# Patient Record
Sex: FEMALE | Race: WHITE | NOT HISPANIC OR LATINO | Employment: OTHER | URBAN - METROPOLITAN AREA
[De-identification: names, ages, dates, MRNs, and addresses within clinical notes are randomized per-mention and may not be internally consistent; named-entity substitution may affect disease eponyms.]

---

## 2018-03-15 ENCOUNTER — PROCEDURE VISIT CONVERTED (OUTPATIENT)
Dept: PODIATRY | Facility: CLINIC | Age: 83
End: 2018-03-15
Attending: PODIATRIST

## 2018-04-17 ENCOUNTER — CONVERSION ENCOUNTER (OUTPATIENT)
Dept: ORTHOPEDIC SURGERY | Facility: CLINIC | Age: 83
End: 2018-04-17

## 2018-04-17 ENCOUNTER — OFFICE VISIT CONVERTED (OUTPATIENT)
Dept: ORTHOPEDIC SURGERY | Facility: CLINIC | Age: 83
End: 2018-04-17
Attending: ORTHOPAEDIC SURGERY

## 2018-06-05 ENCOUNTER — OFFICE VISIT CONVERTED (OUTPATIENT)
Dept: ORTHOPEDIC SURGERY | Facility: CLINIC | Age: 83
End: 2018-06-05
Attending: PHYSICIAN ASSISTANT

## 2018-06-05 ENCOUNTER — CONVERSION ENCOUNTER (OUTPATIENT)
Dept: ORTHOPEDIC SURGERY | Facility: CLINIC | Age: 83
End: 2018-06-05

## 2018-06-08 ENCOUNTER — PROCEDURE VISIT CONVERTED (OUTPATIENT)
Dept: PODIATRY | Facility: CLINIC | Age: 83
End: 2018-06-08
Attending: PODIATRIST

## 2018-06-08 ENCOUNTER — CONVERSION ENCOUNTER (OUTPATIENT)
Dept: OTHER | Facility: HOSPITAL | Age: 83
End: 2018-06-08

## 2018-07-17 ENCOUNTER — OFFICE VISIT CONVERTED (OUTPATIENT)
Dept: ORTHOPEDIC SURGERY | Facility: CLINIC | Age: 83
End: 2018-07-17
Attending: PHYSICIAN ASSISTANT

## 2018-07-17 ENCOUNTER — CONVERSION ENCOUNTER (OUTPATIENT)
Dept: ORTHOPEDIC SURGERY | Facility: CLINIC | Age: 83
End: 2018-07-17

## 2018-08-31 ENCOUNTER — CONVERSION ENCOUNTER (OUTPATIENT)
Dept: PODIATRY | Facility: CLINIC | Age: 83
End: 2018-08-31

## 2018-08-31 ENCOUNTER — PROCEDURE VISIT CONVERTED (OUTPATIENT)
Dept: PODIATRY | Facility: CLINIC | Age: 83
End: 2018-08-31
Attending: PODIATRIST

## 2018-11-01 ENCOUNTER — OFFICE VISIT CONVERTED (OUTPATIENT)
Dept: ORTHOPEDIC SURGERY | Facility: CLINIC | Age: 83
End: 2018-11-01
Attending: ORTHOPAEDIC SURGERY

## 2018-12-03 ENCOUNTER — PROCEDURE VISIT CONVERTED (OUTPATIENT)
Dept: PODIATRY | Facility: CLINIC | Age: 83
End: 2018-12-03
Attending: PODIATRIST

## 2019-02-04 ENCOUNTER — PROCEDURE VISIT CONVERTED (OUTPATIENT)
Dept: PODIATRY | Facility: CLINIC | Age: 84
End: 2019-02-04
Attending: PODIATRIST

## 2019-04-22 ENCOUNTER — PROCEDURE VISIT CONVERTED (OUTPATIENT)
Dept: PODIATRY | Facility: CLINIC | Age: 84
End: 2019-04-22
Attending: PODIATRIST

## 2019-07-08 ENCOUNTER — PROCEDURE VISIT CONVERTED (OUTPATIENT)
Dept: PODIATRY | Facility: CLINIC | Age: 84
End: 2019-07-08
Attending: PODIATRIST

## 2019-09-30 ENCOUNTER — PROCEDURE VISIT CONVERTED (OUTPATIENT)
Dept: PODIATRY | Facility: CLINIC | Age: 84
End: 2019-09-30
Attending: PODIATRIST

## 2019-11-07 ENCOUNTER — OFFICE VISIT CONVERTED (OUTPATIENT)
Dept: ORTHOPEDIC SURGERY | Facility: CLINIC | Age: 84
End: 2019-11-07
Attending: ORTHOPAEDIC SURGERY

## 2019-12-16 ENCOUNTER — CONVERSION ENCOUNTER (OUTPATIENT)
Dept: PODIATRY | Facility: CLINIC | Age: 84
End: 2019-12-16

## 2019-12-16 ENCOUNTER — PROCEDURE VISIT CONVERTED (OUTPATIENT)
Dept: PODIATRY | Facility: CLINIC | Age: 84
End: 2019-12-16
Attending: PODIATRIST

## 2020-03-09 ENCOUNTER — PROCEDURE VISIT CONVERTED (OUTPATIENT)
Dept: PODIATRY | Facility: CLINIC | Age: 85
End: 2020-03-09
Attending: PODIATRIST

## 2020-06-08 ENCOUNTER — CONVERSION ENCOUNTER (OUTPATIENT)
Dept: PODIATRY | Facility: CLINIC | Age: 85
End: 2020-06-08

## 2020-06-08 ENCOUNTER — PROCEDURE VISIT CONVERTED (OUTPATIENT)
Dept: PODIATRY | Facility: CLINIC | Age: 85
End: 2020-06-08
Attending: PODIATRIST

## 2020-08-31 ENCOUNTER — CONVERSION ENCOUNTER (OUTPATIENT)
Dept: PODIATRY | Facility: CLINIC | Age: 85
End: 2020-08-31

## 2020-08-31 ENCOUNTER — PROCEDURE VISIT CONVERTED (OUTPATIENT)
Dept: PODIATRY | Facility: CLINIC | Age: 85
End: 2020-08-31
Attending: PODIATRIST

## 2020-11-23 ENCOUNTER — PROCEDURE VISIT CONVERTED (OUTPATIENT)
Dept: PODIATRY | Facility: CLINIC | Age: 85
End: 2020-11-23
Attending: PODIATRIST

## 2021-03-02 ENCOUNTER — PROCEDURE VISIT CONVERTED (OUTPATIENT)
Dept: PODIATRY | Facility: CLINIC | Age: 86
End: 2021-03-02
Attending: PODIATRIST

## 2021-03-29 ENCOUNTER — OFFICE VISIT CONVERTED (OUTPATIENT)
Dept: OTOLARYNGOLOGY | Facility: CLINIC | Age: 86
End: 2021-03-29
Attending: NURSE PRACTITIONER

## 2021-05-10 NOTE — H&P
History and Physical      Patient Name: Megha Hardy   Patient ID: [id number]   Sex: Female   YOB: 1930    Primary Care Provider: Vasquez Berry   Referring Provider: Vasquez Berry    Visit Date: March 29, 2021    Provider: MELVIN Chopra   Location: [facility]   Location Address: [address] [place] [address], [address] [place] [address]  [id number]   Location Phone: [phone]          Chief Complaint     1.  Hearing loss    2.  Cerumen impactions    3.  Postnasal drainage and chronic rhinitis       History Of Present Illness  Megha Hardy is a 89 year old /White female who presents to the office today as a consult from Vasquez Berry.      She presents to the clinic today accompanied by her son for evaluation of her ears.  She reports that she has had issues with cerumen impaction over the past several months.  She feels like her hearing has slowly been worsening over several years.  She reports that she has more trouble hearing out of her right ear and has always felt this way.  She states that she does have trouble with the TV and has to turn it up very loudly.  She states that sometimes she is able to hear on the telephone and other times she is not.  She denies any issues with recurrent infection, pain or otorrhea.  She does have some ringing in her ears bilaterally.  She has never had an audiogram.    She also states for many years she has had issues with postnasal drainage and chronic rhinitis.  She states that it is often clear that comes out of her nose but she feels like there is a thick phlegm that gets stuck in the back of her throat.  She will occasionally have some bleeding from her nose.  She is not using any nasal sprays or antihistamines.       Past Medical History  Ankle pain; Arthritis; Bladder disorder; Broken shoulder; Bronchitis; Bunion; Cancer, skin, squamous cell; Cerumen Impaction; Corns and callus; Foot cramps; Foot pain, left; Foot pain, right;  Hallux valgus of left foot; Hallux valgus of right foot; Hammer toe; Head injury; Hearing loss; Heart Disease; High cholesterol; History of total knee arthroplasty, left, 2018; Hyperlipidemia; Hypertension; Ingrowing toenail; Kidney Disease; Kidney problem; Limb Swelling; Nausea; Numbness in feet; Obesity; Primary osteoarthritis of left knee; Shortness of breath; Sinus congestion; Tinea unguium; Ulcer         Past Surgical History  Artificial Joints/Limbs; cardiac stents; Knee replacement, left; Knee replacement, right; shoulder repair; skin cancer removed         Medication List  amlodipine 5 mg oral tablet; atorvastatin 20 mg oral tablet; Calcium 600 + D(3) 600-125 mg-unit oral tablet; Diovan 40 mg oral tablet; metoprolol succinate 50 mg oral tablet extended release 24 hr; multivitamin oral tablet; pantoprazole 20 mg oral tablet,delayed release (DR/EC); PreserVision AREDS-2 952-311-27-1 mg-unit-mg-mg oral capsule; Vitamin D3 125 mcg (5,000 unit) oral tablet         Allergy List  SULFA (SULFONAMIDES)         Family Medical History  Family history of colon cancer; Family history of Arthritis; Family history of stroke; Family history of heart disease; Family history of diabetes mellitus         Reproductive History   0 Para 0 0 0 0       Social History  Alcohol (Never); lives alone; Recreational Drug Use (Never); Retired.; Tobacco (Never);          Immunizations  Name Date Admin   Influenza 10/09/2015         Review of Systems  · Constitutional  o Denies  o : fever, night sweats, weight loss  · Eyes  o Admits  o : impaired vision  o Denies  o : discharge from eye  · HENT  o Admits  o : *See HPI  · Cardiovascular  o Denies  o : chest pain, irregular heart beats  · Respiratory  o Admits  o : shortness of breath  o Denies  o : wheezing, coughing up blood  · Gastrointestinal  o Admits  o : reflux  o Denies  o : heartburn, vomiting blood  · Genitourinary  o Admits  o :  "frequency  · Integument  o Admits  o : rash, skin dryness  · Neurologic  o Admits  o : loss of balance, dizziness  o Denies  o : seizures, loss of consciousness  · Endocrine  o Denies  o : cold intolerance, heat intolerance  · Heme-Lymph  o Admits  o : easy bleeding  o Denies  o : anemia      Vitals  Date Time BP Position Site L\R Cuff Size HR RR TEMP (F) WT  HT  BMI kg/m2 BSA m2 O2 Sat FR L/min FiO2 HC       03/29/2021 01:32 PM        97.3 193lbs 8oz 5'  6\" 31.23 2.02             Physical Examination  · Constitutional  o Appearance  o : well developed, well-nourished, alert and in no acute distress, voice clear and strong  · Head and Face  o Head  o :   § Inspection  § : no deformities or lesions  o Face  o :   § Inspection  § : No facial lesions; House-Brackmann I/VI bilaterally  § Palpation  § : No TMJ crepitus nor  muscle tenderness bilaterally  · Eyes  o Vision  o :   § Visual Fields  § : Extraocular movements are intact. No spontaneous or gaze-induced nystagmus.  o Conjunctivae  o : clear  o Sclerae  o : clear  o Pupils and Irises  o : pupils equal, round, and reactive to light.   · Ears, Nose, Mouth and Throat  o Ears  o :   § External Ears  § : appearance within normal limits, no lesions present  § Otoscopic Examination  § : Bilateral cerumen impaction, cleared under the microscope using alligator forceps and suction, tympanic membrane appearance within normal limits bilaterally without perforations, well-aerated middle ears  § Hearing  § : intact to conversational voice both ears  o Nose  o :   § External Nose  § : appearance normal  § Intranasal Exam  § : mucosa within normal limits, vestibules normal, no intranasal lesions present, septum midline, sinuses non tender to percussion  o Oral Cavity  o :   § Oral Mucosa  § : oral mucosa normal without pallor or cyanosis  § Lips  § : lip appearance normal  § Teeth  § : normal dentition for age  § Gums  § : gums pink, non-swollen, no bleeding " present  § Tongue  § : tongue appearance normal; normal mobility  § Palate  § : hard palate normal, soft palate appearance normal with symmetric mobility  o Throat  o :   § Oropharynx  § : no inflammation or lesions present, tonsils within normal limits  · Neck  o Inspection/Palpation  o : normal appearance, no masses or tenderness, trachea midline; thyroid size normal, nontender, no nodules or masses present on palpation  · Respiratory  o Respiratory Effort  o : breathing unlabored  o Inspection of Chest  o : normal appearance, no retractions  · Lymphatic  o Neck  o : no lymphadenopathy present  o Supraclavicular Nodes  o : no lymphadenopathy present  o Preauricular Nodes  o : no lymphadenopathy present  · Skin and Subcutaneous Tissue  o General Inspection  o : Regarding face and neck - there are no rashes present, no lesions present, and no areas of discoloration  · Neurologic  o Cranial Nerves  o : cranial nerves II-XII are grossly intact bilaterally  o Gait and Station  o : normal gait, able to stand without diffculty  · Psychiatric  o Judgement and Insight  o : judgment and insight intact  o Mood and Affect  o : mood normal, affect appropriate          Assessment  · Impacted cerumen, bilateral     380.4/H61.23  · Chronic rhinitis     472.0/J31.0  · Sensorineural hearing loss (SNHL), bilateral     389.18/H90.3      Plan  · Orders  o Audiometry, pure-tone (threshold); [facility] (22345) - 389.18/H90.3 - 03/29/2021  o Tympanogram (Impedance Testing) [facility] (89097) - 389.18/H90.3 - 03/29/2021  · Medications  o azelastine 137 mcg (0.1 %) nasal aerosol,spray   SIG: apply 2 sprays by nasal route 2 times a day   DISP: (1) Bottle with 11 refills  Prescribed on 03/29/2021     o Medications have been Reconciled  o Transition of Care or Provider Policy  · Instructions  o She presents to the clinic today accompanied by her son for evaluation of her ears. She reports that she has had issues with cerumen impaction over the  past several months. She feels like her hearing has slowly been worsening over several years. She reports that she has more trouble hearing out of her right ear and has always felt this way. She states that she does have trouble with the TV and has to turn it up very loudly. She states that sometimes she is able to hear on the telephone and other times she is not. She denies any issues with recurrent infection, pain or otorrhea. She does have some ringing in her ears bilaterally. She has never had an audiogram.She also states for many years she has had issues with postnasal drainage and chronic rhinitis. She states that it is often clear that comes out of her nose but she feels like there is a thick phlegm that gets stuck in the back of her throat. She will occasionally have some bleeding from her nose. She is not using any nasal sprays or antihistamines. On examination today she has bilateral cerumen impactions which I am able to clear under the microscope using suction and alligator forceps. Bilateral tympanic membrane appearance is normal. Middle ears are well aerated. We did obtain an audiogram and tympanogram. The audiogram shows the left ear with a borderline normal hearing sloping to a severe sensorineural hearing loss. The right ear shows an essentially flat moderate to moderately severe mainly sensorineural hearing loss with a slight mixed component at 500 Hz. Speech reception threshold was at 60 dB on the right and 40 dB on the left. Word discrimination scores were 44% on the right at 95 dB and 84% on the left at 80 dB. Right tympanogram showed negative pressure in the left tympanogram showed slightly stiffened movement but no negative pressure. I have gone over the results of the audiogram with the patient and her son. I do feel like hearing aids would be beneficial to her but at this time she does not want to look into these. I will plan to see her back in 1 year to remove cerumen from her ears again.  For her postnasal drainage and chronic rhinitis I will start her on azelastine nasal spray to use daily.  o Electronically Identified Patient Education Materials Provided Electronically  · Correspondence  o ENT Letter to Referring MD (Vasquez Berry) - 03/29/2021            Electronically Signed by: MELVIN Chopra -Author on March 29, 2021 03:03:39 PM

## 2021-05-13 NOTE — PROGRESS NOTES
Progress Note      Patient Name: Megha Hardy   Patient ID: [id number]   Sex: Female   YOB: 1930    Primary Care Provider: Vasquez Berry   Referring Provider: Aidan Lundberg DPM    Visit Date: June 8, 2020    Provider: Aidan Lundberg DPM   Location: [facility]   Location Address: [address] [place] [address]  [id number]   Location Phone: [phone]          Chief Complaint  · Routine Foot Care Visit      History Of Present Illness  Megha Hardy complains of painful, elongated toenails which are thickened, yellowed, chalky, and cause pain with shoe gear and ambulation.      New, Established, New Problem:  Established   Location:  Toenails  Duration:   Greater than five years  Onset:  Gradual  Nature:  Sore with palpation.  Stable, worsening, improving:   stable  Aggravating factors:  Pain with shoe gear and ambulation.  Previous Treatment:  Debridement    Patient denies any fevers, chills, nausea, vomiting, shortness of breathe, nor any other constitutional signs nor symptoms.    Patient reports that no changes in their medications with their recent appointment with their primary care provider.       Past Medical History  Ankle pain; Arthritis; Bladder Disorder; Broken shoulder; Bronchitis; Bunion; Cancer, skin, squamous cell; Corns and callus; Foot cramps; Foot pain, left; Foot pain, right; Hallux valgus of left foot; Hallux valgus of right foot; Hammer toe; Head injury; Heart Disease; High blood pressure; High cholesterol; History of total knee arthroplasty, left, 05/07/2018; Hyperlipidemia; Hypertension; Ingrowing toenail; Kidney Disease; Kidney problem; Limb Swelling; Nausea; Numbness in feet; Obesity; Primary osteoarthritis of left knee; Shortness of breath; Shortness of breath; Sinus congestion; Tinea unguium; Ulcer         Past Surgical History  Artificial Joints/Limbs; cardiac stents; Knee replacement, left; Knee replacement, right;  "shoulder repair; skin cancer removed         Medication List  amlodipine 5 mg oral tablet; atorvastatin 20 mg oral tablet; Calcium 600 + D(3) 600-125 mg-unit oral tablet; metoprolol succinate 50 mg oral tablet extended release 24 hr; multivitamin oral tablet         Allergy List  SULFA (SULFONAMIDES)       Allergies Reconciled  Family Medical History  Stroke; Heart Disease; Cancer, Unspecified; Diabetes, unspecified type; Colon Cancer; Diabetes; Family history of Arthritis         Reproductive History   0 Para 0 0 0 0       Social History  Alcohol (Never); Alcohol Use (Never); lives alone; Recreational Drug Use (Never); Retired; Retired.; Tobacco (Never);          Immunizations  Name Date Admin   Influenza          Review of Systems  · Constitutional  o Denies  o : fever, chills  · Eyes  o Denies  o : double vision  · HENT  o Denies  o : vertigo, recent head injury, hearing loss or ringing  · Cardiovascular  o Denies  o : chest pain, irregular heart beats  · Respiratory  o Denies  o : shortness of breath, productive cough  · Gastrointestinal  o Denies  o : nausea, vomiting  · Integument  o * See HPI  · Neurologic  o Denies  o : altered mental status, tingling or numbness, seizures  · Musculoskeletal  o Denies  o : joint pain, joint swelling, limitation of motion      Vitals  Date Time BP Position Site L\R Cuff Size HR RR TEMP (F) WT  HT  BMI kg/m2 BSA m2 O2 Sat        2020 10:22 /64 Sitting    58 - R  97.6 190lbs 2oz 5'  7\" 29.78 2.02 95 %          Physical Examination  · Constitutional  o Appearance  o : overweight, well developed, no obvious deformities present, appears to be chronically ill   · Eyes  o Vision  o : Patient wearing glasses.   · Respiratory  o Respiratory Effort  o : No labored breathing. Good respiratory effort.   · Cardiovascular  o Peripheral Vascular System  o :   § Pedal Pulses  § : Pedal Pulses are 2+ and symmetrical  § Extremities  § : 1+ edema present, no cyanosis, " no distal hair loss, normal capillary refill  · Musculoskeletal  o Extremeties/Joint  o : Lower extremity muscle strength and range of motion is equal and symmetrical bilaterally. The knees are noted to be in normal alignment. Ankle alignment and range of motion is normal and foot structure is normal.  · Skin and Subcutaneous Tissue  o General Inspection  o : no lesions present, no areas of discoloration, skin turgor normal, texture normal  · Neurologic  o Sensation  o : Sharp/dull sensation is within normal limits. Heth-Maribell 5.07 monofilament intact to all assessed areas.   · Toes  o Toes: Right Foot  o :   § Inspection  § : Medial deviation of the first metatarsal with associated lateral deviation of the hallux at the metatarsal phalangeal joint.   § Toenails  § : Toenails are hypertrophic, mycotc, dystrophic, thickened, with sublingual debris, incurvated, brittle toenail(s) at nail-1, 2, 3,, Oncholysis of the foot   o Toes: Left Foot  o :   § Inspection  § : Medial deviation of the first metatarsal with associated lateral deviation of the hallux at the metatarsal phalangeal joint.   § Toenails  § : Toenails are hypertrophic, mycotc, dystrophic, thickened, with sublingual debris, incurvated, brittle toenail(s) at nail-1, 2, 3,, Onycholysis of the foot   · Procedures  o Nail Debridement  o : Nail debridement is indicated for the following toenails:-left hallux, left 2nd toe, left 3rd toe, right hallux, right 2nd toe, right 3rd toe,, The nail was debrided of excessive thickness to appropriate levels of comfort and contour using nail nippers. The procedure was without complications          Assessment  · Foot pain, left     729.5/M79.672  · Foot pain, right     729.5/M79.671  · Ingrowing nail     703.0/L60.0  · Tinea unguium     110.1/B35.1  · Hallux valgus of left foot     735.0/M20.12  · Hallux valgus of right foot     735.0/M20.11  · Ingrowing toenail     703.0/L60.0      Plan  · Orders  o Debridement of  six or more nails (17863, 01476, 61659, 90189, 28950, 29345, 13252) - 703.0/L60.0, 729.5/M79.671, 729.5/M79.672, 110.1/B35.1 - 06/08/2020  o ACO-16: BMI above normal range and follow-up plan is documented (, , , , , , ) - - 06/08/2020  · Medications  o Medications have been Reconciled  o Transition of Care or Provider Policy  · Instructions  o I have discussed the findings of this evaluation with the patient. The discussion included a complete verbal explanation of any changes in the examination results, diagnosis, and the current treatment plan. A schedule for future care needs was explained. If any questions should arise after returning home, I have encouraged the patient to feel free to contact Dr. Lundberg. The patient states understanding and agreement with this plan.   o Patient is to monitor for problems and to contact Dr. Lundberg for follow-up should such signs occur. Patient states understanding and agreement with this plan.   o BMI Counseling: Discussed weight loss and the need for exercise.   o Follow up in 9 weeks for Routine Foot Care.   o Encouraged to follow-up with Primary Care Provider for preventative care.   o Electronically Identified Patient Education Materials Provided Electronically  · Disposition  o Call or Return if symptoms worsen or persist.            Electronically Signed by: Aidan Lundberg DPM -Author on June 8, 2020 10:53:39 AM   no

## 2021-05-13 NOTE — PROGRESS NOTES
Progress Note      Patient Name: Megha Hardy   Patient ID: [id number]   Sex: Female   YOB: 1930    Primary Care Provider: Vasquez Berry   Referring Provider: Aidan Lundberg DPM    Visit Date: August 31, 2020    Provider: Aidan Lundberg DPM   Location: [facility]   Location Address: [address] [place] [address]  [id number]   Location Phone: [phone]          Chief Complaint  · Routine Foot Care Visit      History Of Present Illness  Megha Hardy complains of painful, elongated toenails which are thickened, yellowed, chalky, and cause pain with shoe gear and ambulation.      New, Established, New Problem:  Established   Location:  Toenails  Duration:   Greater than five years  Onset:  Gradual  Nature:  Sore with palpation.  Stable, worsening, improving:   stable  Aggravating factors:  Pain with shoe gear and ambulation.  Previous Treatment:  Debridement    Patient denies any fevers, chills, nausea, vomiting, shortness of breathe, nor any other constitutional signs nor symptoms.    Patient relates no medical changes since their last visit.       Past Medical History  Ankle pain; Arthritis; Bladder Disorder; Broken shoulder; Bronchitis; Bunion; Cancer, skin, squamous cell; Corns and callus; Foot cramps; Foot pain, left; Foot pain, right; Hallux valgus of left foot; Hallux valgus of right foot; Hammer toe; Head injury; Heart Disease; High blood pressure; High cholesterol; History of total knee arthroplasty, left, 05/07/2018; Hyperlipidemia; Hypertension; Ingrowing toenail; Kidney Disease; Kidney problem; Limb Swelling; Nausea; Numbness in feet; Obesity; Primary osteoarthritis of left knee; Shortness of breath; Shortness of breath; Sinus congestion; Tinea unguium; Ulcer         Past Surgical History  Artificial Joints/Limbs; cardiac stents; Knee replacement, left; Knee replacement, right; shoulder repair; skin cancer removed         Medication  "List  amlodipine 5 mg oral tablet; atorvastatin 20 mg oral tablet; Calcium 600 + D(3) 600-125 mg-unit oral tablet; metoprolol succinate 50 mg oral tablet extended release 24 hr; multivitamin oral tablet         Allergy List  SULFA (SULFONAMIDES)       Allergies Reconciled  Family Medical History  Stroke; Heart Disease; Cancer, Unspecified; Diabetes, unspecified type; Colon Cancer; Diabetes; Family history of Arthritis         Reproductive History   0 Para 0 0 0 0       Social History  Alcohol (Never); Alcohol Use (Never); lives alone; Recreational Drug Use (Never); Retired; Retired.; Tobacco (Never);          Immunizations  Name Date Admin   Influenza          Review of Systems  · Constitutional  o Denies  o : fever, chills  · Eyes  o Denies  o : double vision  · HENT  o Denies  o : vertigo, recent head injury, hearing loss or ringing  · Cardiovascular  o Denies  o : chest pain, irregular heart beats  · Respiratory  o Denies  o : shortness of breath, productive cough  · Gastrointestinal  o Denies  o : nausea, vomiting  · Integument  o * See HPI  · Neurologic  o Denies  o : altered mental status, tingling or numbness, seizures  · Musculoskeletal  o Denies  o : joint pain, joint swelling, limitation of motion      Vitals  Date Time BP Position Site L\R Cuff Size HR RR TEMP (F) WT  HT  BMI kg/m2 BSA m2 O2 Sat        2020 10:29 /63 Sitting    62 - R  97.6 187lbs 0oz 5'  7\" 29.29 2 98 %          Physical Examination  · Constitutional  o Appearance  o : overweight, well developed, no obvious deformities present, appears to be chronically ill   · Eyes  o Vision  o : Patient wearing glasses.   · Respiratory  o Respiratory Effort  o : No labored breathing. Good respiratory effort.   · Cardiovascular  o Peripheral Vascular System  o :   § Pedal Pulses  § : Pedal Pulses are 2+ and symmetrical  § Extremities  § : 1+ edema present, no cyanosis, no distal hair loss, normal capillary " refill  · Musculoskeletal  o Extremeties/Joint  o : Lower extremity muscle strength and range of motion is equal and symmetrical bilaterally. The knees are noted to be in normal alignment. Ankle alignment and range of motion is normal and foot structure is normal.  · Skin and Subcutaneous Tissue  o General Inspection  o : no lesions present, no areas of discoloration, skin turgor normal, texture normal  · Neurologic  o Sensation  o : Sharp/dull sensation is within normal limits. [place]ell 5.07 monofilament intact to all assessed areas.   · Toes  o Toes: Right Foot  o :   § Inspection  § : Medial deviation of the first metatarsal with associated lateral deviation of the hallux at the metatarsal phalangeal joint.   § Toenails  § : Toenails are hypertrophic, mycotc, dystrophic, thickened, with sublingual debris, incurvated, brittle toenail(s) at nail-1, 2, 3,, Oncholysis of the foot   o Toes: Left Foot  o :   § Inspection  § : Medial deviation of the first metatarsal with associated lateral deviation of the hallux at the metatarsal phalangeal joint.   § Toenails  § : Toenails are hypertrophic, mycotc, dystrophic, thickened, with sublingual debris, incurvated, brittle toenail(s) at nail-1, 2, 3,, Onycholysis of the foot   · Procedures  o Nail Debridement  o : Nail debridement is indicated for the following toenails:-left hallux, left 2nd toe, left 3rd toe, right hallux, right 2nd toe, right 3rd toe,, The nail was debrided of excessive thickness to appropriate levels of comfort and contour using nail nippers. The procedure was without complications          Assessment  · Foot pain, left     729.5/M79.672  · Foot pain, right     729.5/M79.671  · Ingrowing nail     703.0/L60.0  · Tinea unguium     110.1/B35.1  · Hallux valgus of left foot     735.0/M20.12  · Hallux valgus of right foot     735.0/M20.11  · Ingrowing toenail     703.0/L60.0      Plan  · Orders  o Debridement of six or more nails (62095, 75290, 98596,  08853, 19776, 49660, 39968, 11794) - 703.0/L60.0, 729.5/M79.671, 729.5/M79.672, 110.1/B35.1 - 08/31/2020  o ACO-16: BMI above normal range and follow-up plan is documented (, , , , , , , ) - - 08/31/2020  · Medications  o Medications have been Reconciled  o Transition of Care or Provider Policy  · Instructions  o I have discussed the findings of this evaluation with the patient. The discussion included a complete verbal explanation of any changes in the examination results, diagnosis, and the current treatment plan. A schedule for future care needs was explained. If any questions should arise after returning home, I have encouraged the patient to feel free to contact Dr. Lundberg. The patient states understanding and agreement with this plan.   o Patient is to monitor for problems and to contact Dr. Lundberg for follow-up should such signs occur. Patient states understanding and agreement with this plan.   o BMI Counseling: Discussed weight loss and the need for exercise.   o Follow up in 9 weeks for Routine Foot Care.   o Encouraged to follow-up with Primary Care Provider for preventative care.   o Electronically Identified Patient Education Materials Provided Electronically  · Disposition  o Call or Return if symptoms worsen or persist.            Electronically Signed by: Aidan Lundberg DPM -Author on August 31, 2020 10:46:09 AM

## 2021-05-13 NOTE — PROGRESS NOTES
Progress Note      Patient Name: Megha Hardy   Patient ID: [id number]   Sex: Female   YOB: 1930    Primary Care Provider: Vasquez Berry   Referring Provider: Aidan Lundberg DPM    Visit Date: November 23, 2020    Provider: Aidan Lundberg DPM   Location: [facility]   Location Address: [address] [place] [address]  [id number]   Location Phone: [phone]          Chief Complaint  · Routine Foot Care Visit      History Of Present Illness  Megha Hardy complains of painful, elongated toenails which are thickened, yellowed, chalky, and cause pain with shoe gear and ambulation.      New, Established, New Problem:  Established   Location:  Toenails  Duration:   Greater than five years  Onset:  Gradual  Nature:  Sore with palpation.  Stable, worsening, improving:   stable  Aggravating factors:  Pain with shoe gear and ambulation.  Previous Treatment:  Debridement    Patient denies any fevers, chills, nausea, vomiting, shortness of breathe, nor any other constitutional signs nor symptoms.    Patient reports that no changes in their medications with their recent appointment with their primary care provider.       Past Medical History  Ankle pain; Arthritis; Bladder Disorder; Broken shoulder; Bronchitis; Bunion; Cancer, skin, squamous cell; Corns and callus; Foot cramps; Foot pain, left; Foot pain, right; Hallux valgus of left foot; Hallux valgus of right foot; Hammer toe; Head injury; Heart Disease; High blood pressure; High cholesterol; History of total knee arthroplasty, left, 05/07/2018; Hyperlipidemia; Hypertension; Ingrowing toenail; Kidney Disease; Kidney problem; Limb Swelling; Nausea; Numbness in feet; Obesity; Primary osteoarthritis of left knee; Shortness of breath; Shortness of breath; Sinus congestion; Tinea unguium; Ulcer         Past Surgical History  Artificial Joints/Limbs; cardiac stents; Knee replacement, left; Knee replacement, right; shoulder repair;  "skin cancer removed         Medication List  amlodipine 5 mg oral tablet; atorvastatin 20 mg oral tablet; Calcium 600 + D(3) 600-125 mg-unit oral tablet; metoprolol succinate 50 mg oral tablet extended release 24 hr; multivitamin oral tablet         Allergy List  SULFA (SULFONAMIDES)       Allergies Reconciled  Family Medical History  Stroke; Heart Disease; Cancer, Unspecified; Diabetes, unspecified type; Colon Cancer; Diabetes; Family history of Arthritis         Reproductive History   0 Para 0 0 0 0       Social History  Alcohol (Never); Alcohol Use (Never); lives alone; Recreational Drug Use (Never); Retired; Retired.; Tobacco (Never);          Immunizations  Name Date Admin   Influenza 10/09/2015         Review of Systems  · Constitutional  o Denies  o : fever, chills  · Eyes  o Denies  o : double vision  · HENT  o Denies  o : vertigo, recent head injury, hearing loss or ringing  · Cardiovascular  o Denies  o : chest pain, irregular heart beats  · Respiratory  o Denies  o : shortness of breath, productive cough  · Gastrointestinal  o Denies  o : nausea, vomiting  · Integument  o * See HPI  · Neurologic  o Denies  o : altered mental status, tingling or numbness, seizures  · Musculoskeletal  o Denies  o : joint pain, joint swelling, limitation of motion      Vitals  Date Time BP Position Site L\R Cuff Size HR RR TEMP (F) WT  HT  BMI kg/m2 BSA m2 O2 Sat FR L/min FiO2 HC       2020 10:43 /80 Sitting    57 - R  97.8 188lbs 0oz 5'  7\" 29.44 2.01 98 %      2020 10:43 /73 Sitting                       Physical Examination  · Constitutional  o Appearance  o : overweight, well developed, no obvious deformities present, appears to be chronically ill   · Eyes  o Vision  o : Patient wearing glasses.   · Respiratory  o Respiratory Effort  o : No labored breathing. Good respiratory effort.   · Cardiovascular  o Peripheral Vascular System  o :   § Pedal Pulses  § : Pedal Pulses are 2+ and " symmetrical  § Extremities  § : 1+ edema present, no cyanosis, no distal hair loss, normal capillary refill  · Musculoskeletal  o Extremeties/Joint  o : Lower extremity muscle strength and range of motion is equal and symmetrical bilaterally. The knees are noted to be in normal alignment. Ankle alignment and range of motion is normal and foot structure is normal.  · Skin and Subcutaneous Tissue  o General Inspection  o : no lesions present, no areas of discoloration, skin turgor normal, texture normal  · Neurologic  o Sensation  o : Sharp/dull sensation is within normal limits. [place]-Maribell 5.07 monofilament intact to all assessed areas.   · Toes  o Toes: Right Foot  o :   § Inspection  § : Medial deviation of the first metatarsal with associated lateral deviation of the hallux at the metatarsal phalangeal joint.   § Toenails  § : Toenails are hypertrophic, mycotc, dystrophic, thickened, with sublingual debris, incurvated, brittle toenail(s) at nail-1, 2, 3,, Oncholysis of the foot   o Toes: Left Foot  o :   § Inspection  § : Medial deviation of the first metatarsal with associated lateral deviation of the hallux at the metatarsal phalangeal joint.   § Toenails  § : Toenails are hypertrophic, mycotc, dystrophic, thickened, with sublingual debris, incurvated, brittle toenail(s) at nail-1, 2, 3,, Onycholysis of the foot   · Procedures  o Nail Debridement  o : Nail debridement is indicated for the following toenails:-left hallux, left 2nd toe, left 3rd toe, right hallux, right 2nd toe, right 3rd toe,, The nail was debrided of excessive thickness to appropriate levels of comfort and contour using nail nippers. The procedure was without complications          Assessment  · Foot pain, left     729.5/M79.672  · Foot pain, right     729.5/M79.671  · Ingrowing nail     703.0/L60.0  · Tinea unguium     110.1/B35.1  · Hallux valgus of left foot     735.0/M20.12  · Hallux valgus of right foot     735.0/M20.11  · Ingrowing  toenail     703.0/L60.0      Plan  · Orders  o Debridement of six or more nails (26636, 89113, 13898, 59562, 92339, 39419, 74225, 08877, 34862) - 703.0/L60.0, 729.5/M79.671, 729.5/M79.672, 110.1/B35.1 - 11/23/2020  o ACO-16: BMI above normal range and follow-up plan is documented (, , , , , , , , ) - - 11/23/2020  · Medications  o Medications have been Reconciled  o Transition of Care or Provider Policy  · Instructions  o I have discussed the findings of this evaluation with the patient. The discussion included a complete verbal explanation of any changes in the examination results, diagnosis, and the current treatment plan. A schedule for future care needs was explained. If any questions should arise after returning home, I have encouraged the patient to feel free to contact Dr. Lundberg. The patient states understanding and agreement with this plan.   o Patient is to monitor for problems and to contact Dr. Lundberg for follow-up should such signs occur. Patient states understanding and agreement with this plan.   o BMI Counseling: Discussed weight loss and the need for exercise.   o Follow up in 9 weeks for Routine Foot Care.   o Encouraged to follow-up with Primary Care Provider for preventative care.   o Electronically Identified Patient Education Materials Provided Electronically  · Disposition  o Call or Return if symptoms worsen or persist.            Electronically Signed by: Aidan Lundberg DPM -Author on November 23, 2020 11:07:10 AM

## 2021-05-14 VITALS
DIASTOLIC BLOOD PRESSURE: 79 MMHG | WEIGHT: 194 LBS | TEMPERATURE: 97.6 F | BODY MASS INDEX: 30.45 KG/M2 | HEIGHT: 67 IN | SYSTOLIC BLOOD PRESSURE: 165 MMHG | OXYGEN SATURATION: 98 % | HEART RATE: 68 BPM

## 2021-05-14 VITALS
TEMPERATURE: 97.6 F | DIASTOLIC BLOOD PRESSURE: 63 MMHG | WEIGHT: 187 LBS | HEIGHT: 67 IN | HEART RATE: 62 BPM | SYSTOLIC BLOOD PRESSURE: 138 MMHG | OXYGEN SATURATION: 98 % | BODY MASS INDEX: 29.35 KG/M2

## 2021-05-14 VITALS — BODY MASS INDEX: 31.1 KG/M2 | WEIGHT: 193.5 LBS | TEMPERATURE: 97.3 F | HEIGHT: 66 IN

## 2021-05-14 VITALS
OXYGEN SATURATION: 98 % | TEMPERATURE: 97.8 F | HEART RATE: 57 BPM | BODY MASS INDEX: 29.51 KG/M2 | HEIGHT: 67 IN | SYSTOLIC BLOOD PRESSURE: 161 MMHG | WEIGHT: 188 LBS | DIASTOLIC BLOOD PRESSURE: 80 MMHG

## 2021-05-15 VITALS
DIASTOLIC BLOOD PRESSURE: 62 MMHG | SYSTOLIC BLOOD PRESSURE: 143 MMHG | BODY MASS INDEX: 28.61 KG/M2 | WEIGHT: 178 LBS | OXYGEN SATURATION: 99 % | HEIGHT: 66 IN | HEART RATE: 57 BPM

## 2021-05-15 VITALS
OXYGEN SATURATION: 97 % | DIASTOLIC BLOOD PRESSURE: 55 MMHG | BODY MASS INDEX: 29.98 KG/M2 | WEIGHT: 191 LBS | HEART RATE: 57 BPM | HEIGHT: 67 IN | SYSTOLIC BLOOD PRESSURE: 163 MMHG

## 2021-05-15 VITALS
DIASTOLIC BLOOD PRESSURE: 64 MMHG | HEART RATE: 57 BPM | HEIGHT: 66 IN | BODY MASS INDEX: 28.77 KG/M2 | WEIGHT: 179 LBS | SYSTOLIC BLOOD PRESSURE: 148 MMHG | OXYGEN SATURATION: 99 %

## 2021-05-15 VITALS
HEART RATE: 61 BPM | OXYGEN SATURATION: 98 % | BODY MASS INDEX: 29.09 KG/M2 | WEIGHT: 181 LBS | DIASTOLIC BLOOD PRESSURE: 60 MMHG | HEIGHT: 66 IN | SYSTOLIC BLOOD PRESSURE: 164 MMHG

## 2021-05-15 VITALS
HEIGHT: 67 IN | SYSTOLIC BLOOD PRESSURE: 151 MMHG | HEART RATE: 54 BPM | BODY MASS INDEX: 27.62 KG/M2 | DIASTOLIC BLOOD PRESSURE: 55 MMHG | WEIGHT: 176 LBS | OXYGEN SATURATION: 99 %

## 2021-05-15 VITALS — WEIGHT: 175 LBS | HEART RATE: 61 BPM | OXYGEN SATURATION: 98 % | HEIGHT: 66 IN | BODY MASS INDEX: 28.12 KG/M2

## 2021-05-15 VITALS
DIASTOLIC BLOOD PRESSURE: 64 MMHG | BODY MASS INDEX: 29.84 KG/M2 | HEART RATE: 58 BPM | SYSTOLIC BLOOD PRESSURE: 142 MMHG | TEMPERATURE: 97.6 F | WEIGHT: 190.12 LBS | HEIGHT: 67 IN | OXYGEN SATURATION: 95 %

## 2021-05-16 VITALS
SYSTOLIC BLOOD PRESSURE: 183 MMHG | WEIGHT: 182 LBS | BODY MASS INDEX: 29.25 KG/M2 | HEART RATE: 54 BPM | DIASTOLIC BLOOD PRESSURE: 59 MMHG | HEIGHT: 66 IN | OXYGEN SATURATION: 99 %

## 2021-05-16 VITALS
HEIGHT: 66 IN | OXYGEN SATURATION: 99 % | TEMPERATURE: 97.9 F | DIASTOLIC BLOOD PRESSURE: 64 MMHG | SYSTOLIC BLOOD PRESSURE: 144 MMHG | BODY MASS INDEX: 29.45 KG/M2 | RESPIRATION RATE: 18 BRPM | WEIGHT: 183.25 LBS | HEART RATE: 64 BPM

## 2021-05-16 VITALS — BODY MASS INDEX: 30.09 KG/M2 | HEIGHT: 66 IN | WEIGHT: 187.25 LBS | OXYGEN SATURATION: 95 % | HEART RATE: 64 BPM

## 2021-05-16 VITALS — HEART RATE: 67 BPM | BODY MASS INDEX: 29.73 KG/M2 | HEIGHT: 66 IN | OXYGEN SATURATION: 98 % | WEIGHT: 185 LBS

## 2021-05-16 VITALS — OXYGEN SATURATION: 95 % | BODY MASS INDEX: 29.09 KG/M2 | WEIGHT: 181 LBS | HEART RATE: 69 BPM | HEIGHT: 66 IN

## 2021-05-16 VITALS — BODY MASS INDEX: 29.89 KG/M2 | WEIGHT: 186 LBS | HEIGHT: 66 IN | HEART RATE: 87 BPM | OXYGEN SATURATION: 97 %

## 2021-05-16 VITALS — HEART RATE: 75 BPM | HEIGHT: 66 IN | OXYGEN SATURATION: 96 % | BODY MASS INDEX: 29.95 KG/M2 | WEIGHT: 186.37 LBS

## 2021-05-16 VITALS — HEIGHT: 66 IN | HEART RATE: 80 BPM | BODY MASS INDEX: 29.25 KG/M2 | OXYGEN SATURATION: 95 % | WEIGHT: 182 LBS

## 2021-05-16 VITALS
BODY MASS INDEX: 28.77 KG/M2 | HEIGHT: 66 IN | DIASTOLIC BLOOD PRESSURE: 70 MMHG | SYSTOLIC BLOOD PRESSURE: 136 MMHG | HEART RATE: 62 BPM | WEIGHT: 179 LBS | OXYGEN SATURATION: 96 %

## 2021-05-25 ENCOUNTER — PROCEDURE VISIT CONVERTED (OUTPATIENT)
Dept: PODIATRY | Facility: CLINIC | Age: 86
End: 2021-05-25
Attending: PODIATRIST

## 2021-06-05 NOTE — PROGRESS NOTES
Progress Note      Patient Name: Megha Hardy   Patient ID: [id number]   Sex: Female   YOB: 1930    Primary Care Provider: Vasquez Berry   Referring Provider: Aidan Lundberg DPM    Visit Date: May 25, 2021    Provider: Aidan Lundberg DPM   Location: [facility]   Location Address: [address] [place] [address]  [id number]   Location Phone: [phone]          Chief Complaint  · Routine Foot Care Visit      History Of Present Illness  Megha Hardy complains of painful, elongated toenails which are thickened, yellowed, chalky, and cause pain with shoe gear and ambulation.      New, Established, New Problem:  Established   Location:  Toenails  Duration:   Greater than five years  Onset:  Gradual  Nature:  Sore with palpation.  Stable, worsening, improving:   worsening  Aggravating factors:  Pain with shoe gear and ambulation.  Previous Treatment:  Debridement    Patient denies any fevers, chills, nausea, vomiting, shortness of breathe, nor any other constitutional signs nor symptoms.    Patient reports the following medical changes since their last visit:    - took COVID-19 vaccination       Past Medical History  Ankle pain; Arthritis; Bladder disorder; Broken shoulder; Bronchitis; Bunion; Cancer, skin, squamous cell; Cerumen impaction; Corns and callus; Foot cramps; Foot pain, left; Foot pain, right; Hallux valgus of left foot; Hallux valgus of right foot; Hammer toe; Head injury; Hearing loss; Heart Disease; High cholesterol; History of total knee arthroplasty, left, 05/07/2018; Hyperlipidemia; Hypertension; Ingrowing toenail; Kidney Disease; Kidney problem; Limb Swelling; Nausea; Numbness in feet; Obesity; Primary osteoarthritis of left knee; Shortness of breath; Sinus congestion; Tinea unguium; Ulcer         Past Surgical History  Artificial Joints/Limbs; cardiac stents; Knee replacement, left; Knee replacement, right; shoulder repair; skin cancer removed  "        Medication List  amlodipine 5 mg oral tablet; atorvastatin 20 mg oral tablet; azelastine 137 mcg (0.1 %) nasal aerosol,spray; Calcium 600 + D(3) 600-125 mg-unit oral tablet; Diovan 40 mg oral tablet; metoprolol succinate 50 mg oral tablet extended release 24 hr; multivitamin oral tablet; pantoprazole 20 mg oral tablet,delayed release (DR/EC); PreserVision AREDS-2 613-566-36-1 mg-unit-mg-mg oral capsule; Vitamin D3 125 mcg (5,000 unit) oral tablet         Allergy List  SULFA (SULFONAMIDES)       Allergies Reconciled  Family Medical History  Family history of colon cancer; Family history of Arthritis; Family history of stroke; Family history of heart disease; Family history of diabetes mellitus         Reproductive History   0 Para 0 0 0 0       Social History  Alcohol (Never); lives alone; Recreational Drug Use (Never); Retired.; Tobacco (Never);          Immunizations  Name Date Admin   Influenza 10/09/2015         Review of Systems  · Constitutional  o Denies  o : fever, chills  · Eyes  o Denies  o : double vision  · HENT  o Denies  o : vertigo, recent head injury, hearing loss or ringing  · Cardiovascular  o Denies  o : chest pain, irregular heart beats  · Respiratory  o Denies  o : shortness of breath, productive cough  · Gastrointestinal  o Denies  o : nausea, vomiting  · Integument  o * See HPI  · Neurologic  o Denies  o : altered mental status, tingling or numbness, seizures  · Musculoskeletal  o Denies  o : joint pain, joint swelling, limitation of motion      Vitals  Date Time BP Position Site L\R Cuff Size HR RR TEMP (F) WT  HT  BMI kg/m2 BSA m2 O2 Sat FR L/min FiO2 HC       2021 10:55 /50 Sitting    66 - R  97.6 192lbs 0oz 5'  7\" 30.07 2.03 98 %            Physical Examination  · Constitutional  o Appearance  o : overweight, well developed, no obvious deformities present, appears to be chronically ill   · Eyes  o Vision  o : Patient wearing glasses. "   · Respiratory  o Respiratory Effort  o : No labored breathing. Good respiratory effort.   · Cardiovascular  o Peripheral Vascular System  o :   § Pedal Pulses  § : Pedal Pulses are 2+ and symmetrical  § Extremities  § : 1+ edema present, no cyanosis, no distal hair loss, normal capillary refill  · Musculoskeletal  o Extremeties/Joint  o : Lower extremity muscle strength and range of motion is equal and symmetrical bilaterally. The knees are noted to be in normal alignment. Ankle alignment and range of motion is normal and foot structure is normal.  · Skin and Subcutaneous Tissue  o General Inspection  o : no lesions present, no areas of discoloration, skin turgor normal, texture normal  · Neurologic  o Sensation  o : Sharp/dull sensation is within normal limits. [place]-Maribell 5.07 monofilament intact to all assessed areas.   · Toes  o Toes: Right Foot  o :   § Inspection  § : Medial deviation of the first metatarsal with associated lateral deviation of the hallux at the metatarsal phalangeal joint.   § Toenails  § : Toenails are hypertrophic, mycotc, dystrophic, thickened, with sublingual debris, incurvated, brittle toenail(s) at nail-1, 2, 3,, Oncholysis of the foot   o Toes: Left Foot  o :   § Inspection  § : Medial deviation of the first metatarsal with associated lateral deviation of the hallux at the metatarsal phalangeal joint.   § Toenails  § : Toenails are hypertrophic, mycotc, dystrophic, thickened, with sublingual debris, incurvated, brittle toenail(s) at nail-1, 2, 3,, Onycholysis of the foot   · Procedures  o Nail Debridement  o : Nail debridement is indicated for the following toenails:-left hallux, left 2nd toe, left 3rd toe, right hallux, right 2nd toe, right 3rd toe,, The nail was debrided of excessive thickness to appropriate levels of comfort and contour using nail nippers. The procedure was without complications          Assessment  · Foot pain, left     729.5/M79.672  · Foot pain,  right     729.5/M79.671  · Ingrowing nail     703.0/L60.0  · Tinea unguium     110.1/B35.1  · Hallux valgus of left foot     735.0/M20.12  · Hallux valgus of right foot     735.0/M20.11  · Ingrowing toenail     703.0/L60.0      Plan  · Orders  o Debridement of six or more nails (02496, 85082, 42889, 16526, 04402, 76347, 35722, 87706, 49737, 10874, 28990) - 703.0/L60.0, 729.5/M79.671, 729.5/M79.672, 110.1/B35.1 - 05/25/2021  o ACO-16: BMI above normal range and follow-up plan is documented (, , , , , , , , , , ) - - 05/25/2021  · Medications  o Medications have been Reconciled  o Transition of Care or Provider Policy  · Instructions  o I have discussed the findings of this evaluation with the patient. The discussion included a complete verbal explanation of any changes in the examination results, diagnosis, and the current treatment plan. A schedule for future care needs was explained. If any questions should arise after returning home, I have encouraged the patient to feel free to contact Dr. Lundberg. The patient states understanding and agreement with this plan.   o Patient is to monitor for problems and to contact Dr. Lundberg for follow-up should such signs occur. Patient states understanding and agreement with this plan.   o BMI Counseling: Discussed weight loss and the need for exercise.   o Follow up in 9 weeks for Routine Foot Care.   o Encouraged to follow-up with Primary Care Provider for preventative care.   o Electronically Identified Patient Education Materials Provided Electronically  · Disposition  o Call or Return if symptoms worsen or persist.            Electronically Signed by: Aidan Lundberg DPM -Author on May 25, 2021 10:59:57 AM

## 2021-07-15 VITALS
HEART RATE: 66 BPM | HEIGHT: 67 IN | OXYGEN SATURATION: 98 % | WEIGHT: 192 LBS | DIASTOLIC BLOOD PRESSURE: 50 MMHG | SYSTOLIC BLOOD PRESSURE: 140 MMHG | TEMPERATURE: 97.6 F | BODY MASS INDEX: 30.13 KG/M2

## 2021-07-22 ENCOUNTER — OFFICE VISIT (OUTPATIENT)
Dept: ORTHOPEDIC SURGERY | Facility: CLINIC | Age: 86
End: 2021-07-22

## 2021-07-22 VITALS — HEIGHT: 67 IN | WEIGHT: 192 LBS | OXYGEN SATURATION: 96 % | BODY MASS INDEX: 30.13 KG/M2 | HEART RATE: 68 BPM

## 2021-07-22 DIAGNOSIS — M25.532 LEFT WRIST PAIN: ICD-10-CM

## 2021-07-22 DIAGNOSIS — M65.4 DE QUERVAIN'S TENOSYNOVITIS, LEFT: Primary | ICD-10-CM

## 2021-07-22 PROCEDURE — 99213 OFFICE O/P EST LOW 20 MIN: CPT | Performed by: ORTHOPAEDIC SURGERY

## 2021-07-22 PROCEDURE — 20600 DRAIN/INJ JOINT/BURSA W/O US: CPT | Performed by: ORTHOPAEDIC SURGERY

## 2021-07-22 RX ORDER — FUROSEMIDE 20 MG/1
TABLET ORAL 2 TIMES DAILY
COMMUNITY
Start: 2021-06-25 | End: 2023-01-23 | Stop reason: DRUGHIGH

## 2021-07-22 RX ORDER — METOPROLOL SUCCINATE 25 MG/1
TABLET, EXTENDED RELEASE ORAL
COMMUNITY
Start: 2021-06-23

## 2021-07-22 RX ORDER — ATORVASTATIN CALCIUM 40 MG/1
TABLET, FILM COATED ORAL
COMMUNITY
Start: 2021-06-08

## 2021-07-22 RX ORDER — GABAPENTIN 300 MG/1
CAPSULE ORAL
COMMUNITY

## 2021-07-22 RX ORDER — PANTOPRAZOLE SODIUM 20 MG/1
TABLET, DELAYED RELEASE ORAL
COMMUNITY
Start: 2021-06-23

## 2021-07-22 RX ORDER — AMLODIPINE BESYLATE 2.5 MG/1
TABLET ORAL
COMMUNITY
Start: 2021-06-25

## 2021-07-22 RX ORDER — ASPIRIN 81 MG/1
TABLET, CHEWABLE ORAL
COMMUNITY

## 2021-07-22 RX ORDER — VALSARTAN 40 MG/1
TABLET ORAL
COMMUNITY

## 2021-07-22 RX ORDER — AZELASTINE 1 MG/ML
SPRAY, METERED NASAL
COMMUNITY
Start: 2021-06-23

## 2021-07-22 RX ADMIN — METHYLPREDNISOLONE ACETATE 80 MG: 80 INJECTION, SUSPENSION INTRA-ARTICULAR; INTRALESIONAL; INTRAMUSCULAR; SOFT TISSUE at 10:49

## 2021-07-22 RX ADMIN — LIDOCAINE HYDROCHLORIDE 1 ML: 10 INJECTION, SOLUTION INFILTRATION; PERINEURAL at 10:49

## 2021-07-22 NOTE — PROGRESS NOTES
"Chief Complaint  Initial Evaluation of the Left Wrist and Initial Evaluation of the Left Hand     Subjective      Megha Hardy presents to [facility] for an evaluation of left wrist and hand. Patient states pain at the base of her thumb that radiates up to her wrist. She states anytime she twists or turns her wrist, it feels like a nerve is being pinched. She denies any injury or numbness/tingling to her wrist/hand.     Allergies   Allergen Reactions   • Sulfa Antibiotics Rash        Social History     Socioeconomic History   • Marital status:      Spouse name: Not on file   • Number of children: Not on file   • Years of education: Not on file   • Highest education level: Not on file   Tobacco Use   • Smoking status: Never Smoker   Vaping Use   • Vaping Use: Never used   Substance and Sexual Activity   • Alcohol use: Never   • Drug use: Never        Review of Systems     Objective   Vital Signs:   Pulse 68   Ht 170.2 cm (67\")   Wt 87.1 kg (192 lb)   SpO2 96%   BMI 30.07 kg/m²       Physical Exam  Constitutional:       Appearance: Normal appearance. He is well-developed and normal weight.   HENT:      Head: Normocephalic.      Right Ear: Hearing and external ear normal.      Left Ear: Hearing and external ear normal.      Nose: Nose normal.   Eyes:      Conjunctiva/sclera: Conjunctivae normal.   Cardiovascular:      Rate and Rhythm: Normal rate.   Pulmonary:      Effort: Pulmonary effort is normal.      Breath sounds: No wheezing or rales.   Abdominal:      Palpations: Abdomen is soft.      Tenderness: There is no abdominal tenderness.   Musculoskeletal:      Cervical back: Normal range of motion.   Skin:     Findings: No rash.   Neurological:      Mental Status: He is alert and oriented to person, place, and time.   Psychiatric:         Mood and Affect: Mood and affect normal.         Judgment: Judgment normal.       Ortho Exam      LEFT WRIST/HAND: Positive Fineklstein's. " No swelling, skin discoloration or atrophy. Full wrist flexion and extension with mild pain. Skin intact. Radial pulse 2+, ulnar pulse 2+. Full elbow range of motion. Patient able to form a fist and wiggle fingers. Negative Phalen's. Negative Tinel's. Tenderness at the base of the thumb.     Small Joint Arthrocentesis  Consent given by: patient  Site marked: site marked  Timeout: Immediately prior to procedure a time out was called to verify the correct patient, procedure, equipment, support staff and site/side marked as required   Procedure Details  Preparation: Patient was prepped and draped in the usual sterile fashion  Needle size: 25 G  Medications administered: 80 mg methylPREDNISolone acetate 80 MG/ML; 1 mL lidocaine 1 %  Patient tolerance: patient tolerated the procedure well with no immediate complications            Imaging Results (Most Recent)     Procedure Component Value Units Date/Time    XR Wrist 2 View Left [[id number]] Resulted: 07/22/21 1609     Updated: 07/22/21 1610    Narrative:      X-Ray Report:  Left wrist(s) X-Ray  Indication: Evaluation of left wrist   AP and Lateral view(s)  Findings: Demonstrates significant degenerative changes. No fracture or   dislocation.   Prior studies available for comparison: no            Result Review :       X-Ray Report:  Left wrist(s) X-Ray  Indication: Evaluation of left wrist   AP and Lateral view(s)  Findings: Demonstrates significant degenerative changes. No fracture or dislocation.   Prior studies available for comparison: no            Assessment and Plan     DX: de quervain's tenosynovitis    Discussed treatment plans and diagnosis with the patient. She was given a left wrist injection and tolerated this procedure well. She was provided with at home exercises. She was provided with a brace in office today.     Call or return if worsening symptoms.    Follow Up     PRN.       Patient was given instructions and counseling regarding her condition or for  health maintenance advice. Please see specific information pulled into the AVS if appropriate.     Scribed for Harley Keys MD by Nadege Weber.  07/22/21   10:15 EDT    I have personally performed the services described in this document as scribed by the above individual and it is both accurate and complete.  Harley Keys MD 07/22/21  10:15 EDT

## 2021-07-23 RX ORDER — LIDOCAINE HYDROCHLORIDE 10 MG/ML
1 INJECTION, SOLUTION INFILTRATION; PERINEURAL
Status: COMPLETED | OUTPATIENT
Start: 2021-07-22 | End: 2021-07-22

## 2021-07-23 RX ORDER — METHYLPREDNISOLONE ACETATE 80 MG/ML
80 INJECTION, SUSPENSION INTRA-ARTICULAR; INTRALESIONAL; INTRAMUSCULAR; SOFT TISSUE
Status: COMPLETED | OUTPATIENT
Start: 2021-07-22 | End: 2021-07-22

## 2021-08-23 ENCOUNTER — OFFICE VISIT (OUTPATIENT)
Dept: PODIATRY | Facility: CLINIC | Age: 86
End: 2021-08-23

## 2021-08-23 VITALS
TEMPERATURE: 97.6 F | HEIGHT: 67 IN | BODY MASS INDEX: 28.88 KG/M2 | OXYGEN SATURATION: 96 % | SYSTOLIC BLOOD PRESSURE: 160 MMHG | WEIGHT: 184 LBS | HEART RATE: 69 BPM | DIASTOLIC BLOOD PRESSURE: 54 MMHG

## 2021-08-23 DIAGNOSIS — M20.41 HAMMER TOES OF BOTH FEET: ICD-10-CM

## 2021-08-23 DIAGNOSIS — M20.42 HAMMER TOES OF BOTH FEET: ICD-10-CM

## 2021-08-23 DIAGNOSIS — L60.0 ONYCHOCRYPTOSIS: ICD-10-CM

## 2021-08-23 DIAGNOSIS — M79.671 FOOT PAIN, BILATERAL: Primary | ICD-10-CM

## 2021-08-23 DIAGNOSIS — M79.672 FOOT PAIN, BILATERAL: Primary | ICD-10-CM

## 2021-08-23 DIAGNOSIS — M20.11 VALGUS DEFORMITY OF BOTH GREAT TOES: ICD-10-CM

## 2021-08-23 DIAGNOSIS — M20.12 VALGUS DEFORMITY OF BOTH GREAT TOES: ICD-10-CM

## 2021-08-23 DIAGNOSIS — B35.1 ONYCHOMYCOSIS: ICD-10-CM

## 2021-08-23 PROCEDURE — 11721 DEBRIDE NAIL 6 OR MORE: CPT | Performed by: PODIATRIST

## 2021-08-23 NOTE — PROGRESS NOTES
[facility] - PODIATRY    Today's Date: 08/23/21    Patient Name: Megha Hardy  MRN: [id number]  CSN: [id number]  PCP: Provider, No Known  Referring Provider: No ref. provider found    SUBJECTIVE     Chief Complaint   Patient presents with   • Left Foot - Nail Problem   • Right Foot - Nail Problem     HPI: Megha Hardy, a 89 y.o.female, comes to clinic.    New, Established, New Problem:  established   Location:  Toenails  Duration:   Greater than five years  Onset:  Gradual  Nature:  sore with palpation.  Stable, worsening, improving:   Stable  Aggravating factors:  Pain with shoe gear and ambulation.  Previous Treatment:  debridement    Patient reports the following medical changes since their last visit: None.    No other pedal complaints at this time.    Patient denies any fevers, chills, nausea, vomiting, shortness of breathe, nor any other constitutional signs nor symptoms.       Last PCP Visit: Joaquín June 2021    Past Medical History:   Diagnosis Date   • Ankle pain    • Arthritis    • Bladder disorder    • Broken shoulder 1960   • Bronchitis    • Bunion    • Cancer, skin, squamous cell    • Cerumen impaction    • Condition not found     Ulcer   • Corns and callus    • Foot cramps    • Foot pain, bilateral    • Hallux valgus of left foot 03/15/2018   • Hallux valgus of right foot 03/15/2018   • Hammer toe    • Head injury 1960    head injury from falling off back of truck   • Hearing loss    • Heart disease    • High cholesterol    • History of kidney problems    • History of total knee arthroplasty, left 05/07/2018 11/03/2018   • Hyperlipidemia    • Hypertension    • Ingrowing toenail    • Kidney disease    • Limb swelling    • Nausea    • Numbness in feet    • Obesity    • Primary osteoarthritis of left knee 09/11/2017   • Shortness of breath    • Sinus congestion    • Tinea unguium      Past Surgical History:   Procedure Laterality Date   • CORONARY ANGIOPLASTY WITH STENT PLACEMENT   2006   • OTHER SURGICAL HISTORY      Artificial Joints/Limbs   • SHOULDER SURGERY Left 1963   • SKIN CANCER EXCISION     • TOTAL KNEE ARTHROPLASTY Left 1983   • TOTAL KNEE ARTHROPLASTY Right 2004     Family History   Problem Relation Age of Onset   • Arthritis Mother    • Stroke Mother    • Heart disease Mother    • Diabetes Mother         Mellitus   • Arthritis Father    • Heart disease Father    • Colon cancer Brother 50     Social History     Socioeconomic History   • Marital status:      Spouse name: Not on file   • Number of children: Not on file   • Years of education: Not on file   • Highest education level: Not on file   Tobacco Use   • Smoking status: Never Smoker   • Smokeless tobacco: Never Used   Vaping Use   • Vaping Use: Never used   Substance and Sexual Activity   • Alcohol use: Never   • Drug use: Never   • Sexual activity: Defer     Allergies   Allergen Reactions   • Sulfa Antibiotics Rash     Current Outpatient Medications   Medication Sig Dispense Refill   • amLODIPine (NORVASC) 2.5 MG tablet      • aspirin 81 MG chewable tablet aspirin 81 mg oral tablet,chewable chew 1 tablet (81 mg) by oral route once daily   Suspended     • atorvastatin (LIPITOR) 40 MG tablet      • azelastine (ASTELIN) 0.1 % nasal spray      • Calcium Carbonate-Vitamin D (Calcium-Vitamin D) 600-125 MG-UNIT tablet Calcium 600 + D(3) 600-125 mg-unit oral tablet take 1 tablet by oral route daily   Active     • Cholecalciferol 125 MCG (5000 UT) tablet Vitamin D3 125 mcg (5,000 unit) oral tablet take 1 tablet by oral route daily   Active     • furosemide (LASIX) 20 MG tablet      • gabapentin (NEURONTIN) 300 MG capsule gabapentin 300 mg oral capsule take 1 capsule (300 mg) by oral route 1 times per day   Suspended     • metoprolol succinate XL (TOPROL-XL) 25 MG 24 hr tablet      • pantoprazole (PROTONIX) 20 MG EC tablet      • ticagrelor (Brilinta) 90 MG tablet tablet Brilinta 90 mg oral tablet take 1 tablet (90 mg) by oral  route 2 times per day   Suspended     • valsartan (Diovan) 40 MG tablet Diovan 40 mg oral tablet take 1 tablet by oral route daily   Active       No current facility-administered medications for this visit.     Review of Systems   Constitutional: Negative.    Skin:        Painful toenails.   All other systems reviewed and are negative.      OBJECTIVE     Vitals:    21 1030   BP: 160/54   Pulse: 69   Temp: 97.6 °F (36.4 °C)   SpO2: 96%       Patient seen in no apparent distress.      PHYSICAL EXAM:     Foot/Ankle Exam:       General:   Appearance: elderly    Orientation: AAOx3    Affect: appropriate    Gait: unimpaired    Shoe Gear:  Casual shoes    VASCULAR      Right Foot Vascularity   Normal vascular exam    Dorsalis pedis:  2+  Posterior tibial:  2+  Skin Temperature: warm    Edema Gradin+ and pitting  CFT:  < 3 seconds  Pedal Hair Growth:  Absent  Varicosities: severe varicosities       Left Foot Vascularity   Normal vascular exam    Dorsalis pedis:  2+  Posterior tibial:  2+  Skin Temperature: warm    Edema Grading:  Pitting and 2+  CFT:  < 3 seconds  Pedal Hair Growth:  Absent  Varicosities: severe varicosities        NEUROLOGIC     Right Foot Neurologic   Normal sensation    Light touch sensation:  Normal  Vibratory sensation:  Normal  Hot/Cold sensation: normal    Protective Sensation using [place] Monofilament:  10     Left Foot Neurologic   Normal sensation    Light touch sensation:  Normal  Vibratory sensation:  Normal  Hot/cold sensation: normal    Protective Sensation using [place] Monofilament:  10     MUSCULOSKELETAL      Right Foot Musculoskeletal   Hammertoe:  Second toe and third toe  Hallux valgus: Yes       Left Foot Musculoskeletal   Hammertoe:  Second toe and third toe  Hallux valgus: Yes       MUSCLE STRENGTH     Right Foot Muscle Strength   Foot dorsiflexion:  4+  Foot plantar flexion:  4+  Foot inversion:  4+  Foot eversion:  4+     Left Foot Muscle Strength    Foot dorsiflexion:  4+  Foot plantar flexion:  4+  Foot inversion:  4+  Foot eversion:  4+     RANGE OF MOTION      Right Foot Range of Motion   Foot and ankle ROM within normal limits       Left Foot Range of Motion   Foot and ankle ROM within normal limits       DERMATOLOGIC     Right Foot Dermatologic   Skin: skin intact    Nails: onychomycosis, abnormally thick, subungual debris, dystrophic nails and ingrown toenail    Nails comment:  Toenails 1, 2, 3, 4, and 5     Left Foot Dermatologic   Skin: skin intact    Nails: onychomycosis, abnormally thick, subungual debris, dystrophic nails and ingrown toenail    Nails comment:  Toenails 1, 2, 3, 4, and 5      ASSESSMENT/PLAN     Diagnoses and all orders for this visit:    1. Foot pain, bilateral (Primary)    2. Onychomycosis    3. Onychocryptosis    4. Valgus deformity of both great toes    5. Hammer toes of both feet        Comprehensive lower extremity examination and evaluation was performed.    Discussed findings and treatment plan including risks, benefits, and treatment options with patient in detail. Patient agreed with treatment plan.    Nails 1 through 5 bilaterally were debrided in thickness and length and then smoothed with a Dremel Tool.  Tolerated the procedure well without complications.    An After Visit Summary was printed and given to the patient at discharge, including (if requested) any available informative/educational handouts regarding diagnosis, treatment, or medications. All questions were answered to patient/family satisfaction. Should symptoms fail to improve or worsen they agree to call or return to clinic or to go to the Emergency Department. Discussed the importance of following up with any needed screening tests/labs/specialist appointments and any requested follow-up recommended by me today. Importance of maintaining follow-up discussed and patient accepts that missed appointments can delay diagnosis and potentially lead to worsening  of conditions.    Return in about 9 weeks (around 10/25/2021) for Toenail Care., or sooner if acute issues arise.    This document has been electronically signed by Aidan Lundberg DPM on August 23, 2021 11:07 EDT

## 2021-11-09 ENCOUNTER — OFFICE VISIT (OUTPATIENT)
Dept: PODIATRY | Facility: CLINIC | Age: 86
End: 2021-11-09

## 2021-11-09 VITALS
BODY MASS INDEX: 29.35 KG/M2 | HEIGHT: 67 IN | DIASTOLIC BLOOD PRESSURE: 69 MMHG | SYSTOLIC BLOOD PRESSURE: 162 MMHG | OXYGEN SATURATION: 96 % | HEART RATE: 64 BPM | TEMPERATURE: 97.3 F | WEIGHT: 187 LBS

## 2021-11-09 DIAGNOSIS — M79.672 FOOT PAIN, BILATERAL: Primary | ICD-10-CM

## 2021-11-09 DIAGNOSIS — M20.12 VALGUS DEFORMITY OF BOTH GREAT TOES: ICD-10-CM

## 2021-11-09 DIAGNOSIS — M20.42 HAMMER TOES OF BOTH FEET: ICD-10-CM

## 2021-11-09 DIAGNOSIS — B35.1 ONYCHOMYCOSIS: ICD-10-CM

## 2021-11-09 DIAGNOSIS — M20.41 HAMMER TOES OF BOTH FEET: ICD-10-CM

## 2021-11-09 DIAGNOSIS — M79.671 FOOT PAIN, BILATERAL: Primary | ICD-10-CM

## 2021-11-09 DIAGNOSIS — M20.11 VALGUS DEFORMITY OF BOTH GREAT TOES: ICD-10-CM

## 2021-11-09 DIAGNOSIS — L60.0 ONYCHOCRYPTOSIS: ICD-10-CM

## 2021-11-09 PROCEDURE — 11721 DEBRIDE NAIL 6 OR MORE: CPT | Performed by: PODIATRIST

## 2021-11-09 RX ORDER — MULTIPLE VITAMINS W/ MINERALS TAB 9MG-400MCG
TAB ORAL
COMMUNITY

## 2021-11-09 NOTE — PROGRESS NOTES
[facility]    Today's Date: 11/09/21    Patient Name: Megha Hardy  MRN: [id number]  CSN: [id number]  PCP: Provider, No Known  Referring Provider: No ref. provider found    SUBJECTIVE     Chief Complaint   Patient presents with   • Left Foot - Nail Problem   • Right Foot - Nail Problem     HPI: Megha Hardy, a 89 y.o.female, comes to clinic.    New, Established, New Problem:  established   Location:  Toenails  Duration:   Greater than five years  Onset:  Gradual  Nature:  sore with palpation.  Stable, worsening, improving:   Stable  Aggravating factors:  Pain with shoe gear and ambulation.  Previous Treatment:  debridement    Patient reports the following medical changes since their last visit: Skin cancer removal from left forearm    No other pedal complaints at this time.    Patient denies any fevers, chills, nausea, vomiting, shortness of breathe, nor any other constitutional signs nor symptoms.       Last PCP Visit:  Timur Berry MD, September 2021    Past Medical History:   Diagnosis Date   • Ankle pain    • Arthritis    • Bladder disorder    • Broken shoulder 1960   • Bronchitis    • Bunion    • Cancer, skin, squamous cell    • Cerumen impaction    • Condition not found     Ulcer   • Corns and callus    • Foot cramps    • Foot pain, bilateral    • Hallux valgus of left foot 03/15/2018   • Hallux valgus of right foot 03/15/2018   • Hammer toe    • Head injury 1960    head injury from falling off back of truck   • Hearing loss    • Heart disease    • High cholesterol    • History of kidney problems    • History of total knee arthroplasty, left 05/07/2018 11/03/2018   • Hyperlipidemia    • Hypertension    • Ingrowing toenail    • Kidney disease    • Limb swelling    • Nausea    • Numbness in feet    • Obesity    • Primary osteoarthritis of left knee 09/11/2017   • Shortness of breath    • Sinus congestion    • Tinea unguium      Past Surgical History:   Procedure Laterality Date    • CORONARY ANGIOPLASTY WITH STENT PLACEMENT  2006   • OTHER SURGICAL HISTORY      Artificial Joints/Limbs   • SHOULDER SURGERY Left 1963   • SKIN CANCER EXCISION     • TOTAL KNEE ARTHROPLASTY Left 1983   • TOTAL KNEE ARTHROPLASTY Right 2004     Family History   Problem Relation Age of Onset   • Arthritis Mother    • Stroke Mother    • Heart disease Mother    • Diabetes Mother         Mellitus   • Arthritis Father    • Heart disease Father    • Colon cancer Brother 50     Social History     Socioeconomic History   • Marital status:    Tobacco Use   • Smoking status: Never Smoker   • Smokeless tobacco: Never Used   Vaping Use   • Vaping Use: Never used   Substance and Sexual Activity   • Alcohol use: Never   • Drug use: Never   • Sexual activity: Defer     Allergies   Allergen Reactions   • Sulfa Antibiotics Rash     Current Outpatient Medications   Medication Sig Dispense Refill   • amLODIPine (NORVASC) 2.5 MG tablet      • aspirin 81 MG chewable tablet aspirin 81 mg oral tablet,chewable chew 1 tablet (81 mg) by oral route once daily   Suspended     • atorvastatin (LIPITOR) 40 MG tablet      • azelastine (ASTELIN) 0.1 % nasal spray      • Calcium Carbonate-Vitamin D (Calcium-Vitamin D) 600-125 MG-UNIT tablet Calcium 600 + D(3) 600-125 mg-unit oral tablet take 1 tablet by oral route daily   Active     • Cholecalciferol 125 MCG (5000 UT) tablet Vitamin D3 125 mcg (5,000 unit) oral tablet take 1 tablet by oral route daily   Active     • furosemide (LASIX) 20 MG tablet      • gabapentin (NEURONTIN) 300 MG capsule gabapentin 300 mg oral capsule take 1 capsule (300 mg) by oral route 1 times per day   Suspended     • metoprolol succinate XL (TOPROL-XL) 25 MG 24 hr tablet      • Multiple Vitamins-Minerals (PRESERVISION AREDS 2+MULTI VIT PO) PreserVision AREDS-2 346-712-21-1 mg-unit-mg-mg oral capsule take 1 capsule by oral route daily   Active     • multivitamin with minerals (MULTIVITAMIN ADULT PO) 1qd     •  pantoprazole (PROTONIX) 20 MG EC tablet      • ticagrelor (Brilinta) 90 MG tablet tablet Brilinta 90 mg oral tablet take 1 tablet (90 mg) by oral route 2 times per day   Suspended     • valsartan (Diovan) 40 MG tablet Diovan 40 mg oral tablet take 1 tablet by oral route daily   Active       No current facility-administered medications for this visit.     Review of Systems   Constitutional: Negative.    Skin:        Painful toenails.   All other systems reviewed and are negative.      OBJECTIVE     Vitals:    21 1058   BP: 162/69   Pulse: 64   Temp: 97.3 °F (36.3 °C)   SpO2: 96%       Patient seen in no apparent distress.      PHYSICAL EXAM:     Foot/Ankle Exam:       General:   Appearance: elderly    Orientation: AAOx3    Affect: appropriate    Gait: unimpaired    Shoe Gear:  Casual shoes    VASCULAR      Right Foot Vascularity   Normal vascular exam    Dorsalis pedis:  2+  Posterior tibial:  2+  Skin Temperature: warm    Edema Gradin+ and pitting  CFT:  < 3 seconds  Pedal Hair Growth:  Absent  Varicosities: severe varicosities       Left Foot Vascularity   Normal vascular exam    Dorsalis pedis:  2+  Posterior tibial:  2+  Skin Temperature: warm    Edema Grading:  Pitting and 2+  CFT:  < 3 seconds  Pedal Hair Growth:  Absent  Varicosities: severe varicosities        NEUROLOGIC     Right Foot Neurologic   Normal sensation    Light touch sensation:  Normal  Vibratory sensation:  Normal  Hot/Cold sensation: normal    Protective Sensation using [place] Monofilament:  10     Left Foot Neurologic   Normal sensation    Light touch sensation:  Normal  Vibratory sensation:  Normal  Hot/cold sensation: normal    Protective Sensation using [place]ell Monofilament:  10     MUSCULOSKELETAL      Right Foot Musculoskeletal   Hammertoe:  Second toe and third toe  Hallux valgus: Yes       Left Foot Musculoskeletal   Hammertoe:  Second toe and third toe  Hallux valgus: Yes       MUSCLE STRENGTH     Right  Foot Muscle Strength   Foot dorsiflexion:  4+  Foot plantar flexion:  4+  Foot inversion:  4+  Foot eversion:  4+     Left Foot Muscle Strength   Foot dorsiflexion:  4+  Foot plantar flexion:  4+  Foot inversion:  4+  Foot eversion:  4+     RANGE OF MOTION      Right Foot Range of Motion   Foot and ankle ROM within normal limits       Left Foot Range of Motion   Foot and ankle ROM within normal limits       DERMATOLOGIC     Right Foot Dermatologic   Skin: skin intact    Nails: onychomycosis, abnormally thick, subungual debris, dystrophic nails and ingrown toenail    Nails comment:  Toenails 1, 2, 3, 4, and 5     Left Foot Dermatologic   Skin: skin intact    Nails: onychomycosis, abnormally thick, subungual debris, dystrophic nails and ingrown toenail    Nails comment:  Toenails 1, 2, 3, 4, and 5      ASSESSMENT/PLAN     Diagnoses and all orders for this visit:    1. Foot pain, bilateral (Primary)    2. Onychomycosis    3. Onychocryptosis    4. Valgus deformity of both great toes    5. Hammer toes of both feet        Comprehensive lower extremity examination and evaluation was performed.    Discussed findings and treatment plan including risks, benefits, and treatment options with patient in detail. Patient agreed with treatment plan.    Nails 1 through 5 bilaterally were debrided in thickness and length and then smoothed with a Dremel Tool.  Tolerated the procedure well without complications.    An After Visit Summary was printed and given to the patient at discharge, including (if requested) any available informative/educational handouts regarding diagnosis, treatment, or medications. All questions were answered to patient/family satisfaction. Should symptoms fail to improve or worsen they agree to call or return to clinic or to go to the Emergency Department. Discussed the importance of following up with any needed screening tests/labs/specialist appointments and any requested follow-up recommended by me today.  Importance of maintaining follow-up discussed and patient accepts that missed appointments can delay diagnosis and potentially lead to worsening of conditions.    Return in about 9 weeks (around 1/11/2022) for Toenail Care., or sooner if acute issues arise.    This document has been electronically signed by Aidan Lundberg DPM on November 9, 2021 11:08 EST

## 2022-02-01 ENCOUNTER — OFFICE VISIT (OUTPATIENT)
Dept: PODIATRY | Facility: CLINIC | Age: 87
End: 2022-02-01

## 2022-02-01 VITALS
HEART RATE: 62 BPM | DIASTOLIC BLOOD PRESSURE: 69 MMHG | SYSTOLIC BLOOD PRESSURE: 151 MMHG | OXYGEN SATURATION: 99 % | WEIGHT: 195 LBS | BODY MASS INDEX: 30.61 KG/M2 | HEIGHT: 67 IN | TEMPERATURE: 97.5 F

## 2022-02-01 DIAGNOSIS — M20.12 VALGUS DEFORMITY OF BOTH GREAT TOES: ICD-10-CM

## 2022-02-01 DIAGNOSIS — M20.11 VALGUS DEFORMITY OF BOTH GREAT TOES: ICD-10-CM

## 2022-02-01 DIAGNOSIS — M20.42 HAMMER TOES OF BOTH FEET: ICD-10-CM

## 2022-02-01 DIAGNOSIS — M79.672 FOOT PAIN, BILATERAL: Primary | ICD-10-CM

## 2022-02-01 DIAGNOSIS — M79.671 FOOT PAIN, BILATERAL: Primary | ICD-10-CM

## 2022-02-01 DIAGNOSIS — M20.10 VALGUS DEFORMITY OF GREAT TOE, UNSPECIFIED LATERALITY: ICD-10-CM

## 2022-02-01 DIAGNOSIS — B35.1 ONYCHOMYCOSIS: ICD-10-CM

## 2022-02-01 DIAGNOSIS — M20.41 HAMMER TOES OF BOTH FEET: ICD-10-CM

## 2022-02-01 DIAGNOSIS — L60.0 ONYCHOCRYPTOSIS: ICD-10-CM

## 2022-02-01 PROCEDURE — 11721 DEBRIDE NAIL 6 OR MORE: CPT | Performed by: PODIATRIST

## 2022-02-01 RX ORDER — FAMOTIDINE 20 MG/1
TABLET, FILM COATED ORAL
COMMUNITY
Start: 2022-01-25

## 2022-02-01 NOTE — PROGRESS NOTES
[facility]    Today's Date: 02/01/22    Patient Name: Megha Hardy  MRN: [id number]  CSN: [id number]  PCP: Loretta Berry MD, Last PCP Visit: 1 September 2021  Referring Provider: No ref. provider found    SUBJECTIVE     Chief Complaint   Patient presents with   • Left Foot - Nail Problem   • Right Foot - Nail Problem     HPI: Megha Hardy, a 89 y.o.female, comes to clinic.    New, Established, New Problem:  established   Location:  Toenails  Duration:   Greater than five years  Onset:  Gradual  Nature:  sore with palpation.  Stable, worsening, improving:   Stable  Aggravating factors:  Pain with shoe gear and ambulation.  Previous Treatment:  debridement    Patient reports the following medical changes since their last visit:  Being followed up for wound on left forearm status post excision for skin cancer    No other pedal complaints at this time.    Patient denies any fevers, chills, nausea, vomiting, shortness of breathe, nor any other constitutional signs nor symptoms.       Past Medical History:   Diagnosis Date   • Ankle pain    • Arthritis    • Bladder disorder    • Broken shoulder 1960   • Bronchitis    • Bunion    • Cancer, skin, squamous cell    • Cerumen impaction    • Condition not found     Ulcer   • Corns and callus    • Foot cramps    • Foot pain, bilateral    • Hallux valgus of left foot 03/15/2018   • Hallux valgus of right foot 03/15/2018   • Hammer toe    • Head injury 1960    head injury from falling off back of truck   • Hearing loss    • Heart disease    • High cholesterol    • History of kidney problems    • History of total knee arthroplasty, left 05/07/2018 11/03/2018   • Hyperlipidemia    • Hypertension    • Ingrowing toenail    • Kidney disease    • Limb swelling    • Nausea    • Numbness in feet    • Obesity    • Primary osteoarthritis of left knee 09/11/2017   • Shortness of breath    • Sinus congestion    • Tinea unguium      Past Surgical History:    Procedure Laterality Date   • CORONARY ANGIOPLASTY WITH STENT PLACEMENT  2006   • OTHER SURGICAL HISTORY      Artificial Joints/Limbs   • SHOULDER SURGERY Left 1963   • SKIN CANCER EXCISION     • TOTAL KNEE ARTHROPLASTY Left 1983   • TOTAL KNEE ARTHROPLASTY Right 2004     Family History   Problem Relation Age of Onset   • Arthritis Mother    • Stroke Mother    • Heart disease Mother    • Diabetes Mother         Mellitus   • Arthritis Father    • Heart disease Father    • Colon cancer Brother 50     Social History     Socioeconomic History   • Marital status:    Tobacco Use   • Smoking status: Never Smoker   • Smokeless tobacco: Never Used   Vaping Use   • Vaping Use: Never used   Substance and Sexual Activity   • Alcohol use: Never   • Drug use: Never   • Sexual activity: Defer     Allergies   Allergen Reactions   • Sulfa Antibiotics Rash     Current Outpatient Medications   Medication Sig Dispense Refill   • amLODIPine (NORVASC) 2.5 MG tablet      • aspirin 81 MG chewable tablet aspirin 81 mg oral tablet,chewable chew 1 tablet (81 mg) by oral route once daily   Suspended     • atorvastatin (LIPITOR) 40 MG tablet      • azelastine (ASTELIN) 0.1 % nasal spray      • Calcium Carbonate-Vitamin D (Calcium-Vitamin D) 600-125 MG-UNIT tablet Calcium 600 + D(3) 600-125 mg-unit oral tablet take 1 tablet by oral route daily   Active     • Cholecalciferol 125 MCG (5000 UT) tablet Vitamin D3 125 mcg (5,000 unit) oral tablet take 1 tablet by oral route daily   Active     • famotidine (PEPCID) 20 MG tablet      • furosemide (LASIX) 20 MG tablet      • gabapentin (NEURONTIN) 300 MG capsule gabapentin 300 mg oral capsule take 1 capsule (300 mg) by oral route 1 times per day   Suspended     • metoprolol succinate XL (TOPROL-XL) 25 MG 24 hr tablet      • Multiple Vitamins-Minerals (PRESERVISION AREDS 2+MULTI VIT PO) PreserVision AREDS-2 130-888-68-1 mg-unit-mg-mg oral capsule take 1 capsule by oral route daily   Active     •  multivitamin with minerals (MULTIVITAMIN ADULT PO) 1qd     • pantoprazole (PROTONIX) 20 MG EC tablet      • ticagrelor (Brilinta) 90 MG tablet tablet Brilinta 90 mg oral tablet take 1 tablet (90 mg) by oral route 2 times per day   Suspended     • valsartan (Diovan) 40 MG tablet Diovan 40 mg oral tablet take 1 tablet by oral route daily   Active       No current facility-administered medications for this visit.     Review of Systems   Constitutional: Negative.    Skin:        Painful toenails.   All other systems reviewed and are negative.      OBJECTIVE     Vitals:    22 1104   BP: 151/69   Pulse: 62   Temp: 97.5 °F (36.4 °C)   SpO2: 99%       Patient seen in no apparent distress.      PHYSICAL EXAM:     Foot/Ankle Exam:       General:   Appearance: elderly    Appearance comment:  Chronically ill  Orientation: AAOx3    Affect: appropriate    Gait: unimpaired    Shoe Gear:  Casual shoes    VASCULAR      Right Foot Vascularity   Normal vascular exam    Dorsalis pedis:  2+  Posterior tibial:  2+  Skin Temperature: warm    Edema Gradin+ and pitting  CFT:  < 3 seconds  Pedal Hair Growth:  Absent  Varicosities: severe varicosities       Left Foot Vascularity   Normal vascular exam    Dorsalis pedis:  2+  Posterior tibial:  2+  Skin Temperature: warm    Edema Grading:  Pitting and 2+  CFT:  < 3 seconds  Pedal Hair Growth:  Absent  Varicosities: severe varicosities        NEUROLOGIC     Right Foot Neurologic   Normal sensation    Light touch sensation:  Normal  Vibratory sensation:  Normal  Hot/Cold sensation: normal    Protective Sensation using [place] Monofilament:  10     Left Foot Neurologic   Normal sensation    Light touch sensation:  Normal  Vibratory sensation:  Normal  Hot/cold sensation: normal    Protective Sensation using [place] Monofilament:  10     MUSCULOSKELETAL      Right Foot Musculoskeletal   Hammertoe:  Second toe and third toe  Hallux valgus: Yes       Left Foot  Musculoskeletal   Hammertoe:  Second toe and third toe  Hallux valgus: Yes       MUSCLE STRENGTH     Right Foot Muscle Strength   Foot dorsiflexion:  4+  Foot plantar flexion:  4+  Foot inversion:  4+  Foot eversion:  4+     Left Foot Muscle Strength   Foot dorsiflexion:  4+  Foot plantar flexion:  4+  Foot inversion:  4+  Foot eversion:  4+     RANGE OF MOTION      Right Foot Range of Motion   Foot and ankle ROM within normal limits       Left Foot Range of Motion   Foot and ankle ROM within normal limits       DERMATOLOGIC     Right Foot Dermatologic   Skin: skin intact    Nails: onychomycosis, abnormally thick, subungual debris, dystrophic nails and ingrown toenail    Nails comment:  Toenails 1, 2, 3, 4, and 5     Left Foot Dermatologic   Skin: skin intact    Nails: onychomycosis, abnormally thick, subungual debris, dystrophic nails and ingrown toenail    Nails comment:  Toenails 1, 2, 3, 4, and 5      ASSESSMENT/PLAN     Diagnoses and all orders for this visit:    1. Foot pain, bilateral (Primary)    2. Onychomycosis    3. Onychocryptosis    4. Valgus deformity of great toe, unspecified laterality    5. Valgus deformity of both great toes    6. Hammer toes of both feet        Comprehensive lower extremity examination and evaluation was performed.    Discussed findings and treatment plan including risks, benefits, and treatment options with patient in detail. Patient agreed with treatment plan.    Nails 1 through 5 bilaterally were debrided in thickness and length and then smoothed with a Dremel Tool.  Tolerated the procedure well without complications.    An After Visit Summary was printed and given to the patient at discharge, including (if requested) any available informative/educational handouts regarding diagnosis, treatment, or medications. All questions were answered to patient/family satisfaction. Should symptoms fail to improve or worsen they agree to call or return to clinic or to go to the Emergency  Department. Discussed the importance of following up with any needed screening tests/labs/specialist appointments and any requested follow-up recommended by me today. Importance of maintaining follow-up discussed and patient accepts that missed appointments can delay diagnosis and potentially lead to worsening of conditions.    Return in about 9 weeks (around 4/5/2022) for Toenail Care., or sooner if acute issues arise.    This document has been electronically signed by Aidan Lundberg DPM on February 1, 2022 11:30 EST

## 2022-04-27 ENCOUNTER — OFFICE VISIT (OUTPATIENT)
Dept: PODIATRY | Facility: CLINIC | Age: 87
End: 2022-04-27

## 2022-04-27 VITALS
HEART RATE: 69 BPM | BODY MASS INDEX: 29.19 KG/M2 | SYSTOLIC BLOOD PRESSURE: 141 MMHG | WEIGHT: 186 LBS | OXYGEN SATURATION: 100 % | HEIGHT: 67 IN | DIASTOLIC BLOOD PRESSURE: 64 MMHG | TEMPERATURE: 97.3 F

## 2022-04-27 DIAGNOSIS — M20.12 VALGUS DEFORMITY OF BOTH GREAT TOES: Primary | ICD-10-CM

## 2022-04-27 DIAGNOSIS — B35.1 ONYCHOMYCOSIS: ICD-10-CM

## 2022-04-27 DIAGNOSIS — M79.672 FOOT PAIN, BILATERAL: ICD-10-CM

## 2022-04-27 DIAGNOSIS — M20.10 VALGUS DEFORMITY OF GREAT TOE, UNSPECIFIED LATERALITY: ICD-10-CM

## 2022-04-27 DIAGNOSIS — M79.671 FOOT PAIN, BILATERAL: ICD-10-CM

## 2022-04-27 DIAGNOSIS — M20.42 HAMMER TOES OF BOTH FEET: ICD-10-CM

## 2022-04-27 DIAGNOSIS — M20.41 HAMMER TOES OF BOTH FEET: ICD-10-CM

## 2022-04-27 DIAGNOSIS — L60.0 ONYCHOCRYPTOSIS: ICD-10-CM

## 2022-04-27 DIAGNOSIS — M20.11 VALGUS DEFORMITY OF BOTH GREAT TOES: Primary | ICD-10-CM

## 2022-04-27 PROCEDURE — 11721 DEBRIDE NAIL 6 OR MORE: CPT | Performed by: PODIATRIST

## 2022-04-27 RX ORDER — AMLODIPINE BESYLATE 10 MG/1
TABLET ORAL
COMMUNITY
Start: 2022-04-26

## 2022-04-27 RX ORDER — POTASSIUM CHLORIDE 750 MG/1
TABLET, EXTENDED RELEASE ORAL 2 TIMES DAILY
COMMUNITY
Start: 2022-04-26

## 2022-04-27 NOTE — PROGRESS NOTES
[facility] - P[facility]ATRY    Today's Date: 04/27/22    Patient Name: Megha Hardy  MRN: [id number]  CSN: [id number]  PCP: Loretta Berry MD, Last PCP Visit: 4/20/2022  Referring Provider: No ref. provider found    SUBJECTIVE     Chief Complaint   Patient presents with   • Left Foot - Nail Problem   • Right Foot - Nail Problem     HPI: Megha Hardy, a 89 y.o.female, comes to clinic.    New, Established, New Problem:  established   Location:  Toenails  Duration:   Greater than five years  Onset:  Gradual  Nature:  sore with palpation.  Stable, worsening, improving:   Stable  Aggravating factors:  Pain with shoe gear and ambulation.  Previous Treatment:  debridement    Patient reports the following medical changes since their last visit:  none.    Patient denies any fevers, chills, nausea, vomiting, shortness of breathe, nor any other constitutional signs nor symptoms.       Past Medical History:   Diagnosis Date   • Ankle pain    • Arthritis    • Bladder disorder    • Broken shoulder 1960   • Bronchitis    • Bunion    • Cancer, skin, squamous cell    • Cerumen impaction    • Condition not found     Ulcer   • Corns and callus    • Foot cramps    • Foot pain, bilateral    • Hallux valgus of left foot 03/15/2018   • Hallux valgus of right foot 03/15/2018   • Hammer toe    • Head injury 1960    head injury from falling off back of truck   • Hearing loss    • Heart disease    • High cholesterol    • History of kidney problems    • History of total knee arthroplasty, left 05/07/2018 11/03/2018   • Hyperlipidemia    • Hypertension    • Ingrowing toenail    • Kidney disease    • Limb swelling    • Nausea    • Numbness in feet    • Obesity    • Primary osteoarthritis of left knee 09/11/2017   • Shortness of breath    • Sinus congestion    • Tinea unguium      Past Surgical History:   Procedure Laterality Date   • CORONARY ANGIOPLASTY WITH STENT PLACEMENT  2006   • OTHER SURGICAL HISTORY      Artificial  Joints/Limbs   • SHOULDER SURGERY Left 1963   • SKIN CANCER EXCISION     • TOTAL KNEE ARTHROPLASTY Left 1983   • TOTAL KNEE ARTHROPLASTY Right 2004     Family History   Problem Relation Age of Onset   • Arthritis Mother    • Stroke Mother    • Heart disease Mother    • Diabetes Mother         Mellitus   • Arthritis Father    • Heart disease Father    • Colon cancer Brother 50     Social History     Socioeconomic History   • Marital status:    Tobacco Use   • Smoking status: Never Smoker   • Smokeless tobacco: Never Used   Vaping Use   • Vaping Use: Never used   Substance and Sexual Activity   • Alcohol use: Never   • Drug use: Never   • Sexual activity: Defer     Allergies   Allergen Reactions   • Sulfa Antibiotics Rash     Current Outpatient Medications   Medication Sig Dispense Refill   • amLODIPine (NORVASC) 10 MG tablet      • aspirin 81 MG chewable tablet aspirin 81 mg oral tablet,chewable chew 1 tablet (81 mg) by oral route once daily   Suspended     • atorvastatin (LIPITOR) 40 MG tablet      • azelastine (ASTELIN) 0.1 % nasal spray      • Calcium Carbonate-Vitamin D (Calcium-Vitamin D) 600-125 MG-UNIT tablet Calcium 600 + D(3) 600-125 mg-unit oral tablet take 1 tablet by oral route daily   Active     • Cholecalciferol 125 MCG (5000 UT) tablet Vitamin D3 125 mcg (5,000 unit) oral tablet take 1 tablet by oral route daily   Active     • famotidine (PEPCID) 20 MG tablet      • furosemide (LASIX) 20 MG tablet      • gabapentin (NEURONTIN) 300 MG capsule gabapentin 300 mg oral capsule take 1 capsule (300 mg) by oral route 1 times per day   Suspended     • metoprolol succinate XL (TOPROL-XL) 25 MG 24 hr tablet      • Multiple Vitamins-Minerals (PRESERVISION AREDS 2+MULTI VIT PO) PreserVision AREDS-2 387-256-25-1 mg-unit-mg-mg oral capsule take 1 capsule by oral route daily   Active     • multivitamin with minerals tablet tablet 1qd     • pantoprazole (PROTONIX) 20 MG EC tablet      • potassium chloride  (K-DUR,KLOR-CON) 10 MEQ CR tablet      • ticagrelor (BRILINTA) 90 MG tablet tablet Brilinta 90 mg oral tablet take 1 tablet (90 mg) by oral route 2 times per day   Suspended     • valsartan (DIOVAN) 40 MG tablet Diovan 40 mg oral tablet take 1 tablet by oral route daily   Active     • amLODIPine (NORVASC) 2.5 MG tablet        No current facility-administered medications for this visit.     Review of Systems   Constitutional: Negative.    Skin:        Painful toenails.   All other systems reviewed and are negative.      OBJECTIVE     Vitals:    22 1010   BP: 141/64   Pulse: 69   Temp: 97.3 °F (36.3 °C)   SpO2: 100%       Patient seen in no apparent distress.      PHYSICAL EXAM:     Foot/Ankle Exam:       General:   Appearance: elderly    Appearance comment:  Chronically ill  Orientation: AAOx3    Affect: appropriate    Gait: unimpaired    Shoe Gear:  Casual shoes    VASCULAR      Right Foot Vascularity   Normal vascular exam    Dorsalis pedis:  2+  Posterior tibial:  2+  Skin Temperature: warm    Edema Gradin+ and pitting  CFT:  < 3 seconds  Pedal Hair Growth:  Absent  Varicosities: severe varicosities       Left Foot Vascularity   Normal vascular exam    Dorsalis pedis:  2+  Posterior tibial:  2+  Skin Temperature: warm    Edema Grading:  Pitting and 2+  CFT:  < 3 seconds  Pedal Hair Growth:  Absent  Varicosities: severe varicosities        NEUROLOGIC     Right Foot Neurologic   Normal sensation    Light touch sensation:  Normal  Vibratory sensation:  Normal  Hot/Cold sensation: normal    Protective Sensation using [place] Monofilament:  10     Left Foot Neurologic   Normal sensation    Light touch sensation:  Normal  Vibratory sensation:  Normal  Hot/cold sensation: normal    Protective Sensation using [place] Monofilament:  10     MUSCULOSKELETAL      Right Foot Musculoskeletal   Hammertoe:  Second toe and third toe  Hallux valgus: Yes       Left Foot Musculoskeletal   Hammertoe:   Second toe and third toe  Hallux valgus: Yes       MUSCLE STRENGTH     Right Foot Muscle Strength   Foot dorsiflexion:  4+  Foot plantar flexion:  4+  Foot inversion:  4+  Foot eversion:  4+     Left Foot Muscle Strength   Foot dorsiflexion:  4+  Foot plantar flexion:  4+  Foot inversion:  4+  Foot eversion:  4+     RANGE OF MOTION      Right Foot Range of Motion   Foot and ankle ROM within normal limits       Left Foot Range of Motion   Foot and ankle ROM within normal limits       DERMATOLOGIC     Right Foot Dermatologic   Skin: skin intact    Nails: onychomycosis, abnormally thick, subungual debris, dystrophic nails and ingrown toenail    Nails comment:  Toenails 1, 2, 3, 4, and 5     Left Foot Dermatologic   Skin: skin intact    Nails: onychomycosis, abnormally thick, subungual debris, dystrophic nails and ingrown toenail    Nails comment:  Toenails 1, 2, 3, 4, and 5      ASSESSMENT/PLAN     Diagnoses and all orders for this visit:    1. Valgus deformity of both great toes (Primary)    2. Onychocryptosis    3. Foot pain, bilateral    4. Hammer toes of both feet    5. Valgus deformity of great toe, unspecified laterality    6. Onychomycosis        Comprehensive lower extremity examination and evaluation was performed.    Discussed findings and treatment plan including risks, benefits, and treatment options with patient in detail. Patient agreed with treatment plan.    Nails 1 through 5 bilaterally were debrided in thickness and length and then smoothed with a Dremel Tool.  Tolerated the procedure well without complications.    An After Visit Summary was printed and given to the patient at discharge, including (if requested) any available informative/educational handouts regarding diagnosis, treatment, or medications. All questions were answered to patient/family satisfaction. Should symptoms fail to improve or worsen they agree to call or return to clinic or to go to the Emergency Department. Discussed the  importance of following up with any needed screening tests/labs/specialist appointments and any requested follow-up recommended by me today. Importance of maintaining follow-up discussed and patient accepts that missed appointments can delay diagnosis and potentially lead to worsening of conditions.    Return in about 9 weeks (around 6/29/2022) for Toenail Care., or sooner if acute issues arise.    This document has been electronically signed by Aidan Lundberg DPM on April 27, 2022 10:39 EDT

## 2022-07-18 ENCOUNTER — OFFICE VISIT (OUTPATIENT)
Dept: PODIATRY | Facility: CLINIC | Age: 87
End: 2022-07-18

## 2022-07-18 VITALS
OXYGEN SATURATION: 96 % | BODY MASS INDEX: 29 KG/M2 | WEIGHT: 184.8 LBS | HEIGHT: 67 IN | SYSTOLIC BLOOD PRESSURE: 168 MMHG | DIASTOLIC BLOOD PRESSURE: 67 MMHG | TEMPERATURE: 96.7 F | HEART RATE: 67 BPM

## 2022-07-18 DIAGNOSIS — M79.672 FOOT PAIN, BILATERAL: ICD-10-CM

## 2022-07-18 DIAGNOSIS — M20.41 HAMMER TOES OF BOTH FEET: ICD-10-CM

## 2022-07-18 DIAGNOSIS — M79.671 FOOT PAIN, BILATERAL: ICD-10-CM

## 2022-07-18 DIAGNOSIS — M20.10 VALGUS DEFORMITY OF GREAT TOE, UNSPECIFIED LATERALITY: Primary | ICD-10-CM

## 2022-07-18 DIAGNOSIS — M20.12 VALGUS DEFORMITY OF BOTH GREAT TOES: ICD-10-CM

## 2022-07-18 DIAGNOSIS — M20.42 HAMMER TOES OF BOTH FEET: ICD-10-CM

## 2022-07-18 DIAGNOSIS — M20.11 VALGUS DEFORMITY OF BOTH GREAT TOES: ICD-10-CM

## 2022-07-18 DIAGNOSIS — B35.1 ONYCHOMYCOSIS: ICD-10-CM

## 2022-07-18 DIAGNOSIS — L60.0 ONYCHOCRYPTOSIS: ICD-10-CM

## 2022-07-18 PROCEDURE — 11721 DEBRIDE NAIL 6 OR MORE: CPT | Performed by: PODIATRIST

## 2022-07-18 NOTE — PROGRESS NOTES
[facility]    Today's Date: 07/18/22    Patient Name: Megha Hardy  MRN: [id number]  CSN: [id number]  PCP: Loretta Berry MD, Last PCP Visit: 20 April 2022  Referring Provider: No ref. provider found    SUBJECTIVE     Chief Complaint   Patient presents with   • Left Foot - Nail Problem   • Right Foot - Nail Problem     HPI: Megha Hardy, a 89 y.o.female, comes to clinic.    New, Established, New Problem:  established   Location:  Toenails  Duration:   Greater than five years  Onset:  Gradual  Nature:  sore with palpation.  Stable, worsening, improving:   Stable  Aggravating factors:  Pain with shoe gear and ambulation.  Previous Treatment:  debridement    No recent medical changes.    Patient denies any fevers, chills, nausea, vomiting, shortness of breathe, nor any other constitutional signs nor symptoms.       Past Medical History:   Diagnosis Date   • Ankle pain    • Arthritis    • Bladder disorder    • Broken shoulder 1960   • Bronchitis    • Bunion    • Cancer, skin, squamous cell    • Cerumen impaction    • Condition not found     Ulcer   • Corns and callus    • Foot cramps    • Foot pain, bilateral    • Hallux valgus of left foot 03/15/2018   • Hallux valgus of right foot 03/15/2018   • Hammer toe    • Head injury 1960    head injury from falling off back of truck   • Hearing loss    • Heart disease    • High cholesterol    • History of kidney problems    • History of total knee arthroplasty, left 05/07/2018 11/03/2018   • Hyperlipidemia    • Hypertension    • Ingrowing toenail    • Kidney disease    • Limb swelling    • Nausea    • Numbness in feet    • Obesity    • Primary osteoarthritis of left knee 09/11/2017   • Shortness of breath    • Sinus congestion    • Tinea unguium      Past Surgical History:   Procedure Laterality Date   • CORONARY ANGIOPLASTY WITH STENT PLACEMENT  2006   • OTHER SURGICAL HISTORY      Artificial Joints/Limbs   • SHOULDER SURGERY Left 1963   •  SKIN CANCER EXCISION     • TOTAL KNEE ARTHROPLASTY Left 1983   • TOTAL KNEE ARTHROPLASTY Right 2004     Family History   Problem Relation Age of Onset   • Arthritis Mother    • Stroke Mother    • Heart disease Mother    • Diabetes Mother         Mellitus   • Arthritis Father    • Heart disease Father    • Colon cancer Brother 50     Social History     Socioeconomic History   • Marital status:    Tobacco Use   • Smoking status: Never Smoker   • Smokeless tobacco: Never Used   Vaping Use   • Vaping Use: Never used   Substance and Sexual Activity   • Alcohol use: Never   • Drug use: Never   • Sexual activity: Defer     Allergies   Allergen Reactions   • Sulfa Antibiotics Rash     Current Outpatient Medications   Medication Sig Dispense Refill   • amLODIPine (NORVASC) 10 MG tablet      • amLODIPine (NORVASC) 2.5 MG tablet      • aspirin 81 MG chewable tablet aspirin 81 mg oral tablet,chewable chew 1 tablet (81 mg) by oral route once daily   Suspended     • atorvastatin (LIPITOR) 40 MG tablet      • azelastine (ASTELIN) 0.1 % nasal spray      • Calcium Carbonate-Vitamin D (Calcium-Vitamin D) 600-125 MG-UNIT tablet Calcium 600 + D(3) 600-125 mg-unit oral tablet take 1 tablet by oral route daily   Active     • Cholecalciferol 125 MCG (5000 UT) tablet Vitamin D3 125 mcg (5,000 unit) oral tablet take 1 tablet by oral route daily   Active     • famotidine (PEPCID) 20 MG tablet      • furosemide (LASIX) 20 MG tablet      • gabapentin (NEURONTIN) 300 MG capsule gabapentin 300 mg oral capsule take 1 capsule (300 mg) by oral route 1 times per day   Suspended     • metoprolol succinate XL (TOPROL-XL) 25 MG 24 hr tablet      • Multiple Vitamins-Minerals (PRESERVISION AREDS 2+MULTI VIT PO) PreserVision AREDS-2 090-686-93-1 mg-unit-mg-mg oral capsule take 1 capsule by oral route daily   Active     • multivitamin with minerals tablet tablet 1qd     • pantoprazole (PROTONIX) 20 MG EC tablet      • potassium chloride  (K-DUR,KLOR-CON) 10 MEQ CR tablet      • ticagrelor (BRILINTA) 90 MG tablet tablet Brilinta 90 mg oral tablet take 1 tablet (90 mg) by oral route 2 times per day   Suspended     • valsartan (DIOVAN) 40 MG tablet Diovan 40 mg oral tablet take 1 tablet by oral route daily   Active       No current facility-administered medications for this visit.     Review of Systems   Constitutional: Negative.    Skin:        Painful toenails.   All other systems reviewed and are negative.      OBJECTIVE     Vitals:    22 0912   BP: 168/67   Pulse: 67   Temp: 96.7 °F (35.9 °C)   SpO2: 96%       Patient seen in no apparent distress.      PHYSICAL EXAM:     Foot/Ankle Exam:       General:   Appearance: elderly    Appearance comment:  Chronically ill  Orientation: AAOx3    Affect: appropriate    Gait: unimpaired    Shoe Gear:  Casual shoes    VASCULAR      Right Foot Vascularity   Normal vascular exam    Dorsalis pedis:  2+  Posterior tibial:  2+  Skin Temperature: warm    Edema Gradin+ and pitting  CFT:  < 3 seconds  Pedal Hair Growth:  Absent  Varicosities: severe varicosities       Left Foot Vascularity   Normal vascular exam    Dorsalis pedis:  2+  Posterior tibial:  2+  Skin Temperature: warm    Edema Grading:  Pitting and 2+  CFT:  < 3 seconds  Pedal Hair Growth:  Absent  Varicosities: severe varicosities        NEUROLOGIC     Right Foot Neurologic   Normal sensation    Light touch sensation:  Normal  Vibratory sensation:  Normal  Hot/Cold sensation: normal    Protective Sensation using [place] Monofilament:  10     Left Foot Neurologic   Normal sensation    Light touch sensation:  Normal  Vibratory sensation:  Normal  Hot/cold sensation: normal    Protective Sensation using [place] Monofilament:  10     MUSCULOSKELETAL      Right Foot Musculoskeletal   Hammertoe:  Second toe and third toe  Hallux valgus: Yes       Left Foot Musculoskeletal   Hammertoe:  Second toe and third toe  Hallux valgus: Yes        MUSCLE STRENGTH     Right Foot Muscle Strength   Foot dorsiflexion:  4  Foot plantar flexion:  4  Foot inversion:  4  Foot eversion:  4     Left Foot Muscle Strength   Foot dorsiflexion:  4  Foot plantar flexion:  4  Foot inversion:  4  Foot eversion:  4     RANGE OF MOTION      Right Foot Range of Motion   Foot and ankle ROM within normal limits       Left Foot Range of Motion   Foot and ankle ROM within normal limits       DERMATOLOGIC     Right Foot Dermatologic   Skin: skin intact    Nails: onychomycosis, abnormally thick, subungual debris, dystrophic nails and ingrown toenail    Nails comment:  Toenails 1, 2, 3, 4, and 5     Left Foot Dermatologic   Skin: skin intact    Nails: onychomycosis, abnormally thick, subungual debris, dystrophic nails and ingrown toenail    Nails comment:  Toenails 1, 2, 3, 4, and 5      ASSESSMENT/PLAN     Diagnoses and all orders for this visit:    1. Valgus deformity of great toe, unspecified laterality (Primary)    2. Foot pain, bilateral    3. Valgus deformity of both great toes    4. Onychocryptosis    5. Hammer toes of both feet    6. Onychomycosis        Comprehensive lower extremity examination and evaluation was performed.    Discussed findings and treatment plan including risks, benefits, and treatment options with patient in detail. Patient agreed with treatment plan.    Nails 1 through 5 bilaterally were debrided in thickness and length and then smoothed with a Dremel Tool.  Tolerated the procedure well without complications.    An After Visit Summary was printed and given to the patient at discharge, including (if requested) any available informative/educational handouts regarding diagnosis, treatment, or medications. All questions were answered to patient/family satisfaction. Should symptoms fail to improve or worsen they agree to call or return to clinic or to go to the Emergency Department. Discussed the importance of following up with any needed screening  tests/labs/specialist appointments and any requested follow-up recommended by me today. Importance of maintaining follow-up discussed and patient accepts that missed appointments can delay diagnosis and potentially lead to worsening of conditions.    Return in about 9 weeks (around 9/19/2022) for Toenail Care., or sooner if acute issues arise.    This document has been electronically signed by Aidan Lundberg DPM on July 18, 2022 09:40 EDT

## 2022-08-28 ENCOUNTER — APPOINTMENT (OUTPATIENT)
Dept: GENERAL RADIOLOGY | Facility: HOSPITAL | Age: 87
End: 2022-08-28

## 2022-08-28 ENCOUNTER — HOSPITAL ENCOUNTER (EMERGENCY)
Facility: HOSPITAL | Age: 87
Discharge: HOME | End: 2022-08-29
Attending: EMERGENCY MEDICINE | Admitting: EMERGENCY MEDICINE

## 2022-08-28 DIAGNOSIS — R10.32 LEFT LOWER QUADRANT ABDOMINAL PAIN: ICD-10-CM

## 2022-08-28 DIAGNOSIS — N83.202 LEFT OVARIAN CYST: Primary | ICD-10-CM

## 2022-08-28 LAB
ALBUMIN SERPL-MCNC: 4.2 G/DL (ref 3.5–5.2)
ALBUMIN/GLOB SERPL: 1.4 G/DL
ALP SERPL-CCNC: 81 U/L (ref 39–117)
ALT SERPL W P-5'-P-CCNC: 19 U/L (ref 1–33)
ANION GAP SERPL CALCULATED.3IONS-SCNC: 10.6 MMOL/L (ref 5–15)
AST SERPL-CCNC: 32 U/L (ref 1–32)
BASOPHILS # BLD AUTO: 0.03 10*3/MM3 (ref 0–0.2)
BASOPHILS NFR BLD AUTO: 0.4 % (ref 0–1.5)
BILIRUB SERPL-MCNC: 0.7 MG/DL (ref 0–1.2)
BILIRUB UR QL STRIP: NEGATIVE
BUN SERPL-MCNC: 18 MG/DL (ref 8–23)
BUN/CREAT SERPL: 20.2 (ref 7–25)
CALCIUM SPEC-SCNC: 10.3 MG/DL (ref 8.2–9.6)
CHLORIDE SERPL-SCNC: 101 MMOL/L (ref 98–107)
CLARITY UR: CLEAR
CO2 SERPL-SCNC: 29.4 MMOL/L (ref 22–29)
COLOR UR: YELLOW
CREAT SERPL-MCNC: 0.89 MG/DL (ref 0.57–1)
DEPRECATED RDW RBC AUTO: 42.3 FL (ref 37–54)
EGFRCR SERPLBLD CKD-EPI 2021: 60.9 ML/MIN/1.73
EOSINOPHIL # BLD AUTO: 0.08 10*3/MM3 (ref 0–0.4)
EOSINOPHIL NFR BLD AUTO: 1.1 % (ref 0.3–6.2)
ERYTHROCYTE [DISTWIDTH] IN BLOOD BY AUTOMATED COUNT: 12.2 % (ref 12.3–15.4)
GLOBULIN UR ELPH-MCNC: 3 GM/DL
GLUCOSE SERPL-MCNC: 105 MG/DL (ref 65–99)
GLUCOSE UR STRIP-MCNC: NEGATIVE MG/DL
HCT VFR BLD AUTO: 38.9 % (ref 34–46.6)
HGB BLD-MCNC: 13.1 G/DL (ref 12–15.9)
HGB UR QL STRIP.AUTO: NEGATIVE
HOLD SPECIMEN: NORMAL
HOLD SPECIMEN: NORMAL
IMM GRANULOCYTES # BLD AUTO: 0.01 10*3/MM3 (ref 0–0.05)
IMM GRANULOCYTES NFR BLD AUTO: 0.1 % (ref 0–0.5)
KETONES UR QL STRIP: NEGATIVE
LEUKOCYTE ESTERASE UR QL STRIP.AUTO: NEGATIVE
LYMPHOCYTES # BLD AUTO: 3.57 10*3/MM3 (ref 0.7–3.1)
LYMPHOCYTES NFR BLD AUTO: 48.9 % (ref 19.6–45.3)
MAGNESIUM SERPL-MCNC: 2.2 MG/DL (ref 1.7–2.3)
MCH RBC QN AUTO: 31.4 PG (ref 26.6–33)
MCHC RBC AUTO-ENTMCNC: 33.7 G/DL (ref 31.5–35.7)
MCV RBC AUTO: 93.3 FL (ref 79–97)
MONOCYTES # BLD AUTO: 0.74 10*3/MM3 (ref 0.1–0.9)
MONOCYTES NFR BLD AUTO: 10.1 % (ref 5–12)
NEUTROPHILS NFR BLD AUTO: 2.87 10*3/MM3 (ref 1.7–7)
NEUTROPHILS NFR BLD AUTO: 39.4 % (ref 42.7–76)
NITRITE UR QL STRIP: NEGATIVE
NRBC BLD AUTO-RTO: 0 /100 WBC (ref 0–0.2)
PH UR STRIP.AUTO: 7 [PH] (ref 5–8)
PLATELET # BLD AUTO: 184 10*3/MM3 (ref 140–450)
PMV BLD AUTO: 9.7 FL (ref 6–12)
POTASSIUM SERPL-SCNC: 4.1 MMOL/L (ref 3.5–5.2)
PROT SERPL-MCNC: 7.2 G/DL (ref 6–8.5)
PROT UR QL STRIP: NEGATIVE
RBC # BLD AUTO: 4.17 10*6/MM3 (ref 3.77–5.28)
SODIUM SERPL-SCNC: 141 MMOL/L (ref 136–145)
SP GR UR STRIP: <=1.005 (ref 1–1.03)
TROPONIN T SERPL-MCNC: <0.01 NG/ML (ref 0–0.03)
UROBILINOGEN UR QL STRIP: NORMAL
WBC NRBC COR # BLD: 7.3 10*3/MM3 (ref 3.4–10.8)
WHOLE BLOOD HOLD COAG: NORMAL
WHOLE BLOOD HOLD SPECIMEN: NORMAL

## 2022-08-28 PROCEDURE — 85025 COMPLETE CBC W/AUTO DIFF WBC: CPT | Performed by: EMERGENCY MEDICINE

## 2022-08-28 PROCEDURE — 93010 ELECTROCARDIOGRAM REPORT: CPT | Performed by: INTERNAL MEDICINE

## 2022-08-28 PROCEDURE — 99283 EMERGENCY DEPT VISIT LOW MDM: CPT

## 2022-08-28 PROCEDURE — 93005 ELECTROCARDIOGRAM TRACING: CPT

## 2022-08-28 PROCEDURE — 83735 ASSAY OF MAGNESIUM: CPT | Performed by: EMERGENCY MEDICINE

## 2022-08-28 PROCEDURE — 81003 URINALYSIS AUTO W/O SCOPE: CPT | Performed by: EMERGENCY MEDICINE

## 2022-08-28 PROCEDURE — 84484 ASSAY OF TROPONIN QUANT: CPT | Performed by: EMERGENCY MEDICINE

## 2022-08-28 PROCEDURE — 71045 X-RAY EXAM CHEST 1 VIEW: CPT

## 2022-08-28 PROCEDURE — 36415 COLL VENOUS BLD VENIPUNCTURE: CPT | Performed by: EMERGENCY MEDICINE

## 2022-08-28 PROCEDURE — 93005 ELECTROCARDIOGRAM TRACING: CPT | Performed by: EMERGENCY MEDICINE

## 2022-08-28 PROCEDURE — 80053 COMPREHEN METABOLIC PANEL: CPT | Performed by: EMERGENCY MEDICINE

## 2022-08-28 RX ORDER — KETOROLAC TROMETHAMINE 15 MG/ML
15 INJECTION, SOLUTION INTRAMUSCULAR; INTRAVENOUS ONCE
Status: COMPLETED | OUTPATIENT
Start: 2022-08-29 | End: 2022-08-29

## 2022-08-28 RX ORDER — ONDANSETRON 2 MG/ML
4 INJECTION INTRAMUSCULAR; INTRAVENOUS ONCE
Status: COMPLETED | OUTPATIENT
Start: 2022-08-29 | End: 2022-08-29

## 2022-08-28 RX ORDER — SODIUM CHLORIDE 0.9 % (FLUSH) 0.9 %
10 SYRINGE (ML) INJECTION AS NEEDED
Status: DISCONTINUED | OUTPATIENT
Start: 2022-08-28 | End: 2022-08-29 | Stop reason: HOSPADM

## 2022-08-29 ENCOUNTER — TELEPHONE (OUTPATIENT)
Dept: OBSTETRICS AND GYNECOLOGY | Facility: CLINIC | Age: 87
End: 2022-08-29

## 2022-08-29 ENCOUNTER — APPOINTMENT (OUTPATIENT)
Dept: CT IMAGING | Facility: HOSPITAL | Age: 87
End: 2022-08-29

## 2022-08-29 VITALS
HEART RATE: 63 BPM | HEIGHT: 67 IN | SYSTOLIC BLOOD PRESSURE: 129 MMHG | RESPIRATION RATE: 18 BRPM | TEMPERATURE: 97.9 F | OXYGEN SATURATION: 93 % | DIASTOLIC BLOOD PRESSURE: 56 MMHG | BODY MASS INDEX: 27.68 KG/M2 | WEIGHT: 176.37 LBS

## 2022-08-29 LAB — GLUCOSE BLDC GLUCOMTR-MCNC: 96 MG/DL (ref 70–99)

## 2022-08-29 PROCEDURE — 25010000002 KETOROLAC TROMETHAMINE PER 15 MG: Performed by: NURSE PRACTITIONER

## 2022-08-29 PROCEDURE — 0 IOPAMIDOL PER 1 ML: Performed by: EMERGENCY MEDICINE

## 2022-08-29 PROCEDURE — 82962 GLUCOSE BLOOD TEST: CPT

## 2022-08-29 PROCEDURE — 74177 CT ABD & PELVIS W/CONTRAST: CPT

## 2022-08-29 PROCEDURE — 96374 THER/PROPH/DIAG INJ IV PUSH: CPT

## 2022-08-29 PROCEDURE — 96375 TX/PRO/DX INJ NEW DRUG ADDON: CPT

## 2022-08-29 PROCEDURE — 25010000002 ONDANSETRON PER 1 MG: Performed by: NURSE PRACTITIONER

## 2022-08-29 RX ADMIN — SODIUM CHLORIDE 500 ML: 9 INJECTION, SOLUTION INTRAVENOUS at 01:42

## 2022-08-29 RX ADMIN — IOPAMIDOL 100 ML: 755 INJECTION, SOLUTION INTRAVENOUS at 01:07

## 2022-08-29 RX ADMIN — ONDANSETRON 4 MG: 2 INJECTION INTRAMUSCULAR; INTRAVENOUS at 01:47

## 2022-08-29 RX ADMIN — KETOROLAC TROMETHAMINE 15 MG: 15 INJECTION, SOLUTION INTRAMUSCULAR; INTRAVENOUS at 01:50

## 2022-08-29 NOTE — TELEPHONE ENCOUNTER
There is a small 2.3 cm left ovarian cyst that the radiologist is saying they saw on a previous CT scans in 2010.  I do not think that she needs to be seen for this.  She is 89 years old and this cyst is unchanged.  We stop doing Pap smears at the age of 65-70.  I do not see that any of this is urgent.  If the niece is wanting her to be seen were happy to see her but there is no urgency for have gynecologic care based on the CT scan.

## 2022-08-29 NOTE — TELEPHONE ENCOUNTER
Caller: NORA PEÑA     Relationship to patient: MALACHI     Best call back number: [phone]    Chief complaint: PT WAS IN ED ( [facility]) LAST NIGHT(08.28.22) DX LEFT OVARIAN CYST     Type of visit: NEW GYN     Requested date: ASAP  Additional notes:PT MALACHI CALLED IN AND STATED THAT THE PT HAS NEVER HAD A PAP SMEAR.  SHE HAS NEVER HAD CHILDREN.  SHE WAS DX WITH LEFT OVARIAN CYST AT THE ED LAST NIGHT.  PT MALACHI  IS TRYING TO SCHEDULE AN APPT FOR A FOLLOW UP FROM ED.  SHE WOULD PREFER A Tuesday OR Thursday APPT DUE TO TRANSPORTATION.  AND A FEMALE PROVIDER

## 2022-09-03 LAB — QT INTERVAL: 440 MS

## 2022-10-18 ENCOUNTER — OFFICE VISIT (OUTPATIENT)
Dept: PODIATRY | Facility: CLINIC | Age: 87
End: 2022-10-18

## 2022-10-18 VITALS
OXYGEN SATURATION: 97 % | BODY MASS INDEX: 27.78 KG/M2 | HEIGHT: 67 IN | HEART RATE: 68 BPM | WEIGHT: 177 LBS | DIASTOLIC BLOOD PRESSURE: 55 MMHG | TEMPERATURE: 97.5 F | SYSTOLIC BLOOD PRESSURE: 138 MMHG

## 2022-10-18 DIAGNOSIS — M20.42 HAMMER TOES OF BOTH FEET: ICD-10-CM

## 2022-10-18 DIAGNOSIS — M20.10 VALGUS DEFORMITY OF GREAT TOE, UNSPECIFIED LATERALITY: Primary | ICD-10-CM

## 2022-10-18 DIAGNOSIS — L60.0 ONYCHOCRYPTOSIS: ICD-10-CM

## 2022-10-18 DIAGNOSIS — M20.41 HAMMER TOES OF BOTH FEET: ICD-10-CM

## 2022-10-18 DIAGNOSIS — M79.672 FOOT PAIN, BILATERAL: ICD-10-CM

## 2022-10-18 DIAGNOSIS — M20.11 VALGUS DEFORMITY OF BOTH GREAT TOES: ICD-10-CM

## 2022-10-18 DIAGNOSIS — M20.12 VALGUS DEFORMITY OF BOTH GREAT TOES: ICD-10-CM

## 2022-10-18 DIAGNOSIS — M79.671 FOOT PAIN, BILATERAL: ICD-10-CM

## 2022-10-18 DIAGNOSIS — B35.1 ONYCHOMYCOSIS: ICD-10-CM

## 2022-10-18 PROCEDURE — 11721 DEBRIDE NAIL 6 OR MORE: CPT | Performed by: PODIATRIST

## 2022-10-18 NOTE — PROGRESS NOTES
[facility]    Today's Date: 10/18/22    Patient Name: Megha Hardy  MRN: [id number]  CSN: [id number]  PCP: Loretta Berry MD, Last PCP Visit: 10/18/2022  Referring Provider: No ref. provider found    SUBJECTIVE     Chief Complaint   Patient presents with   • Left Foot - Follow-up, Nail Problem   • Right Foot - Follow-up, Nail Problem     HPI: Megha Hardy, a 89 y.o.female, comes to clinic.    New, Established, New Problem:  established   Location:  Toenails  Duration:   Greater than five years  Onset:  Gradual  Nature:  sore with palpation.  Stable, worsening, improving:   Stable  Aggravating factors:  Pain with shoe gear and ambulation.  Previous Treatment:  debridement    Medical changes:  URI.    Patient denies any fevers, chills, nausea, vomiting, shortness of breathe, nor any other constitutional signs nor symptoms.       Past Medical History:   Diagnosis Date   • Ankle pain    • Arthritis    • Bladder disorder    • Broken shoulder 1960   • Bronchitis    • Bunion    • Cancer, skin, squamous cell    • Cerumen impaction    • Condition not found     Ulcer   • Corns and callus    • Foot cramps    • Foot pain, bilateral    • Hallux valgus of left foot 03/15/2018   • Hallux valgus of right foot 03/15/2018   • Hammer toe    • Head injury 1960    head injury from falling off back of truck   • Hearing loss    • Heart disease    • High cholesterol    • History of kidney problems    • History of total knee arthroplasty, left 05/07/2018 11/03/2018   • Hyperlipidemia    • Hypertension    • Ingrowing toenail    • Kidney disease    • Limb swelling    • Nausea    • Numbness in feet    • Obesity    • Primary osteoarthritis of left knee 09/11/2017   • Shortness of breath    • Sinus congestion    • Tinea unguium      Past Surgical History:   Procedure Laterality Date   • CORONARY ANGIOPLASTY WITH STENT PLACEMENT  2006   • OTHER SURGICAL HISTORY      Artificial Joints/Limbs   • SHOULDER  SURGERY Left 1963   • SKIN CANCER EXCISION     • TOTAL KNEE ARTHROPLASTY Left 1983   • TOTAL KNEE ARTHROPLASTY Right 2004     Family History   Problem Relation Age of Onset   • Arthritis Mother    • Stroke Mother    • Heart disease Mother    • Diabetes Mother         Mellitus   • Arthritis Father    • Heart disease Father    • Colon cancer Brother 50     Social History     Socioeconomic History   • Marital status:    Tobacco Use   • Smoking status: Never   • Smokeless tobacco: Never   Vaping Use   • Vaping Use: Never used   Substance and Sexual Activity   • Alcohol use: Never   • Drug use: Never   • Sexual activity: Defer     Allergies   Allergen Reactions   • Sulfa Antibiotics Rash     Current Outpatient Medications   Medication Sig Dispense Refill   • amLODIPine (NORVASC) 10 MG tablet      • amLODIPine (NORVASC) 2.5 MG tablet      • aspirin 81 MG chewable tablet aspirin 81 mg oral tablet,chewable chew 1 tablet (81 mg) by oral route once daily   Suspended     • atorvastatin (LIPITOR) 40 MG tablet      • azelastine (ASTELIN) 0.1 % nasal spray      • Calcium Carbonate-Vitamin D (Calcium-Vitamin D) 600-125 MG-UNIT tablet Calcium 600 + D(3) 600-125 mg-unit oral tablet take 1 tablet by oral route daily   Active     • Cholecalciferol 125 MCG (5000 UT) tablet Vitamin D3 125 mcg (5,000 unit) oral tablet take 1 tablet by oral route daily   Active     • Diclofenac Sodium (VOLTAREN) 1 % gel gel Apply 4 g topically to the appropriate area as directed 4 (Four) Times a Day As Needed (pain and swelling). 150 g 0   • famotidine (PEPCID) 20 MG tablet      • furosemide (LASIX) 20 MG tablet 2 (Two) Times a Day.     • gabapentin (NEURONTIN) 300 MG capsule gabapentin 300 mg oral capsule take 1 capsule (300 mg) by oral route 1 times per day   Suspended     • metoprolol succinate XL (TOPROL-XL) 25 MG 24 hr tablet      • Multiple Vitamins-Minerals (PRESERVISION AREDS 2+MULTI VIT PO) PreserVision AREDS-2 061-597-95-1 mg-unit-mg-mg  oral capsule take 1 capsule by oral route daily   Active     • multivitamin with minerals tablet tablet 1qd     • pantoprazole (PROTONIX) 20 MG EC tablet      • potassium chloride (K-DUR,KLOR-CON) 10 MEQ CR tablet 2 (Two) Times a Day.     • ticagrelor (BRILINTA) 90 MG tablet tablet Brilinta 90 mg oral tablet take 1 tablet (90 mg) by oral route 2 times per day   Suspended     • valsartan (DIOVAN) 40 MG tablet Diovan 40 mg oral tablet take 1 tablet by oral route daily   Active       No current facility-administered medications for this visit.     Review of Systems   Constitutional: Negative.    Skin:        Painful toenails.   All other systems reviewed and are negative.      OBJECTIVE     Vitals:    10/18/22 0922   BP: 138/55   Pulse: 68   Temp: 97.5 °F (36.4 °C)   SpO2: 97%       Patient seen in no apparent distress.      PHYSICAL EXAM:     Foot/Ankle Exam:       General:   Appearance: elderly    Appearance comment:  Chronically ill  Orientation: AAOx3    Affect: appropriate    Gait: unimpaired    Shoe Gear:  Casual shoes    VASCULAR      Right Foot Vascularity   Normal vascular exam    Dorsalis pedis:  2+  Posterior tibial:  2+  Skin Temperature: warm    Edema Gradin+ and pitting  CFT:  < 3 seconds  Pedal Hair Growth:  Absent  Varicosities: severe varicosities       Left Foot Vascularity   Normal vascular exam    Dorsalis pedis:  2+  Posterior tibial:  2+  Skin Temperature: warm    Edema Grading:  Pitting and 2+  CFT:  < 3 seconds  Pedal Hair Growth:  Absent  Varicosities: severe varicosities        NEUROLOGIC     Right Foot Neurologic   Normal sensation    Light touch sensation:  Normal  Vibratory sensation:  Normal  Hot/Cold sensation: normal    Protective Sensation using [place] Monofilament:  10     Left Foot Neurologic   Normal sensation    Light touch sensation:  Normal  Vibratory sensation:  Normal  Hot/cold sensation: normal    Protective Sensation using [place] Monofilament:  10      MUSCULOSKELETAL      Right Foot Musculoskeletal   Hammertoe:  Second toe and third toe  Hallux valgus: Yes       Left Foot Musculoskeletal   Hammertoe:  Second toe and third toe  Hallux valgus: Yes       MUSCLE STRENGTH     Right Foot Muscle Strength   Foot dorsiflexion:  4  Foot plantar flexion:  4  Foot inversion:  4  Foot eversion:  4     Left Foot Muscle Strength   Foot dorsiflexion:  4  Foot plantar flexion:  4  Foot inversion:  4  Foot eversion:  4     RANGE OF MOTION      Right Foot Range of Motion   Foot and ankle ROM within normal limits       Left Foot Range of Motion   Foot and ankle ROM within normal limits       DERMATOLOGIC     Right Foot Dermatologic   Skin: skin intact    Nails: onychomycosis, abnormally thick, subungual debris, dystrophic nails and ingrown toenail    Nails comment:  Toenails 1, 2, 3, 4, and 5     Left Foot Dermatologic   Skin: skin intact    Nails: onychomycosis, abnormally thick, subungual debris, dystrophic nails and ingrown toenail    Nails comment:  Toenails 1, 2, 3, 4, and 5      ASSESSMENT/PLAN     Diagnoses and all orders for this visit:    1. Valgus deformity of great toe, unspecified laterality (Primary)    2. Onychocryptosis    3. Foot pain, bilateral    4. Hammer toes of both feet    5. Valgus deformity of both great toes    6. Onychomycosis        Comprehensive lower extremity examination and evaluation was performed.    Discussed findings and treatment plan including risks, benefits, and treatment options with patient in detail. Patient agreed with treatment plan.    Nails 1 through 5 bilaterally were debrided in thickness and length and then smoothed with a Dremel Tool.  Tolerated the procedure well without complications.    An After Visit Summary was printed and given to the patient at discharge, including (if requested) any available informative/educational handouts regarding diagnosis, treatment, or medications. All questions were answered to patient/family  satisfaction. Should symptoms fail to improve or worsen they agree to call or return to clinic or to go to the Emergency Department. Discussed the importance of following up with any needed screening tests/labs/specialist appointments and any requested follow-up recommended by me today. Importance of maintaining follow-up discussed and patient accepts that missed appointments can delay diagnosis and potentially lead to worsening of conditions.    Return in about 9 weeks (around 12/20/2022) for Toenail Care., or sooner if acute issues arise.    This document has been electronically signed by Aidan Lundberg DPM on October 18, 2022 09:43 EDT

## 2023-01-17 NOTE — PROGRESS NOTES
Progress Note      Patient Name: Megha Hardy   Patient ID: [id number]   Sex: Female   YOB: 1930    Primary Care Provider: Vasquez Berry   Referring Provider: Aidan Lundberg DPM    Visit Date: March 2, 2021    Provider: Aidan Lundberg DPM   Location: [facility]   Location Address: [address] [place] [address]  [id number]   Location Phone: [phone]          Chief Complaint  · Routine Foot Care Visit      History Of Present Illness  Megha Hardy complains of painful, elongated toenails which are thickened, yellowed, chalky, and cause pain with shoe gear and ambulation.      New, Established, New Problem:  Established   Location:  Toenails  Duration:   Greater than five years  Onset:  Gradual  Nature:  Sore with palpation.  Stable, worsening, improving:   stable  Aggravating factors:  Pain with shoe gear and ambulation.  Previous Treatment:  Debridement    Patient denies any fevers, chills, nausea, vomiting, shortness of breathe, nor any other constitutional signs nor symptoms.    Patient relates no medical changes since their last visit.       Past Medical History  Ankle pain; Arthritis; Bladder disorder; Broken shoulder; Bronchitis; Bunion; Cancer, skin, squamous cell; Corns and callus; Foot cramps; Foot pain, left; Foot pain, right; Hallux valgus of left foot; Hallux valgus of right foot; Hammer toe; Head injury; Heart Disease; High blood pressure; High cholesterol; History of total knee arthroplasty, left, 05/07/2018; Hyperlipidemia; Hypertension; Ingrowing toenail; Kidney Disease; Kidney problem; Limb Swelling; Nausea; Numbness in feet; Obesity; Primary osteoarthritis of left knee; Shortness of breath; Shortness of breath; Sinus congestion; Tinea unguium; Ulcer         Past Surgical History  Artificial Joints/Limbs; cardiac stents; Knee replacement, left; Knee replacement, right; shoulder repair; skin cancer removed         Medication List  amlodipine 5 mg oral  "tablet; atorvastatin 20 mg oral tablet; Calcium 600 + D(3) 600-125 mg-unit oral tablet; metoprolol succinate 50 mg oral tablet extended release 24 hr; multivitamin oral tablet         Allergy List  SULFA (SULFONAMIDES)       Allergies Reconciled  Family Medical History  Stroke; Heart Disease; Cancer, Unspecified; Diabetes, unspecified type; Colon Cancer; Diabetes; Family history of Arthritis         Reproductive History   0 Para 0 0 0 0       Social History  Alcohol (Never); Alcohol Use (Never); lives alone; Recreational Drug Use (Never); Retired; Retired.; Tobacco (Never);          Immunizations  Name Date Admin   Influenza 10/09/2015         Review of Systems  · Constitutional  o Denies  o : fever, chills  · Eyes  o Denies  o : double vision  · HENT  o Denies  o : vertigo, recent head injury, hearing loss or ringing  · Cardiovascular  o Denies  o : chest pain, irregular heart beats  · Respiratory  o Denies  o : shortness of breath, productive cough  · Gastrointestinal  o Denies  o : nausea, vomiting  · Integument  o * See HPI  · Neurologic  o Denies  o : altered mental status, tingling or numbness, seizures  · Musculoskeletal  o Denies  o : joint pain, joint swelling, limitation of motion      Vitals  Date Time BP Position Site L\R Cuff Size HR RR TEMP (F) WT  HT  BMI kg/m2 BSA m2 O2 Sat FR L/min FiO2 HC       2021 10:27 /79 Sitting    68 - R  97.6 193lbs 16oz 5'  7\" 30.38 2.04 98 %      2021 10:27 /72 Sitting                       Physical Examination  · Constitutional  o Appearance  o : overweight, well developed, no obvious deformities present, appears to be chronically ill   · Eyes  o Vision  o : Patient wearing glasses.   · Respiratory  o Respiratory Effort  o : No labored breathing. Good respiratory effort.   · Cardiovascular  o Peripheral Vascular System  o :   § Pedal Pulses  § : Pedal Pulses are 2+ and symmetrical  § Extremities  § : 1+ edema present, no cyanosis, no " distal hair loss, normal capillary refill  · Musculoskeletal  o Extremeties/Joint  o : Lower extremity muscle strength and range of motion is equal and symmetrical bilaterally. The knees are noted to be in normal alignment. Ankle alignment and range of motion is normal and foot structure is normal.  · Skin and Subcutaneous Tissue  o General Inspection  o : no lesions present, no areas of discoloration, skin turgor normal, texture normal  · Neurologic  o Sensation  o : Sharp/dull sensation is within normal limits. [place] 5.07 monofilament intact to all assessed areas.   · Toes  o Toes: Right Foot  o :   § Inspection  § : Medial deviation of the first metatarsal with associated lateral deviation of the hallux at the metatarsal phalangeal joint.   § Toenails  § : Toenails are hypertrophic, mycotc, dystrophic, thickened, with sublingual debris, incurvated, brittle toenail(s) at nail-1, 2, 3,, Oncholysis of the foot   o Toes: Left Foot  o :   § Inspection  § : Medial deviation of the first metatarsal with associated lateral deviation of the hallux at the metatarsal phalangeal joint.   § Toenails  § : Toenails are hypertrophic, mycotc, dystrophic, thickened, with sublingual debris, incurvated, brittle toenail(s) at nail-1, 2, 3,, Onycholysis of the foot   · Procedures  o Nail Debridement  o : Nail debridement is indicated for the following toenails:-left hallux, left 2nd toe, left 3rd toe, right hallux, right 2nd toe, right 3rd toe,, The nail was debrided of excessive thickness to appropriate levels of comfort and contour using nail nippers. The procedure was without complications          Assessment  · Foot pain, left     729.5/M79.672  · Foot pain, right     729.5/M79.671  · Ingrowing nail     703.0/L60.0  · Tinea unguium     110.1/B35.1  · Hallux valgus of left foot     735.0/M20.12  · Hallux valgus of right foot     735.0/M20.11  · Ingrowing toenail     703.0/L60.0      Plan  · Orders  o Debridement of six  or more nails (00947, 85162, 27862, 28615, 48086, 90041, 04089, 10708, 30787, 40927) - 703.0/L60.0, 729.5/M79.671, 729.5/M79.672, 110.1/B35.1 - 03/02/2021  o ACO-16: BMI above normal range and follow-up plan is documented (, , , , , , , , , ) - - 03/02/2021  · Medications  o Medications have been Reconciled  o Transition of Care or Provider Policy  · Instructions  o I have discussed the findings of this evaluation with the patient. The discussion included a complete verbal explanation of any changes in the examination results, diagnosis, and the current treatment plan. A schedule for future care needs was explained. If any questions should arise after returning home, I have encouraged the patient to feel free to contact Dr. Lundberg. The patient states understanding and agreement with this plan.   o Patient is to monitor for problems and to contact Dr. Lundberg for follow-up should such signs occur. Patient states understanding and agreement with this plan.   o BMI Counseling: Discussed weight loss and the need for exercise.   o Follow up in 9 weeks for Routine Foot Care.   o Encouraged to follow-up with Primary Care Provider for preventative care.   o Electronically Identified Patient Education Materials Provided Electronically  · Disposition  o Call or Return if symptoms worsen or persist.            Electronically Signed by: Aidan Lundberg DPM -Author on March 2, 2021 10:34:47 AM   show

## 2023-01-23 ENCOUNTER — OFFICE VISIT (OUTPATIENT)
Dept: PODIATRY | Facility: CLINIC | Age: 88
End: 2023-01-23
Payer: MEDICARE

## 2023-01-23 VITALS
TEMPERATURE: 97.1 F | OXYGEN SATURATION: 97 % | HEART RATE: 63 BPM | BODY MASS INDEX: 27.15 KG/M2 | DIASTOLIC BLOOD PRESSURE: 67 MMHG | WEIGHT: 173 LBS | SYSTOLIC BLOOD PRESSURE: 172 MMHG | HEIGHT: 67 IN

## 2023-01-23 DIAGNOSIS — M20.42 HAMMER TOES OF BOTH FEET: ICD-10-CM

## 2023-01-23 DIAGNOSIS — M79.672 FOOT PAIN, BILATERAL: ICD-10-CM

## 2023-01-23 DIAGNOSIS — B35.1 ONYCHOMYCOSIS: ICD-10-CM

## 2023-01-23 DIAGNOSIS — M20.11 VALGUS DEFORMITY OF BOTH GREAT TOES: ICD-10-CM

## 2023-01-23 DIAGNOSIS — M79.671 FOOT PAIN, BILATERAL: ICD-10-CM

## 2023-01-23 DIAGNOSIS — M20.41 HAMMER TOES OF BOTH FEET: ICD-10-CM

## 2023-01-23 DIAGNOSIS — M20.12 VALGUS DEFORMITY OF BOTH GREAT TOES: ICD-10-CM

## 2023-01-23 DIAGNOSIS — L60.0 ONYCHOCRYPTOSIS: ICD-10-CM

## 2023-01-23 DIAGNOSIS — M20.10 VALGUS DEFORMITY OF GREAT TOE, UNSPECIFIED LATERALITY: Primary | ICD-10-CM

## 2023-01-23 PROCEDURE — 11721 DEBRIDE NAIL 6 OR MORE: CPT | Performed by: PODIATRIST

## 2023-01-23 RX ORDER — FUROSEMIDE 40 MG/1
TABLET ORAL
COMMUNITY
Start: 2022-12-06

## 2023-01-23 NOTE — PROGRESS NOTES
[facility]    Today's Date: 01/23/23    Patient Name: Megha Hardy  MRN: [id number]  CSN: [id number]  PCP: Loretta Berry MD, Last PCP Visit: 10/18/2022  Referring Provider: No ref. provider found    SUBJECTIVE     Chief Complaint   Patient presents with   • Right Foot - Follow-up, Nail Problem   • Left Foot - Follow-up, Nail Problem     HPI: Megha Hardy, a 89 y.o.female, comes to clinic.    New, Established, New Problem:  established   Location:  Toenails  Duration:   Greater than five years  Onset:  Gradual  Nature:  sore with palpation.  Stable, worsening, improving:   Stable  Aggravating factors:  Pain with shoe gear and ambulation.  Previous Treatment:  debridement    Medical changes:  Flu, no hospital admission    Patient denies any fevers, chills, nausea, vomiting, shortness of breathe, nor any other constitutional signs nor symptoms.       Past Medical History:   Diagnosis Date   • Ankle pain    • Arthritis    • Bladder disorder    • Broken shoulder 1960   • Bronchitis    • Bunion    • Cancer, skin, squamous cell    • Cerumen impaction    • CHF (congestive heart failure) (HCC)    • Condition not found     Ulcer   • Corns and callus    • Foot cramps    • Foot pain, bilateral    • Hallux valgus of left foot 03/15/2018   • Hallux valgus of right foot 03/15/2018   • Hammer toe    • Head injury 1960    head injury from falling off back of truck   • Hearing loss    • Heart disease    • High cholesterol    • History of kidney problems    • History of total knee arthroplasty, left 05/07/2018 11/03/2018   • Hyperlipidemia    • Hypertension    • Ingrowing toenail    • Kidney disease    • Limb swelling    • Nausea    • Numbness in feet    • Obesity    • Primary osteoarthritis of left knee 09/11/2017   • Shortness of breath    • Sinus congestion    • Tinea unguium      Past Surgical History:   Procedure Laterality Date   • CORONARY ANGIOPLASTY WITH STENT PLACEMENT  2006   • OTHER  SURGICAL HISTORY      Artificial Joints/Limbs   • SHOULDER SURGERY Left 1963   • SKIN CANCER EXCISION     • TOTAL KNEE ARTHROPLASTY Left 1983   • TOTAL KNEE ARTHROPLASTY Right 2004     Family History   Problem Relation Age of Onset   • Arthritis Mother    • Stroke Mother    • Heart disease Mother    • Diabetes Mother         Mellitus   • Arthritis Father    • Heart disease Father    • Colon cancer Brother 50     Social History     Socioeconomic History   • Marital status:    Tobacco Use   • Smoking status: Never     Passive exposure: Never   • Smokeless tobacco: Never   Vaping Use   • Vaping Use: Never used   Substance and Sexual Activity   • Alcohol use: Never   • Drug use: Never   • Sexual activity: Defer     Allergies   Allergen Reactions   • Sulfa Antibiotics Rash     Current Outpatient Medications   Medication Sig Dispense Refill   • amLODIPine (NORVASC) 10 MG tablet      • amLODIPine (NORVASC) 2.5 MG tablet      • aspirin 81 MG chewable tablet aspirin 81 mg oral tablet,chewable chew 1 tablet (81 mg) by oral route once daily   Suspended     • atorvastatin (LIPITOR) 40 MG tablet      • azelastine (ASTELIN) 0.1 % nasal spray      • Calcium Carbonate-Vitamin D (Calcium-Vitamin D) 600-125 MG-UNIT tablet Calcium 600 + D(3) 600-125 mg-unit oral tablet take 1 tablet by oral route daily   Active     • Cholecalciferol 125 MCG (5000 UT) tablet Vitamin D3 125 mcg (5,000 unit) oral tablet take 1 tablet by oral route daily   Active     • Diclofenac Sodium (VOLTAREN) 1 % gel gel Apply 4 g topically to the appropriate area as directed 4 (Four) Times a Day As Needed (pain and swelling). 150 g 0   • famotidine (PEPCID) 20 MG tablet      • furosemide (LASIX) 40 MG tablet 1qd     • gabapentin (NEURONTIN) 300 MG capsule gabapentin 300 mg oral capsule take 1 capsule (300 mg) by oral route 1 times per day   Suspended     • metoprolol succinate XL (TOPROL-XL) 25 MG 24 hr tablet      • Multiple Vitamins-Minerals (PRESERVISION  AREDS 2+MULTI VIT PO) PreserVision AREDS-2 [phone] mg-unit-mg-mg oral capsule take 1 capsule by oral route daily   Active     • multivitamin with minerals tablet tablet 1qd     • oseltamivir (Tamiflu) 75 MG capsule Take 1 capsule by mouth 2 (Two) Times a Day. 10 capsule 0   • pantoprazole (PROTONIX) 20 MG EC tablet      • potassium chloride (K-DUR,KLOR-CON) 10 MEQ CR tablet 2 (Two) Times a Day.     • ticagrelor (BRILINTA) 90 MG tablet tablet Brilinta 90 mg oral tablet take 1 tablet (90 mg) by oral route 2 times per day   Suspended     • valsartan (DIOVAN) 40 MG tablet Diovan 40 mg oral tablet take 1 tablet by oral route daily   Active       No current facility-administered medications for this visit.     Review of Systems   Constitutional: Negative.    Skin:        Painful toenails.   All other systems reviewed and are negative.      OBJECTIVE     Vitals:    23 1030   BP: 172/67   Pulse: 63   Temp: 97.1 °F (36.2 °C)   SpO2: 97%       Patient seen in no apparent distress.      PHYSICAL EXAM:     Foot/Ankle Exam:       General:   Appearance: elderly    Appearance comment:  Chronically ill  Orientation: AAOx3    Affect: appropriate    Assistance: cane    Shoe Gear:  Casual shoes    VASCULAR      Right Foot Vascularity   Normal vascular exam    Dorsalis pedis:  2+  Posterior tibial:  2+  Skin Temperature: warm    Edema Gradin+ and pitting  CFT:  < 3 seconds  Pedal Hair Growth:  Absent  Varicosities: severe varicosities       Left Foot Vascularity   Normal vascular exam    Dorsalis pedis:  2+  Posterior tibial:  2+  Skin Temperature: warm    Edema Grading:  Pitting and 2+  CFT:  < 3 seconds  Pedal Hair Growth:  Absent  Varicosities: severe varicosities        NEUROLOGIC     Right Foot Neurologic   Normal sensation    Light touch sensation:  Normal  Vibratory sensation:  Normal  Hot/Cold sensation: normal    Protective Sensation using [place] Monofilament:  10     Left Foot Neurologic   Normal  sensation    Light touch sensation:  Normal  Vibratory sensation:  Normal  Hot/cold sensation: normal    Protective Sensation using [place] Monofilament:  10     MUSCULOSKELETAL      Right Foot Musculoskeletal   [place]amm[place]oe:  Second toe and third toe  Hallux valgus: Yes       Left Foot Musculoskeletal   [place]amm[place]oe:  Second toe and third toe  Hallux valgus: Yes       MUSCLE STRENGTH     Right Foot Muscle Strength   Foot dorsiflexion:  4  Foot plantar flexion:  4  Foot inversion:  4  Foot eversion:  4     Left Foot Muscle Strength   Foot dorsiflexion:  4  Foot plantar flexion:  4  Foot inversion:  4  Foot eversion:  4     RANGE OF MOTION      Right Foot Range of Motion   Foot and ankle ROM within normal limits       Left Foot Range of Motion   Foot and ankle ROM within normal limits       DERMATOLOGIC     Right Foot Dermatologic   Skin: skin intact    Nails: onychomycosis, abnormally thick, subungual debris, dystrophic nails and ingrown toenail    Nails comment:  Toenails 1, 2, 3, 4, and 5     Left Foot Dermatologic   Skin: skin intact    Nails: onychomycosis, abnormally thick, subungual debris, dystrophic nails and ingrown toenail    Nails comment:  Toenails 1, 2, 3, 4, and 5      ASSESSMENT/PLAN     Diagnoses and all orders for this visit:    1. Valgus deformity of great toe, unspecified laterality (Primary)    2. Hammer toes of both feet    3. Onychocryptosis    4. Valgus deformity of both great toes    5. Foot pain, bilateral    6. Onychomycosis        Comprehensive lower extremity examination and evaluation was performed.    Discussed findings and treatment plan including risks, benefits, and treatment options with patient in detail. Patient agreed with treatment plan.    Nails 1 through 5 bilaterally were debrided in thickness and length and then smoothed with a Dremel Tool.  Tolerated the procedure well without complications.    An After Visit Summary was printed and given to the patient at discharge,  including (if requested) any available informative/educational handouts regarding diagnosis, treatment, or medications. All questions were answered to patient/family satisfaction. Should symptoms fail to improve or worsen they agree to call or return to clinic or to go to the Emergency Department. Discussed the importance of following up with any needed screening tests/labs/specialist appointments and any requested follow-up recommended by me today. Importance of maintaining follow-up discussed and patient accepts that missed appointments can delay diagnosis and potentially lead to worsening of conditions.    Return in about 9 weeks (around 3/27/2023) for Toenail Care., or sooner if acute issues arise.    This document has been electronically signed by Aidan Lundberg DPM on January 23, 2023 10:46 EST

## 2023-04-25 ENCOUNTER — OFFICE VISIT (OUTPATIENT)
Dept: PODIATRY | Facility: CLINIC | Age: 88
End: 2023-04-25
Payer: MEDICARE

## 2023-04-25 VITALS
WEIGHT: 172 LBS | BODY MASS INDEX: 27 KG/M2 | HEIGHT: 67 IN | TEMPERATURE: 98.6 F | DIASTOLIC BLOOD PRESSURE: 72 MMHG | OXYGEN SATURATION: 98 % | SYSTOLIC BLOOD PRESSURE: 173 MMHG | HEART RATE: 67 BPM

## 2023-04-25 DIAGNOSIS — B35.1 ONYCHOMYCOSIS: ICD-10-CM

## 2023-04-25 DIAGNOSIS — M79.672 FOOT PAIN, BILATERAL: ICD-10-CM

## 2023-04-25 DIAGNOSIS — M20.10 VALGUS DEFORMITY OF GREAT TOE, UNSPECIFIED LATERALITY: Primary | ICD-10-CM

## 2023-04-25 DIAGNOSIS — M79.671 FOOT PAIN, BILATERAL: ICD-10-CM

## 2023-04-25 DIAGNOSIS — L60.0 ONYCHOCRYPTOSIS: ICD-10-CM

## 2023-04-25 DIAGNOSIS — M20.41 HAMMER TOES OF BOTH FEET: ICD-10-CM

## 2023-04-25 DIAGNOSIS — M20.42 HAMMER TOES OF BOTH FEET: ICD-10-CM

## 2023-04-25 DIAGNOSIS — R26.2 DIFFICULTY WALKING: ICD-10-CM

## 2023-04-25 DIAGNOSIS — M20.11 VALGUS DEFORMITY OF BOTH GREAT TOES: ICD-10-CM

## 2023-04-25 DIAGNOSIS — M20.12 VALGUS DEFORMITY OF BOTH GREAT TOES: ICD-10-CM

## 2023-04-25 PROBLEM — C44.92 CANCER, SKIN, SQUAMOUS CELL: Status: ACTIVE | Noted: 2023-04-25

## 2023-04-25 PROBLEM — N32.9 BLADDER DISORDER: Status: ACTIVE | Noted: 2023-04-25

## 2023-04-25 PROBLEM — M19.90 ARTHRITIS: Status: ACTIVE | Noted: 2023-04-25

## 2023-04-25 PROBLEM — M21.619 BUNION: Status: ACTIVE | Noted: 2023-04-25

## 2023-04-25 PROBLEM — M79.89 LIMB SWELLING: Status: ACTIVE | Noted: 2023-04-25

## 2023-04-25 PROBLEM — S42.309A FRACTURE OF UPPER EXTREMITY: Status: ACTIVE | Noted: 2023-04-25

## 2023-04-25 PROBLEM — R06.02 SHORTNESS OF BREATH: Status: ACTIVE | Noted: 2023-04-25

## 2023-04-25 PROBLEM — M25.579 ANKLE PAIN: Status: ACTIVE | Noted: 2023-04-25

## 2023-04-25 PROBLEM — Z96.659 HISTORY OF TOTAL KNEE REPLACEMENT: Status: ACTIVE | Noted: 2018-11-03

## 2023-04-25 PROBLEM — R09.81 SINUS CONGESTION: Status: ACTIVE | Noted: 2023-04-25

## 2023-04-25 PROBLEM — H91.90 HEARING LOSS: Status: ACTIVE | Noted: 2023-04-25

## 2023-04-25 PROBLEM — S09.90XA HEAD INJURY: Status: ACTIVE | Noted: 2023-04-25

## 2023-04-25 PROBLEM — L84 CORNS AND CALLOSITY: Status: ACTIVE | Noted: 2023-04-25

## 2023-04-25 PROBLEM — E78.00 HIGH CHOLESTEROL: Status: ACTIVE | Noted: 2023-04-25

## 2023-04-25 PROBLEM — N28.9 KIDNEY DISORDER: Status: ACTIVE | Noted: 2023-04-25

## 2023-04-25 PROBLEM — I51.9 HEART DISEASE: Status: ACTIVE | Noted: 2023-04-25

## 2023-04-25 PROBLEM — M17.9 OSTEOARTHRITIS OF KNEE: Status: ACTIVE | Noted: 2017-09-11

## 2023-04-25 PROBLEM — H61.20 CERUMEN IMPACTION: Status: ACTIVE | Noted: 2023-04-25

## 2023-04-25 PROBLEM — R11.0 NAUSEA: Status: ACTIVE | Noted: 2023-04-25

## 2023-04-25 PROBLEM — E66.9 OBESITY: Status: ACTIVE | Noted: 2023-04-25

## 2023-04-25 NOTE — PROGRESS NOTES
[facility] - PODIATRY    Today's Date: 04/25/23    Patient Name: Megha Hardy  MRN: [id number]  CSN: [id number]  PCP: Loretta Berry MD, Last PCP Visit: 1/18/2023  Referring Provider: No ref. provider found    SUBJECTIVE     Chief Complaint   Patient presents with   • Left Foot - Follow-up, Nail Problem   • Right Foot - Follow-up, Nail Problem     HPI: Megha Hardy, a 89 y.o.female, comes to clinic.    New, Established, New Problem:  established   Location:  Toenails  Duration:   Greater than five years  Onset:  Gradual  Nature:  sore with palpation.  Stable, worsening, improving:   Stable  Aggravating factors:  Pain with shoe gear and ambulation.  Previous Treatment:  debridement    Medical changes: Treatment for skin cancer on her hands    Patient denies any fevers, chills, nausea, vomiting, shortness of breathe, nor any other constitutional signs nor symptoms.       Past Medical History:   Diagnosis Date   • Ankle pain    • Arthritis    • Bladder disorder    • Broken shoulder 1960   • Bronchitis    • Bunion    • Cancer, skin, squamous cell    • Cerumen impaction    • CHF (congestive heart failure)    • Condition not found     Ulcer   • Corns and callus    • Foot cramps    • Foot pain, bilateral    • Hallux valgus of left foot 03/15/2018   • Hallux valgus of right foot 03/15/2018   • Hammer toe    • Head injury 1960    head injury from falling off back of truck   • Hearing loss    • Heart disease    • High cholesterol    • History of kidney problems    • History of total knee arthroplasty, left 05/07/2018 11/03/2018   • Hyperlipidemia    • Hypertension    • Ingrowing toenail    • Kidney disease    • Limb swelling    • Nausea    • Numbness in feet    • Obesity    • Primary osteoarthritis of left knee 09/11/2017   • Shortness of breath    • Sinus congestion    • Tinea unguium      Past Surgical History:   Procedure Laterality Date   • CORONARY ANGIOPLASTY WITH STENT PLACEMENT  2006   •  OTHER SURGICAL HISTORY      Artificial Joints/Limbs   • SHOULDER SURGERY Left 1963   • SKIN CANCER EXCISION     • TOTAL KNEE ARTHROPLASTY Left 1983   • TOTAL KNEE ARTHROPLASTY Right 2004     Family History   Problem Relation Age of Onset   • Arthritis Mother    • Stroke Mother    • Heart disease Mother    • Diabetes Mother         Mellitus   • Arthritis Father    • Heart disease Father    • Colon cancer Brother 50     Social History     Socioeconomic History   • Marital status:    Tobacco Use   • Smoking status: Never     Passive exposure: Never   • Smokeless tobacco: Never   Vaping Use   • Vaping Use: Never used   Substance and Sexual Activity   • Alcohol use: Never   • Drug use: Never   • Sexual activity: Defer     Allergies   Allergen Reactions   • Sulfa Antibiotics Rash     Current Outpatient Medications   Medication Sig Dispense Refill   • amLODIPine (NORVASC) 10 MG tablet      • amLODIPine (NORVASC) 2.5 MG tablet      • amoxicillin-clavulanate (AUGMENTIN) 875-125 MG per tablet Take 1 tablet by mouth 2 (Two) Times a Day for 10 days. 20 tablet 0   • aspirin 81 MG chewable tablet aspirin 81 mg oral tablet,chewable chew 1 tablet (81 mg) by oral route once daily   Suspended     • atorvastatin (LIPITOR) 40 MG tablet      • azelastine (ASTELIN) 0.1 % nasal spray      • Calcium Carbonate-Vitamin D (Calcium-Vitamin D) 600-125 MG-UNIT tablet Calcium 600 + D(3) 600-125 mg-unit oral tablet take 1 tablet by oral route daily   Active     • Cholecalciferol 125 MCG (5000 UT) tablet Vitamin D3 125 mcg (5,000 unit) oral tablet take 1 tablet by oral route daily   Active     • Diclofenac Sodium (VOLTAREN) 1 % gel gel Apply 4 g topically to the appropriate area as directed 4 (Four) Times a Day As Needed (pain and swelling). 150 g 0   • famotidine (PEPCID) 20 MG tablet      • furosemide (LASIX) 40 MG tablet 1qd     • gabapentin (NEURONTIN) 300 MG capsule gabapentin 300 mg oral capsule take 1 capsule (300 mg) by oral route 1  times per day   Suspended     • metoprolol succinate XL (TOPROL-XL) 25 MG 24 hr tablet      • Multiple Vitamins-Minerals (PRESERVISION AREDS 2+MULTI VIT PO) PreserVision AREDS-2 521-566-18-1 mg-unit-mg-mg oral capsule take 1 capsule by oral route daily   Active     • multivitamin with minerals tablet tablet 1qd     • oseltamivir (Tamiflu) 75 MG capsule Take 1 capsule by mouth 2 (Two) Times a Day. 10 capsule 0   • pantoprazole (PROTONIX) 20 MG EC tablet      • potassium chloride (K-DUR,KLOR-CON) 10 MEQ CR tablet 2 (Two) Times a Day.     • ticagrelor (BRILINTA) 90 MG tablet tablet Brilinta 90 mg oral tablet take 1 tablet (90 mg) by oral route 2 times per day   Suspended     • valsartan (DIOVAN) 40 MG tablet Diovan 40 mg oral tablet take 1 tablet by oral route daily   Active       No current facility-administered medications for this visit.     Review of Systems   Constitutional: Negative.    Skin:        Painful toenails.   All other systems reviewed and are negative.      OBJECTIVE     Vitals:    23 1253   BP: 173/72   Pulse: 67   Temp: 98.6 °F (37 °C)   SpO2: 98%       Patient seen in no apparent distress.      PHYSICAL EXAM:     Foot/Ankle Exam    GENERAL  Appearance:  elderly (Chronically ill)  Orientation:  AAOx3  Affect:  appropriate  Gait:  antalgic  Assistance:  cane use  Right shoe gear: casual shoe  Left shoe gear: casual shoe    VASCULAR     Right Foot Vascularity   Normal vascular exam    Dorsalis pedis:  2+  Posterior tibial:  2+  Skin temperature:  warm  Edema gradin+ and pitting  CFT:  < 3 seconds  Pedal hair growth:  Absent  Varicosities:  severe varicosities     Left Foot Vascularity   Normal vascular exam    Dorsalis pedis:  2+  Posterior tibial:  2+  Skin temperature:  warm  Edema grading:  Pitting and 2+  CFT:  < 3 seconds  Pedal hair growth:  Absent  Varicosities:  severe varicosities     NEUROLOGIC     Right Foot Neurologic   Normal sensation    Light touch sensation: normal  Vibratory  sensation: normal  Hot/Cold sensation: normal     Left Foot Neurologic   Normal sensation    Light touch sensation: normal  Vibratory sensation: normal  Hot/Cold sensation:  normal    MUSCULOSKELETAL     Right Foot Musculoskeletal   Hammertoe:  Second toe and third toe  Hallux valgus: Yes       Left Foot Musculoskeletal   Hammertoe:  Second toe and third toe  Hallux valgus: Yes      MUSCLE STRENGTH     Right Foot Muscle Strength   Foot dorsiflexion:  4-  Foot plantar flexion:  4-  Foot inversion:  4-  Foot eversion:  4-     Left Foot Muscle Strength   Foot dorsiflexion:  4-  Foot plantar flexion:  4-  Foot inversion:  4-  Foot eversion:  4-    RANGE OF MOTION     Right Foot Range of Motion   Foot and ankle ROM within normal limits       Left Foot Range of Motion   Foot and ankle ROM within normal limits      DERMATOLOGIC      Right Foot Dermatologic   Skin  Right foot skin is intact.   Nails  1.  Positive for elongated, onychomycosis, abnormal thickness, subungual debris and ingrown toenail.  2.  Positive for elongated, onychomycosis, abnormal thickness, subungual debris and ingrown toenail.  3.  Positive for elongated, onychomycosis, abnormal thickness, subungual debris and ingrown toenail.  4.  Positive for elongated, onychomycosis, abnormal thickness, subungual debris and ingrown toenail.  5.  Positive for elongated, onychomycosis, abnormal thickness, subungual debris and ingrown toenail.     Left Foot Dermatologic   Skin  Left foot skin is intact.   Nails  1.  Positive for elongated, onychomycosis, abnormal thickness, subungual debris and ingrown toenail.  2.  Positive for elongated, onychomycosis, abnormal thickness, subungual debris and ingrown toenail.  3.  Positive for elongated, onychomycosis, abnormal thickness, subungual debris and ingrown toenail.  4.  Positive for elongated, onychomycosis, abnormally thick, subungual debris and ingrown toenail.  5.  Positive for elongated, onychomycosis, abnormally  thick, subungual debris and ingrown toenail.      ASSESSMENT/PLAN     Diagnoses and all orders for this visit:    1. Valgus deformity of great toe, unspecified laterality (Primary)    2. Valgus deformity of both great toes    3. Hammer toes of both feet    4. Foot pain, bilateral    5. Onychocryptosis    6. Onychomycosis    7. Difficulty walking        Comprehensive lower extremity examination and evaluation was performed.    Discussed findings and treatment plan including risks, benefits, and treatment options with patient in detail. Patient agreed with treatment plan.    Nails 1 through 5 bilaterally were debrided in thickness and length and then smoothed with a Dremel Tool.  Tolerated the procedure well without complications.    An After Visit Summary was printed and given to the patient at discharge, including (if requested) any available informative/educational handouts regarding diagnosis, treatment, or medications. All questions were answered to patient/family satisfaction. Should symptoms fail to improve or worsen they agree to call or return to clinic or to go to the Emergency Department. Discussed the importance of following up with any needed screening tests/labs/specialist appointments and any requested follow-up recommended by me today. Importance of maintaining follow-up discussed and patient accepts that missed appointments can delay diagnosis and potentially lead to worsening of conditions.    Return in about 9 weeks (around 6/27/2023) for Toenail Care., or sooner if acute issues arise.    This document has been electronically signed by Aidan Lundberg DPM on April 25, 2023 13:32 EDT

## 2023-05-09 ENCOUNTER — OFFICE VISIT (OUTPATIENT)
Dept: OTOLARYNGOLOGY | Facility: CLINIC | Age: 88
End: 2023-05-09
Payer: MEDICARE

## 2023-05-09 VITALS
DIASTOLIC BLOOD PRESSURE: 75 MMHG | HEIGHT: 67 IN | BODY MASS INDEX: 28.28 KG/M2 | SYSTOLIC BLOOD PRESSURE: 170 MMHG | TEMPERATURE: 97.8 F | WEIGHT: 180.2 LBS | HEART RATE: 60 BPM

## 2023-05-09 DIAGNOSIS — K11.21 ACUTE BACTERIAL SIALADENITIS: ICD-10-CM

## 2023-05-09 DIAGNOSIS — H90.3 SENSORINEURAL HEARING LOSS (SNHL) OF BOTH EARS: ICD-10-CM

## 2023-05-09 DIAGNOSIS — B96.89 ACUTE BACTERIAL SIALADENITIS: ICD-10-CM

## 2023-05-09 DIAGNOSIS — H61.23 BILATERAL IMPACTED CERUMEN: Primary | ICD-10-CM

## 2023-05-09 NOTE — PROGRESS NOTES
Patient Name: Megha Hardy   Visit Date: 05/09/2023   Patient ID: [id number]  Provider: Sekou Hood MD    Sex: female  Location: [facility]   YOB: 1930  Location Address: [address] [place] [address], [address] [place] [address],?[place]?[id number]    Primary Care Provider Loretta Berry MD  Location Phone: [phone]    Referring Provider: No ref. provider found        Chief Complaint  Follow-up (Pain side of head/jaw)    Subjective    History of Present Illness  Megha Hardy is a 89 y.o. female who presents to [facility] today as a consult from No ref. provider found.    She presents to the clinic today for evaluation of right jaw pain and sialadenitis, cerumen impactions, and sensorineural hearing loss.  She informs me that she had significant jaw pain and swelling about 2 months ago and was treated with antibiotics for sialadenitis.  She notes that the swelling subsided and she is no longer having the jaw pain.  She is on a water pill, but does try to drink adequately throughout the day.  She has not had any issues with this before.  Denies any trouble with TMJ previously.  She was seen in our clinic several years ago by our nurse practitioner for other reasons.    She does have sensorineural hearing loss and uses hearing aids for this with good benefit.  She does have cerumen issues and asked to be evaluated for this today.    Past Medical History:   Diagnosis Date   • Ankle pain    • Arthritis    • Bladder disorder    • Broken shoulder 1960   • Bronchitis    • Bunion    • Cancer, skin, squamous cell    • Cerumen impaction    • CHF (congestive heart failure)    • Condition not found     Ulcer   • Corns and callus    • Foot cramps    • Foot pain, bilateral    • Hallux valgus of left foot 03/15/2018   • Hallux valgus of right foot 03/15/2018   • Hammer toe    • Head injury 1960    head injury from falling off back of truck   • Hearing loss    • Heart  disease    • High cholesterol    • History of kidney problems    • History of total knee arthroplasty, left 05/07/2018 11/03/2018   • Hyperlipidemia    • Hypertension    • Ingrowing toenail    • Kidney disease    • Limb swelling    • Nausea    • Numbness in feet    • Obesity    • Primary osteoarthritis of left knee 09/11/2017   • Shortness of breath    • Sinus congestion    • Tinea unguium        Past Surgical History:   Procedure Laterality Date   • CORONARY ANGIOPLASTY WITH STENT PLACEMENT  2006   • OTHER SURGICAL HISTORY      Artificial Joints/Limbs   • SHOULDER SURGERY Left 1963   • SKIN CANCER EXCISION     • TOTAL KNEE ARTHROPLASTY Left 1983   • TOTAL KNEE ARTHROPLASTY Right 2004         Current Outpatient Medications:   •  amLODIPine (NORVASC) 10 MG tablet, , Disp: , Rfl:   •  amLODIPine (NORVASC) 2.5 MG tablet, , Disp: , Rfl:   •  aspirin 81 MG chewable tablet, aspirin 81 mg oral tablet,chewable chew 1 tablet (81 mg) by oral route once daily   Suspended, Disp: , Rfl:   •  atorvastatin (LIPITOR) 40 MG tablet, , Disp: , Rfl:   •  Calcium Carbonate-Vitamin D (Calcium-Vitamin D) 600-125 MG-UNIT tablet, Calcium 600 + D(3) 600-125 mg-unit oral tablet take 1 tablet by oral route daily   Active, Disp: , Rfl:   •  Cholecalciferol 125 MCG (5000 UT) tablet, Vitamin D3 125 mcg (5,000 unit) oral tablet take 1 tablet by oral route daily   Active, Disp: , Rfl:   •  Diclofenac Sodium (VOLTAREN) 1 % gel gel, Apply 4 g topically to the appropriate area as directed 4 (Four) Times a Day As Needed (pain and swelling)., Disp: 150 g, Rfl: 0  •  famotidine (PEPCID) 20 MG tablet, , Disp: , Rfl:   •  gabapentin (NEURONTIN) 300 MG capsule, gabapentin 300 mg oral capsule take 1 capsule (300 mg) by oral route 1 times per day   Suspended, Disp: , Rfl:   •  metoprolol succinate XL (TOPROL-XL) 25 MG 24 hr tablet, , Disp: , Rfl:   •  Multiple Vitamins-Minerals (PRESERVISION AREDS 2+MULTI VIT PO), PreserVision AREDS-2 952-059-03-1  "mg-unit-mg-mg oral capsule take 1 capsule by oral route daily   Active, Disp: , Rfl:   •  multivitamin with minerals tablet tablet, 1qd, Disp: , Rfl:   •  pantoprazole (PROTONIX) 20 MG EC tablet, , Disp: , Rfl:   •  azelastine (ASTELIN) 0.1 % nasal spray, , Disp: , Rfl:   •  furosemide (LASIX) 40 MG tablet, 1qd (Patient not taking: Reported on 5/9/2023), Disp: , Rfl:   •  oseltamivir (Tamiflu) 75 MG capsule, Take 1 capsule by mouth 2 (Two) Times a Day. (Patient not taking: Reported on 5/9/2023), Disp: 10 capsule, Rfl: 0  •  potassium chloride (K-DUR,KLOR-CON) 10 MEQ CR tablet, 2 (Two) Times a Day. (Patient not taking: Reported on 5/9/2023), Disp: , Rfl:   •  ticagrelor (BRILINTA) 90 MG tablet tablet, Brilinta 90 mg oral tablet take 1 tablet (90 mg) by oral route 2 times per day   Suspended (Patient not taking: Reported on 5/9/2023), Disp: , Rfl:   •  valsartan (DIOVAN) 40 MG tablet, Diovan 40 mg oral tablet take 1 tablet by oral route daily   Active (Patient not taking: Reported on 5/9/2023), Disp: , Rfl:      Allergies   Allergen Reactions   • Sulfa Antibiotics Rash       Family History   Problem Relation Age of Onset   • Arthritis Mother    • Stroke Mother    • Heart disease Mother    • Diabetes Mother         Mellitus   • Arthritis Father    • Heart disease Father    • Colon cancer Brother 50        Social History     Social History Narrative   • Not on file       Objective     Vital Signs:   /75 (BP Location: Left arm, Patient Position: Sitting, Cuff Size: Adult)   Pulse 60   Temp 97.8 °F (36.6 °C) (Tympanic)   Ht 170.2 cm (67.01\")   Wt 81.7 kg (180 lb 3.2 oz)   BMI 28.21 kg/m²       Physical Exam    Ear Cerumen Removal    Date/Time: 5/9/2023 10:24 AM  Performed by: Sekou Hood MD  Authorized by: Sekou Hood MD     Anesthesia:  Local Anesthetic: none  Location details: left ear and right ear  Procedure type: instrumentation, curette   Sedation:  Patient sedated: " no            Constitutional   Appearance  · : well developed, well-nourished, alert and in no acute distress, voice clear and strong    Head  Inspection  · : no deformities or lesions  Face  Inspection  · : No facial lesions; House-Brackmann I/VI bilaterally  Palpation  · : No TMJ crepitus nor  muscle tenderness bilaterally    Eyes  Vision  Visual Fields  · : Extraocular movements are intact. No spontaneous or gaze-induced nystagmus.  Conjunctivae  · : clear  Sclerae  · : clear  Pupils and Irises  · : pupils equal, round, and reactive to light.     Ears, Nose, Mouth and Throat    Ears    External Ears  · : appearance within normal limits, no lesions present  Otoscopic Examination  · : Cerumen impactions, cleared, tympanic membrane appearance within normal limits bilaterally without perforations, well-aerated middle ears  Hearing  · : intact to conversational voice both ears  Tunning fork testing:     :    Nose    External Nose  · : appearance normal  Intranasal Exam  · : mucosa within normal limits, vestibules normal, no intranasal lesions present, septum midline, sinuses non tender to percussion  Oral Cavity    Oral Mucosa  · : oral mucosa normal without pallor or cyanosis  Lips  · : lip appearance normal  Teeth  · : normal dentition for age  Gums  · : gums pink, non-swollen, no bleeding present  Tongue  · : tongue appearance normal; normal mobility  Palate  · : hard palate normal, soft palate appearance normal with symmetric mobility    Throat    Oropharynx  · : no inflammation or lesions present, tonsils within normal limits  Hypopharynx  · : appearance within normal limits, superior epiglottis within normal limits  Larynx  · : appearance within normal limits, vocal cords within normal limits, no lesions present    Neck  Inspection/Palpation  · : normal appearance, no masses or tenderness, trachea midline; thyroid size normal, nontender, no nodules or masses present on  palpation    Respiratory  Respiratory Effort  · : breathing unlabored  Inspection of Chest  · : normal appearance, no retractions    Cardiovascular  Heart  · : regular rate and rhythm    Lymphatic  Neck  · : no lymphadenopathy present  Supraclavicular Nodes  · : no lymphadenopathy present  Preauricular Nodes  · : no lymphadenopathy present    Skin and Subcutaneous Tissue  General Inspection  · : Regarding face and neck - there are no rashes present, no lesions present, and no areas of discoloration    Neurologic  Cranial Nerves  · : cranial nerves II-XII are grossly intact bilaterally  Gait and Station  · : normal gait, able to stand without diffculty    Psychiatric  Judgement and Insight  · : judgment and insight intact  Mood and Affect  · : mood normal, affect appropriate          Assessment and Plan    Diagnoses and all orders for this visit:    1. Bilateral impacted cerumen (Primary)    2. Sensorineural hearing loss (SNHL) of both ears    3. Acute bacterial sialadenitis    Other orders  -     Ear Cerumen Removal    Examination today revealed the TMJs and submandibular glands to be completely normal and free of any infection.  I recommended increasing her hydration and using either sour candy or lemon water on occasion to help with this preventatively.  We discussed sialadenitis in general.  Ear exam revealed cerumen impactions which I was able to clear for her.  Her eardrums look normal and I will have her continue using her hearing aids.  I will be glad to see her back in the clinic should there be any further issues.    Follow Up   No follow-ups on file.  Patient was given instructions and counseling regarding her condition or for health maintenance advice. Please see specific information pulled into the AVS if appropriate.

## 2023-07-25 ENCOUNTER — OFFICE VISIT (OUTPATIENT)
Dept: PODIATRY | Facility: CLINIC | Age: 88
End: 2023-07-25
Payer: MEDICARE

## 2023-07-25 VITALS
TEMPERATURE: 97.3 F | HEART RATE: 65 BPM | SYSTOLIC BLOOD PRESSURE: 151 MMHG | DIASTOLIC BLOOD PRESSURE: 72 MMHG | WEIGHT: 180 LBS | OXYGEN SATURATION: 97 % | BODY MASS INDEX: 28.18 KG/M2

## 2023-07-25 DIAGNOSIS — M79.671 FOOT PAIN, BILATERAL: ICD-10-CM

## 2023-07-25 DIAGNOSIS — M20.41 HAMMER TOES OF BOTH FEET: ICD-10-CM

## 2023-07-25 DIAGNOSIS — M20.11 VALGUS DEFORMITY OF BOTH GREAT TOES: ICD-10-CM

## 2023-07-25 DIAGNOSIS — M20.10 VALGUS DEFORMITY OF GREAT TOE, UNSPECIFIED LATERALITY: Primary | ICD-10-CM

## 2023-07-25 DIAGNOSIS — M79.672 FOOT PAIN, BILATERAL: ICD-10-CM

## 2023-07-25 DIAGNOSIS — L60.0 ONYCHOCRYPTOSIS: ICD-10-CM

## 2023-07-25 DIAGNOSIS — B35.1 ONYCHOMYCOSIS: ICD-10-CM

## 2023-07-25 DIAGNOSIS — R26.2 DIFFICULTY WALKING: ICD-10-CM

## 2023-07-25 DIAGNOSIS — M20.12 VALGUS DEFORMITY OF BOTH GREAT TOES: ICD-10-CM

## 2023-07-25 DIAGNOSIS — M20.42 HAMMER TOES OF BOTH FEET: ICD-10-CM

## 2023-07-25 PROCEDURE — 1160F RVW MEDS BY RX/DR IN RCRD: CPT | Performed by: PODIATRIST

## 2023-07-25 PROCEDURE — 11721 DEBRIDE NAIL 6 OR MORE: CPT | Performed by: PODIATRIST

## 2023-07-25 PROCEDURE — 1159F MED LIST DOCD IN RCRD: CPT | Performed by: PODIATRIST

## 2023-07-25 NOTE — PROGRESS NOTES
[facility] - PODIATRY    Today's Date: 07/25/23    Patient Name: Megha Hardy  MRN: [id number]  CSN: [id number]  PCP: Loretta Berry MD, Last PCP Visit: 5/25/2023  Referring Provider: No ref. provider found    SUBJECTIVE     Chief Complaint   Patient presents with    Left Foot - Follow-up, Nail Problem    Right Foot - Follow-up, Nail Problem     HPI: Megha Hardy, a 89 y.o.female, comes to clinic.    New, Established, New Problem:  established   Location:  Toenails  Duration:   Greater than five years  Onset:  Gradual  Nature:  sore with palpation.  Stable, worsening, improving:   Stable  Aggravating factors:  Pain with shoe gear and ambulation.  Previous Treatment:  debridement    Medical changes: none.    Patient denies any fevers, chills, nausea, vomiting, shortness of breathe, nor any other constitutional signs nor symptoms.       Past Medical History:   Diagnosis Date    Ankle pain     Arthritis     Bladder disorder     Broken shoulder 1960    Bronchitis     Bunion     Cancer, skin, squamous cell     Cerumen impaction     CHF (congestive heart failure)     Condition not found     Ulcer    Corns and callus     Foot cramps     Foot pain, bilateral     Hallux valgus of left foot 03/15/2018    Hallux valgus of right foot 03/15/2018    Hammer toe     Head injury 1960    head injury from falling off back of truck    Hearing loss     Heart disease     High cholesterol     History of kidney problems     History of total knee arthroplasty, left 05/07/2018 11/03/2018    Hyperlipidemia     Hypertension     Ingrowing toenail     Kidney disease     Limb swelling     Nausea     Numbness in feet     Obesity     Primary osteoarthritis of left knee 09/11/2017    Shortness of breath     Sinus congestion     Tinea unguium      Past Surgical History:   Procedure Laterality Date    CORONARY ANGIOPLASTY WITH STENT PLACEMENT  2006    OTHER SURGICAL HISTORY      Artificial Joints/Limbs    SHOULDER SURGERY  Left 1963    SKIN CANCER EXCISION      TOTAL KNEE ARTHROPLASTY Left 1983    TOTAL KNEE ARTHROPLASTY Right 2004     Family History   Problem Relation Age of Onset    Arthritis Mother     Stroke Mother     Heart disease Mother     Diabetes Mother         Mellitus    Arthritis Father     Heart disease Father     Colon cancer Brother 50     Social History     Socioeconomic History    Marital status:    Tobacco Use    Smoking status: Never     Passive exposure: Past    Smokeless tobacco: Never   Vaping Use    Vaping Use: Never used   Substance and Sexual Activity    Alcohol use: Never    Drug use: Never    Sexual activity: Defer     Allergies   Allergen Reactions    Sulfa Antibiotics Rash     Current Outpatient Medications   Medication Sig Dispense Refill    amLODIPine (NORVASC) 10 MG tablet       amLODIPine (NORVASC) 2.5 MG tablet       aspirin 81 MG chewable tablet aspirin 81 mg oral tablet,chewable chew 1 tablet (81 mg) by oral route once daily   Suspended      atorvastatin (LIPITOR) 40 MG tablet       Calcium Carbonate-Vitamin D (Calcium-Vitamin D) 600-125 MG-UNIT tablet Calcium 600 + D(3) 600-125 mg-unit oral tablet take 1 tablet by oral route daily   Active      Cholecalciferol 125 MCG (5000 UT) tablet Vitamin D3 125 mcg (5,000 unit) oral tablet take 1 tablet by oral route daily   Active      Diclofenac Sodium (VOLTAREN) 1 % gel gel Apply 4 g topically to the appropriate area as directed 4 (Four) Times a Day As Needed (pain and swelling). 150 g 0    famotidine (PEPCID) 20 MG tablet       gabapentin (NEURONTIN) 300 MG capsule gabapentin 300 mg oral capsule take 1 capsule (300 mg) by oral route 1 times per day   Suspended      metoprolol succinate XL (TOPROL-XL) 25 MG 24 hr tablet       Multiple Vitamins-Minerals (PRESERVISION AREDS 2+MULTI VIT PO) PreserVision AREDS-2 069-387-33-1 mg-unit-mg-mg oral capsule take 1 capsule by oral route daily   Active      multivitamin with minerals tablet tablet 1qd       pantoprazole (PROTONIX) 20 MG EC tablet       azelastine (ASTELIN) 0.1 % nasal spray  (Patient not taking: Reported on 2023)      furosemide (LASIX) 40 MG tablet 1qd (Patient not taking: Reported on 2023)      oseltamivir (Tamiflu) 75 MG capsule Take 1 capsule by mouth 2 (Two) Times a Day. (Patient not taking: Reported on 2023) 10 capsule 0    potassium chloride (K-DUR,KLOR-CON) 10 MEQ CR tablet 2 (Two) Times a Day. (Patient not taking: Reported on 2023)      ticagrelor (BRILINTA) 90 MG tablet tablet Brilinta 90 mg oral tablet take 1 tablet (90 mg) by oral route 2 times per day   Suspended (Patient not taking: Reported on 2023)      valsartan (DIOVAN) 40 MG tablet Diovan 40 mg oral tablet take 1 tablet by oral route daily   Active (Patient not taking: Reported on 2023)       No current facility-administered medications for this visit.     Review of Systems   Constitutional: Negative.    Skin:         Painful toenails.   All other systems reviewed and are negative.    OBJECTIVE     Vitals:    23 0850   BP: 151/72   Pulse: 65   Temp: 97.3 °F (36.3 °C)   SpO2: 97%         Patient seen in no apparent distress.      PHYSICAL EXAM:     Foot/Ankle Exam    GENERAL  Appearance:  elderly (Chronically ill)  Orientation:  AAOx3  Affect:  appropriate  Gait:  antalgic  Assistance:  cane use  Right shoe gear: casual shoe  Left shoe gear: casual shoe    VASCULAR     Right Foot Vascularity   Dorsalis pedis:  2+  Posterior tibial:  2+  Skin temperature:  warm  Edema gradin+ and pitting  CFT:  < 3 seconds  Pedal hair growth:  Absent  Varicosities:  severe varicosities     Left Foot Vascularity   Dorsalis pedis:  2+  Posterior tibial:  2+  Skin temperature:  warm  Edema grading:  Pitting and 2+  CFT:  < 3 seconds  Pedal hair growth:  Absent  Varicosities:  severe varicosities     NEUROLOGIC     Right Foot Neurologic   Normal sensation    Light touch sensation: normal  Vibratory sensation:  normal  Hot/Cold sensation: normal  Protective Sensation using [place] Monofilament:   Sites intact: 10  Sites tested: 10     Left Foot Neurologic   Normal sensation    Light touch sensation: normal  Vibratory sensation: normal  Hot/Cold sensation:  normal  Protective Sensation using [place]ell Monofilament:   Sites intact: 10  Sites tested: 10    MUSCULOSKELETAL     Right Foot Musculoskeletal   Hammertoe:  Second toe and third toe  Hallux valgus: Yes       Left Foot Musculoskeletal   Hammertoe:  Second toe and third toe  Hallux valgus: Yes      MUSCLE STRENGTH     Right Foot Muscle Strength   Foot dorsiflexion:  4-  Foot plantar flexion:  4-  Foot inversion:  4-  Foot eversion:  4-     Left Foot Muscle Strength   Foot dorsiflexion:  4-  Foot plantar flexion:  4-  Foot inversion:  4-  Foot eversion:  4-    RANGE OF MOTION     Right Foot Range of Motion   Foot and ankle ROM within normal limits       Left Foot Range of Motion   Foot and ankle ROM within normal limits      DERMATOLOGIC      Right Foot Dermatologic   Skin  Right foot skin is intact.   Nails  1.  Positive for elongated, onychomycosis, abnormal thickness, subungual debris and ingrown toenail.  2.  Positive for elongated, onychomycosis, abnormal thickness, subungual debris and ingrown toenail.  3.  Positive for elongated, onychomycosis, abnormal thickness, subungual debris and ingrown toenail.  4.  Positive for elongated, onychomycosis, abnormal thickness, subungual debris and ingrown toenail.  5.  Positive for elongated, onychomycosis, abnormal thickness, subungual debris and ingrown toenail.     Left Foot Dermatologic   Skin  Left foot skin is intact.   Nails  1.  Positive for elongated, onychomycosis, abnormal thickness, subungual debris and ingrown toenail.  2.  Positive for elongated, onychomycosis, abnormal thickness, subungual debris and ingrown toenail.  3.  Positive for elongated, onychomycosis, abnormal thickness, subungual debris  and ingrown toenail.  4.  Positive for elongated, onychomycosis, abnormally thick, subungual debris and ingrown toenail.  5.  Positive for elongated, onychomycosis, abnormally thick, subungual debris and ingrown toenail.    ASSESSMENT/PLAN     Diagnoses and all orders for this visit:    1. Valgus deformity of great toe, unspecified laterality (Primary)    2. Valgus deformity of both great toes    3. Hammer toes of both feet    4. Onychomycosis    5. Onychocryptosis    6. Foot pain, bilateral    7. Difficulty walking        Comprehensive lower extremity examination and evaluation was performed.    Discussed findings and treatment plan including risks, benefits, and treatment options with patient in detail. Patient agreed with treatment plan.    Nails 1 through 5 bilaterally were debrided in thickness and length and then smoothed with a Dremel Tool.  Tolerated the procedure well without complications.    An After Visit Summary was printed and given to the patient at discharge, including (if requested) any available informative/educational handouts regarding diagnosis, treatment, or medications. All questions were answered to patient/family satisfaction. Should symptoms fail to improve or worsen they agree to call or return to clinic or to go to the Emergency Department. Discussed the importance of following up with any needed screening tests/labs/specialist appointments and any requested follow-up recommended by me today. Importance of maintaining follow-up discussed and patient accepts that missed appointments can delay diagnosis and potentially lead to worsening of conditions.    Return in about 9 weeks (around 9/26/2023) for Toenail Care., or sooner if acute issues arise.    This document has been electronically signed by Aidan Lundberg DPM on July 25, 2023 08:59 EDT

## 2023-08-29 ENCOUNTER — TELEPHONE (OUTPATIENT)
Dept: CARDIOLOGY | Facility: CLINIC | Age: 88
End: 2023-08-29

## 2023-09-18 PROBLEM — E78.5 HYPERLIPIDEMIA LDL GOAL <70: Status: ACTIVE | Noted: 2023-04-25

## 2023-09-18 PROBLEM — I25.10 CAD S/P PERCUTANEOUS CORONARY ANGIOPLASTY: Status: ACTIVE | Noted: 2023-09-18

## 2023-09-18 PROBLEM — I10 ESSENTIAL HYPERTENSION: Status: ACTIVE | Noted: 2023-09-18

## 2023-09-18 PROBLEM — Z98.61 CAD S/P PERCUTANEOUS CORONARY ANGIOPLASTY: Status: ACTIVE | Noted: 2023-09-18

## 2023-09-18 NOTE — PROGRESS NOTES
Chief Complaint  Establish Care, Hypertension (Heart disease), Coronary Artery Disease, and Hyperlipidemia      History of Present Illness  Megha Hardy presents to [facility] CARDIOLOGY  Patient is a 89-year-old with a prior history of CAD with stenting, diastolic congestive heart failure, essential hypertension, and dyslipidemia is here to establish care.  She reports chronic lower extremity edema which is unchanged but does cause some discomfort.  She has also had dyspnea on exertion longstanding not increasing intensity.  Denies any anginal chest discomfort.  She recently was instructed to take her Lasix on a daily basis but does have difficulty with compliance for this at times due to problems with getting up to urinate.    Past Medical History:   Diagnosis Date    Ankle pain     Arthritis     Bladder disorder     Broken shoulder 1960    Bronchitis     Bunion     Cancer, skin, squamous cell     Cerumen impaction     CHF (congestive heart failure)     Condition not found     Ulcer    Corns and callus     Foot cramps     Foot pain, bilateral     Hallux valgus of left foot 03/15/2018    Hallux valgus of right foot 03/15/2018    Hammer toe     Head injury 1960    head injury from falling off back of truck    Hearing loss     Heart disease     High cholesterol     History of kidney problems     History of total knee arthroplasty, left 05/07/2018 11/03/2018    Hyperlipidemia     Hypertension     Ingrowing toenail     Kidney disease     Limb swelling     Nausea     Numbness in feet     Obesity     Primary osteoarthritis of left knee 09/11/2017    Shortness of breath     Sinus congestion     Tinea unguium          Current Outpatient Medications:     amLODIPine (NORVASC) 10 MG tablet, , Disp: , Rfl:     aspirin 81 MG chewable tablet, aspirin 81 mg oral tablet,chewable chew 1 tablet (81 mg) by oral route once daily   Suspended, Disp: , Rfl:     atorvastatin (LIPITOR) 40 MG tablet, , Disp: , Rfl:      Calcium Carbonate-Vitamin D (Calcium-Vitamin D) 600-125 MG-UNIT tablet, Calcium 600 + D(3) 600-125 mg-unit oral tablet take 1 tablet by oral route daily   Active, Disp: , Rfl:     Cholecalciferol 125 MCG (5000 UT) tablet, Vitamin D3 125 mcg (5,000 unit) oral tablet take 1 tablet by oral route daily   Active, Disp: , Rfl:     famotidine (PEPCID) 20 MG tablet, , Disp: , Rfl:     furosemide (LASIX) 40 MG tablet, 1qd, Disp: , Rfl:     metoprolol succinate XL (TOPROL-XL) 25 MG 24 hr tablet, , Disp: , Rfl:     Multiple Vitamins-Minerals (PRESERVISION AREDS 2+MULTI VIT PO), PreserVision AREDS-2 240-275-48-1 mg-unit-mg-mg oral capsule take 1 capsule by oral route daily   Active, Disp: , Rfl:     multivitamin with minerals tablet tablet, 1qd, Disp: , Rfl:     potassium chloride (K-DUR,KLOR-CON) 10 MEQ CR tablet, 2 (Two) Times a Day., Disp: , Rfl:     azelastine (ASTELIN) 0.1 % nasal spray, , Disp: , Rfl:     Diclofenac Sodium (VOLTAREN) 1 % gel gel, Apply 4 g topically to the appropriate area as directed 4 (Four) Times a Day As Needed (pain and swelling). (Patient not taking: Reported on 9/21/2023), Disp: 150 g, Rfl: 0    gabapentin (NEURONTIN) 300 MG capsule, gabapentin 300 mg oral capsule take 1 capsule (300 mg) by oral route 1 times per day   Suspended (Patient not taking: Reported on 9/21/2023), Disp: , Rfl:     valsartan (DIOVAN) 40 MG tablet, Diovan 40 mg oral tablet take 1 tablet by oral route daily   Active (Patient not taking: Reported on 5/9/2023), Disp: , Rfl:     Medications Discontinued During This Encounter   Medication Reason    oseltamivir (Tamiflu) 75 MG capsule *Therapy completed    amLODIPine (NORVASC) 2.5 MG tablet Alternate therapy    pantoprazole (PROTONIX) 20 MG EC tablet Alternate therapy    ticagrelor (BRILINTA) 90 MG tablet tablet      Allergies   Allergen Reactions    Sulfa Antibiotics Rash        Social History     Tobacco Use    Smoking status: Never     Passive exposure: Past    Smokeless  "tobacco: Never   Vaping Use    Vaping Use: Never used   Substance Use Topics    Alcohol use: Never    Drug use: Never       Family History   Problem Relation Age of Onset    Arthritis Mother     Stroke Mother     Heart disease Mother     Diabetes Mother         Mellitus    Arthritis Father     Heart disease Father     Colon cancer Brother 50        Objective     /62   Pulse 61   Ht 170.2 cm (67.01\")   Wt 76.7 kg (169 lb)   BMI 26.46 kg/m²       Physical Exam    General Appearance:   no acute distress  Alert and oriented x3  HENT:   lips not cyanotic  Atraumatic  Neck:  No jvd   supple  Respiratory:  no respiratory distress  normal breath sounds  no rales  Cardiovascular:  Regular rate and rhythm  no S3, no S4   no murmur  no rub  Extremities  No cyanosis  lower extremity edema: 2+  Skin:   warm, dry  No rashes    Result Review :     No results found for: PROBNP       No results found for: TSH   No results found for: FREET4   No results found for: DDIMERQUANT  Magnesium   Date Value Ref Range Status   08/28/2022 2.2 1.7 - 2.3 mg/dL Final      No results found for: DIGOXIN   Lab Results   Component Value Date    TROPONINT <0.010 08/28/2022      No results found for: POCTROP(              ECG 12 Lead    Date/Time: 9/21/2023 10:28 AM  Performed by: Justyn oGff MD  Authorized by: Justyn Goff MD   Comparison: compared with previous ECG   Similar to previous ECG  Rhythm: sinus rhythm  Other findings: low voltage  Comments: PVCs  Possible old anterior septal infarct       No results found for this or any previous visit.             The ASCVD Risk score (Ian DK, et al., 2019) failed to calculate for the following reasons:    The 2019 ASCVD risk score is only valid for ages 40 to 79     Diagnoses and all orders for this visit:    1. CAD S/P percutaneous coronary angioplasty (Primary)  Overview:  Left heart cath 2018      Assessment & Plan:  Patient is doing well no ongoing angina continue with chronic aspirin " 81 mg daily      Orders:  -     ECG 12 Lead    2. Chronic heart failure with preserved ejection fraction (HFpEF)  Assessment & Plan:  Patient with stable symptoms but persistent lower extremity edema which does appear to be somewhat uncomfortable she per her son has not been taking her medications regularly but now recently instructed to take on a daily basis she states that she will try to be compliant with this.  Also instructed her to try to limit her fluid intake to less than 2 L/day and try to adhere to a low-sodium diet less than 2 and half grams.      3. Hyperlipidemia LDL goal <70    4. Essential hypertension  Assessment & Plan:  Blood pressure in office today was elevated patient Jeovanny this is likely whitecoat related as blood pressures at home tend to have been better controlled encourage him to keep blood pressure log given her age would not recommend aggressive titration of her blood pressure control continue with her current dose of amlodipine 10 mg once a day and Toprol 25 daily              Follow Up     Return in about 6 months (around 3/21/2024) for Follow with Stella Brar.          Patient was given instructions and counseling regarding her condition or for health maintenance advice. Please see specific information pulled into the AVS if appropriate.

## 2023-09-21 ENCOUNTER — OFFICE VISIT (OUTPATIENT)
Dept: CARDIOLOGY | Facility: CLINIC | Age: 88
End: 2023-09-21
Payer: MEDICARE

## 2023-09-21 VITALS
BODY MASS INDEX: 26.53 KG/M2 | DIASTOLIC BLOOD PRESSURE: 62 MMHG | WEIGHT: 169 LBS | HEIGHT: 67 IN | HEART RATE: 61 BPM | SYSTOLIC BLOOD PRESSURE: 163 MMHG

## 2023-09-21 DIAGNOSIS — I25.10 CAD S/P PERCUTANEOUS CORONARY ANGIOPLASTY: Primary | ICD-10-CM

## 2023-09-21 DIAGNOSIS — E78.5 HYPERLIPIDEMIA LDL GOAL <70: ICD-10-CM

## 2023-09-21 DIAGNOSIS — I50.32 CHRONIC HEART FAILURE WITH PRESERVED EJECTION FRACTION (HFPEF): ICD-10-CM

## 2023-09-21 DIAGNOSIS — I10 ESSENTIAL HYPERTENSION: ICD-10-CM

## 2023-09-21 DIAGNOSIS — Z98.61 CAD S/P PERCUTANEOUS CORONARY ANGIOPLASTY: Primary | ICD-10-CM

## 2023-09-21 NOTE — ASSESSMENT & PLAN NOTE
Blood pressure in office today was elevated patient olaf this is likely whitecoat related as blood pressures at home tend to have been better controlled encourage him to keep blood pressure log given her age would not recommend aggressive titration of her blood pressure control continue with her current dose of amlodipine 10 mg once a day and Toprol 25 daily

## 2023-09-21 NOTE — ASSESSMENT & PLAN NOTE
Patient with stable symptoms but persistent lower extremity edema which does appear to be somewhat uncomfortable she per her son has not been taking her medications regularly but now recently instructed to take on a daily basis she states that she will try to be compliant with this.  Also instructed her to try to limit her fluid intake to less than 2 L/day and try to adhere to a low-sodium diet less than 2 and half grams.

## 2023-10-17 ENCOUNTER — OFFICE VISIT (OUTPATIENT)
Dept: PODIATRY | Facility: CLINIC | Age: 88
End: 2023-10-17
Payer: MEDICARE

## 2023-10-17 VITALS
SYSTOLIC BLOOD PRESSURE: 169 MMHG | DIASTOLIC BLOOD PRESSURE: 81 MMHG | BODY MASS INDEX: 26.77 KG/M2 | HEART RATE: 69 BPM | TEMPERATURE: 98.5 F | WEIGHT: 171 LBS | OXYGEN SATURATION: 94 %

## 2023-10-17 DIAGNOSIS — B35.1 ONYCHOMYCOSIS: ICD-10-CM

## 2023-10-17 DIAGNOSIS — M79.672 FOOT PAIN, BILATERAL: ICD-10-CM

## 2023-10-17 DIAGNOSIS — M20.41 HAMMER TOES OF BOTH FEET: ICD-10-CM

## 2023-10-17 DIAGNOSIS — M20.10 VALGUS DEFORMITY OF GREAT TOE, UNSPECIFIED LATERALITY: ICD-10-CM

## 2023-10-17 DIAGNOSIS — M79.671 FOOT PAIN, BILATERAL: ICD-10-CM

## 2023-10-17 DIAGNOSIS — M20.12 VALGUS DEFORMITY OF BOTH GREAT TOES: ICD-10-CM

## 2023-10-17 DIAGNOSIS — R26.2 DIFFICULTY WALKING: ICD-10-CM

## 2023-10-17 DIAGNOSIS — M20.42 HAMMER TOES OF BOTH FEET: ICD-10-CM

## 2023-10-17 DIAGNOSIS — L60.0 ONYCHOCRYPTOSIS: Primary | ICD-10-CM

## 2023-10-17 DIAGNOSIS — M20.11 VALGUS DEFORMITY OF BOTH GREAT TOES: ICD-10-CM

## 2023-10-17 PROCEDURE — 11721 DEBRIDE NAIL 6 OR MORE: CPT | Performed by: PODIATRIST

## 2023-10-17 PROCEDURE — 1160F RVW MEDS BY RX/DR IN RCRD: CPT | Performed by: PODIATRIST

## 2023-10-17 PROCEDURE — 1159F MED LIST DOCD IN RCRD: CPT | Performed by: PODIATRIST

## 2023-10-17 NOTE — PROGRESS NOTES
[facility] - PODIATRY    Today's Date: 10/17/23    Patient Name: Megha Hardy  MRN: [id number]  CSN: [id number]  PCP: Loretta Berry MD, Last PCP Visit: 5/25/2023  Referring Provider: No ref. provider found    SUBJECTIVE     Chief Complaint   Patient presents with    Left Foot - Follow-up, Nail Problem    Right Foot - Follow-up, Nail Problem     HPI: Megha Hardy, a 89 y.o.female, comes to clinic.    New, Established, New Problem:  established   Location:  Toenails  Duration:   Greater than five years  Onset:  Gradual  Nature:  sore with palpation.  Stable, worsening, improving:   Stable  Aggravating factors:  Pain with shoe gear and ambulation.  Previous Treatment:  debridement    Medical changes: no changes    Patient denies any fevers, chills, nausea, vomiting, shortness of breathe, nor any other constitutional signs nor symptoms.       Past Medical History:   Diagnosis Date    Ankle pain     Arthritis     Bladder disorder     Broken shoulder 1960    Bronchitis     Bunion     Cancer, skin, squamous cell     Cerumen impaction     CHF (congestive heart failure)     Condition not found     Ulcer    Corns and callus     Foot cramps     Foot pain, bilateral     Hallux valgus of left foot 03/15/2018    Hallux valgus of right foot 03/15/2018    Hammer toe     Head injury 1960    head injury from falling off back of truck    Hearing loss     Heart disease     High cholesterol     History of kidney problems     History of total knee arthroplasty, left 05/07/2018 11/03/2018    Hyperlipidemia     Hypertension     Ingrowing toenail     Kidney disease     Limb swelling     Nausea     Numbness in feet     Obesity     Primary osteoarthritis of left knee 09/11/2017    Shortness of breath     Sinus congestion     Tinea unguium      Past Surgical History:   Procedure Laterality Date    CORONARY ANGIOPLASTY WITH STENT PLACEMENT  2006    OTHER SURGICAL HISTORY      Artificial Joints/Limbs    SHOULDER  SURGERY Left 1963    SKIN CANCER EXCISION      TOTAL KNEE ARTHROPLASTY Left 1983    TOTAL KNEE ARTHROPLASTY Right 2004     Family History   Problem Relation Age of Onset    Arthritis Mother     Stroke Mother     Heart disease Mother     Diabetes Mother         Mellitus    Arthritis Father     Heart disease Father     Colon cancer Brother 50     Social History     Socioeconomic History    Marital status:    Tobacco Use    Smoking status: Never     Passive exposure: Past    Smokeless tobacco: Never   Vaping Use    Vaping Use: Never used   Substance and Sexual Activity    Alcohol use: Never    Drug use: Never    Sexual activity: Defer     Allergies   Allergen Reactions    Sulfa Antibiotics Rash     Current Outpatient Medications   Medication Sig Dispense Refill    amLODIPine (NORVASC) 10 MG tablet       aspirin 81 MG chewable tablet aspirin 81 mg oral tablet,chewable chew 1 tablet (81 mg) by oral route once daily   Suspended      atorvastatin (LIPITOR) 40 MG tablet       Calcium Carbonate-Vitamin D (Calcium-Vitamin D) 600-125 MG-UNIT tablet Calcium 600 + D(3) 600-125 mg-unit oral tablet take 1 tablet by oral route daily   Active      Cholecalciferol 125 MCG (5000 UT) tablet Vitamin D3 125 mcg (5,000 unit) oral tablet take 1 tablet by oral route daily   Active      famotidine (PEPCID) 20 MG tablet       furosemide (LASIX) 40 MG tablet 1qd      metoprolol succinate XL (TOPROL-XL) 25 MG 24 hr tablet       Multiple Vitamins-Minerals (PRESERVISION AREDS 2+MULTI VIT PO) PreserVision AREDS-2 199-309-16-1 mg-unit-mg-mg oral capsule take 1 capsule by oral route daily   Active      multivitamin with minerals tablet tablet 1qd      potassium chloride (K-DUR,KLOR-CON) 10 MEQ CR tablet 2 (Two) Times a Day.      azelastine (ASTELIN) 0.1 % nasal spray  (Patient not taking: Reported on 5/9/2023)      Diclofenac Sodium (VOLTAREN) 1 % gel gel Apply 4 g topically to the appropriate area as directed 4 (Four) Times a Day As Needed  (pain and swelling). (Patient not taking: Reported on 2023) 150 g 0    gabapentin (NEURONTIN) 300 MG capsule gabapentin 300 mg oral capsule take 1 capsule (300 mg) by oral route 1 times per day   Suspended (Patient not taking: Reported on 2023)      valsartan (DIOVAN) 40 MG tablet Diovan 40 mg oral tablet take 1 tablet by oral route daily   Active (Patient not taking: Reported on 2023)       No current facility-administered medications for this visit.     Review of Systems   Constitutional: Negative.    Skin:         Painful toenails.   All other systems reviewed and are negative.      OBJECTIVE     Vitals:    10/17/23 0853   BP: 169/81   Pulse: 69   Temp: 98.5 °F (36.9 °C)   SpO2: 94%         Patient seen in no apparent distress.      PHYSICAL EXAM:     Foot/Ankle Exam    GENERAL  Appearance:  elderly (Chronically ill)  Orientation:  AAOx3  Affect:  appropriate  Gait:  antalgic  Assistance:  cane use  Right shoe gear: casual shoe  Left shoe gear: casual shoe    VASCULAR     Right Foot Vascularity   Dorsalis pedis:  2+  Posterior tibial:  2+  Skin temperature:  warm  Edema gradin+ and pitting  CFT:  < 3 seconds  Pedal hair growth:  Absent  Varicosities:  severe varicosities     Left Foot Vascularity   Dorsalis pedis:  2+  Posterior tibial:  2+  Skin temperature:  warm  Edema grading:  Pitting and 2+  CFT:  < 3 seconds  Pedal hair growth:  Absent  Varicosities:  severe varicosities     NEUROLOGIC     Right Foot Neurologic   Normal sensation    Light touch sensation: normal  Vibratory sensation: normal  Hot/Cold sensation: normal  Protective Sensation using [place] Monofilament:   Sites intact: 10  Sites tested: 10     Left Foot Neurologic   Normal sensation    Light touch sensation: normal  Vibratory sensation: normal  Hot/Cold sensation:  normal  Protective Sensation using [place] Monofilament:   Sites intact: 10  Sites tested: 10    MUSCULOSKELETAL     Right Foot Musculoskeletal    Hammertoe:  Second toe and third toe  Hallux valgus: Yes       Left Foot Musculoskeletal   Hammertoe:  Second toe and third toe  Hallux valgus: Yes      MUSCLE STRENGTH     Right Foot Muscle Strength   Foot dorsiflexion:  4-  Foot plantar flexion:  4-  Foot inversion:  4-  Foot eversion:  4-     Left Foot Muscle Strength   Foot dorsiflexion:  4-  Foot plantar flexion:  4-  Foot inversion:  4-  Foot eversion:  4-    RANGE OF MOTION     Right Foot Range of Motion   Foot and ankle ROM within normal limits       Left Foot Range of Motion   Foot and ankle ROM within normal limits      DERMATOLOGIC      Right Foot Dermatologic   Skin  Right foot skin is intact.   Nails  1.  Positive for elongated, onychomycosis, abnormal thickness, subungual debris and ingrown toenail.  2.  Positive for elongated, onychomycosis, abnormal thickness, subungual debris and ingrown toenail.  3.  Positive for elongated, onychomycosis, abnormal thickness, subungual debris and ingrown toenail.  4.  Positive for elongated, onychomycosis, abnormal thickness, subungual debris and ingrown toenail.  5.  Positive for elongated, onychomycosis, abnormal thickness, subungual debris and ingrown toenail.     Left Foot Dermatologic   Skin  Left foot skin is intact.   Nails  1.  Positive for elongated, onychomycosis, abnormal thickness, subungual debris and ingrown toenail.  2.  Positive for elongated, onychomycosis, abnormal thickness, subungual debris and ingrown toenail.  3.  Positive for elongated, onychomycosis, abnormal thickness, subungual debris and ingrown toenail.  4.  Positive for elongated, onychomycosis, abnormally thick, subungual debris and ingrown toenail.  5.  Positive for elongated, onychomycosis, abnormally thick, subungual debris and ingrown toenail.      ASSESSMENT/PLAN     Diagnoses and all orders for this visit:    1. Onychocryptosis (Primary)    2. Valgus deformity of great toe, unspecified laterality    3. Valgus deformity of both  great toes    4. Foot pain, bilateral    5. Hammer toes of both feet    6. Difficulty walking        Comprehensive lower extremity examination and evaluation was performed.    Discussed findings and treatment plan including risks, benefits, and treatment options with patient in detail. Patient agreed with treatment plan.    Nails 1 through 5 bilaterally were debrided in thickness and length and then smoothed with a Dremel Tool.  Tolerated the procedure well without complications.    An After Visit Summary was printed and given to the patient at discharge, including (if requested) any available informative/educational handouts regarding diagnosis, treatment, or medications. All questions were answered to patient/family satisfaction. Should symptoms fail to improve or worsen they agree to call or return to clinic or to go to the Emergency Department. Discussed the importance of following up with any needed screening tests/labs/specialist appointments and any requested follow-up recommended by me today. Importance of maintaining follow-up discussed and patient accepts that missed appointments can delay diagnosis and potentially lead to worsening of conditions.    Return in about 9 weeks (around 12/19/2023) for Toenail Care., or sooner if acute issues arise.    This document has been electronically signed by Aidan Lundberg DPM on October 17, 2023 09:04 EDT

## 2024-01-09 ENCOUNTER — OFFICE VISIT (OUTPATIENT)
Dept: PODIATRY | Facility: CLINIC | Age: 89
End: 2024-01-09
Payer: MEDICARE

## 2024-01-09 VITALS
HEIGHT: 67 IN | WEIGHT: 174 LBS | OXYGEN SATURATION: 96 % | TEMPERATURE: 98.4 F | HEART RATE: 66 BPM | DIASTOLIC BLOOD PRESSURE: 76 MMHG | SYSTOLIC BLOOD PRESSURE: 153 MMHG | BODY MASS INDEX: 27.31 KG/M2

## 2024-01-09 DIAGNOSIS — M20.12 VALGUS DEFORMITY OF BOTH GREAT TOES: ICD-10-CM

## 2024-01-09 DIAGNOSIS — M20.11 VALGUS DEFORMITY OF BOTH GREAT TOES: ICD-10-CM

## 2024-01-09 DIAGNOSIS — L60.0 ONYCHOCRYPTOSIS: Primary | ICD-10-CM

## 2024-01-09 DIAGNOSIS — M20.10 VALGUS DEFORMITY OF GREAT TOE, UNSPECIFIED LATERALITY: ICD-10-CM

## 2024-01-09 DIAGNOSIS — M20.42 HAMMER TOES OF BOTH FEET: ICD-10-CM

## 2024-01-09 DIAGNOSIS — M20.41 HAMMER TOES OF BOTH FEET: ICD-10-CM

## 2024-01-09 DIAGNOSIS — B35.1 ONYCHOMYCOSIS: ICD-10-CM

## 2024-01-09 DIAGNOSIS — M79.672 FOOT PAIN, BILATERAL: ICD-10-CM

## 2024-01-09 DIAGNOSIS — M79.671 FOOT PAIN, BILATERAL: ICD-10-CM

## 2024-01-09 DIAGNOSIS — R26.2 DIFFICULTY WALKING: ICD-10-CM

## 2024-01-09 NOTE — PROGRESS NOTES
[facility] - PODIATRY    Today's Date: 01/09/24    Patient Name: Megha Hardy  MRN: [id number]  CSN: [id number]  PCP: Loretta Berry MD, Last PCP Visit: 1/2/2024  Referring Provider: No ref. provider found    SUBJECTIVE     Chief Complaint   Patient presents with    Left Foot - Follow-up, Nail Problem    Right Foot - Follow-up, Nail Problem     HPI: Megha Hardy, a 89 y.o.female, comes to clinic.    New, Established, New Problem:  established   Location:  Toenails  Duration:   Greater than five years  Onset:  Gradual  Nature:  sore with palpation.  Stable, worsening, improving:   Stable  Aggravating factors:  Pain with shoe gear and ambulation.  Previous Treatment:  debridement    Medical changes: none    Patient denies any fevers, chills, nausea, vomiting, shortness of breathe, nor any other constitutional signs nor symptoms.       Past Medical History:   Diagnosis Date    Ankle pain     Arthritis     Bladder disorder     Broken shoulder 1960    Bronchitis     Bunion     Cancer, skin, squamous cell     Cerumen impaction     CHF (congestive heart failure)     Condition not found     Ulcer    Corns and callus     Foot cramps     Foot pain, bilateral     Hallux valgus of left foot 03/15/2018    Hallux valgus of right foot 03/15/2018    Hammer toe     Head injury 1960    head injury from falling off back of truck    Hearing loss     Heart disease     High cholesterol     History of kidney problems     History of total knee arthroplasty, left 05/07/2018 11/03/2018    Hyperlipidemia     Hypertension     Ingrowing toenail     Kidney disease     Limb swelling     Nausea     Numbness in feet     Obesity     Primary osteoarthritis of left knee 09/11/2017    Shortness of breath     Sinus congestion     Tinea unguium      Past Surgical History:   Procedure Laterality Date    CORONARY ANGIOPLASTY WITH STENT PLACEMENT  2006    OTHER SURGICAL HISTORY      Artificial Joints/Limbs    SHOULDER SURGERY Left  1963    SKIN CANCER EXCISION      TOTAL KNEE ARTHROPLASTY Left 1983    TOTAL KNEE ARTHROPLASTY Right 2004     Family History   Problem Relation Age of Onset    Arthritis Mother     Stroke Mother     Heart disease Mother     Diabetes Mother         Mellitus    Arthritis Father     Heart disease Father     Colon cancer Brother 50     Social History     Socioeconomic History    Marital status:    Tobacco Use    Smoking status: Never     Passive exposure: Past    Smokeless tobacco: Never   Vaping Use    Vaping Use: Never used   Substance and Sexual Activity    Alcohol use: Never    Drug use: Never    Sexual activity: Defer     Allergies   Allergen Reactions    Sulfa Antibiotics Rash     Current Outpatient Medications   Medication Sig Dispense Refill    amLODIPine (NORVASC) 10 MG tablet       aspirin 81 MG chewable tablet aspirin 81 mg oral tablet,chewable chew 1 tablet (81 mg) by oral route once daily   Suspended      atorvastatin (LIPITOR) 40 MG tablet       azelastine (ASTELIN) 0.1 % nasal spray       Calcium Carbonate-Vitamin D (Calcium-Vitamin D) 600-125 MG-UNIT tablet Calcium 600 + D(3) 600-125 mg-unit oral tablet take 1 tablet by oral route daily   Active      Cholecalciferol 125 MCG (5000 UT) tablet Vitamin D3 125 mcg (5,000 unit) oral tablet take 1 tablet by oral route daily   Active      Diclofenac Sodium (VOLTAREN) 1 % gel gel Apply 4 g topically to the appropriate area as directed 4 (Four) Times a Day As Needed (pain and swelling). 150 g 0    famotidine (PEPCID) 20 MG tablet       furosemide (LASIX) 40 MG tablet 1qd      gabapentin (NEURONTIN) 300 MG capsule       metoprolol succinate XL (TOPROL-XL) 25 MG 24 hr tablet       Multiple Vitamins-Minerals (PRESERVISION AREDS 2+MULTI VIT PO) PreserVision AREDS-2 145-111-09-1 mg-unit-mg-mg oral capsule take 1 capsule by oral route daily   Active      multivitamin with minerals tablet tablet 1qd      potassium chloride (K-DUR,KLOR-CON) 10 MEQ CR tablet 2 (Two)  Times a Day.      valsartan (DIOVAN) 40 MG tablet        No current facility-administered medications for this visit.     Review of Systems   Constitutional: Negative.    Skin:         Painful toenails.   All other systems reviewed and are negative.      OBJECTIVE     Vitals:    24 0929   BP: 153/76   Pulse: 66   Temp: 98.4 °F (36.9 °C)   SpO2: 96%         Patient seen in no apparent distress.      PHYSICAL EXAM:     Foot/Ankle Exam    GENERAL  Appearance:  elderly (Chronically ill)  Orientation:  AAOx3  Affect:  appropriate  Gait:  antalgic  Assistance:  cane use  Right shoe gear: casual shoe  Left shoe gear: casual shoe    VASCULAR     Right Foot Vascularity   Dorsalis pedis:  2+  Posterior tibial:  2+  Skin temperature:  warm  Edema gradin+ and pitting  CFT:  < 3 seconds  Pedal hair growth:  Absent  Varicosities:  severe varicosities     Left Foot Vascularity   Dorsalis pedis:  2+  Posterior tibial:  2+  Skin temperature:  warm  Edema grading:  Pitting and 2+  CFT:  < 3 seconds  Pedal hair growth:  Absent  Varicosities:  severe varicosities     NEUROLOGIC     Right Foot Neurologic   Normal sensation    Light touch sensation: normal  Vibratory sensation: normal  Hot/Cold sensation: normal  Protective Sensation using [place] Monofilament:   Sites intact: 10  Sites tested: 10     Left Foot Neurologic   Normal sensation    Light touch sensation: normal  Vibratory sensation: normal  Hot/Cold sensation:  normal  Protective Sensation using [place] Monofilament:   Sites intact: 10  Sites tested: 10    MUSCULOSKELETAL     Right Foot Musculoskeletal   Hammertoe:  Second toe and third toe  Hallux valgus: Yes       Left Foot Musculoskeletal   Hammertoe:  Second toe and third toe  Hallux valgus: Yes      MUSCLE STRENGTH     Right Foot Muscle Strength   Foot dorsiflexion:  4-  Foot plantar flexion:  4-  Foot inversion:  4-  Foot eversion:  4-     Left Foot Muscle Strength   Foot dorsiflexion:   4-  Foot plantar flexion:  4-  Foot inversion:  4-  Foot eversion:  4-    RANGE OF MOTION     Right Foot Range of Motion   Foot and ankle ROM within normal limits       Left Foot Range of Motion   Foot and ankle ROM within normal limits      DERMATOLOGIC      Right Foot Dermatologic   Skin  Right foot skin is intact.   Nails  1.  Positive for elongated, onychomycosis, abnormal thickness, subungual debris and ingrown toenail.  2.  Positive for elongated, onychomycosis, abnormal thickness, subungual debris and ingrown toenail.  3.  Positive for elongated, onychomycosis, abnormal thickness, subungual debris and ingrown toenail.  4.  Positive for elongated, onychomycosis, abnormal thickness, subungual debris and ingrown toenail.  5.  Positive for elongated, onychomycosis, abnormal thickness, subungual debris and ingrown toenail.     Left Foot Dermatologic   Skin  Left foot skin is intact.   Nails  1.  Positive for elongated, onychomycosis, abnormal thickness, subungual debris and ingrown toenail.  2.  Positive for elongated, onychomycosis, abnormal thickness, subungual debris and ingrown toenail.  3.  Positive for elongated, onychomycosis, abnormal thickness, subungual debris and ingrown toenail.  4.  Positive for elongated, onychomycosis, abnormally thick, subungual debris and ingrown toenail.  5.  Positive for elongated, onychomycosis, abnormally thick, subungual debris and ingrown toenail.      ASSESSMENT/PLAN     Diagnoses and all orders for this visit:    1. Onychocryptosis (Primary)    2. Valgus deformity of great toe, unspecified laterality    3. Valgus deformity of both great toes    4. Foot pain, bilateral    5. Onychomycosis    6. Difficulty walking    7. Hammer toes of both feet          Comprehensive lower extremity examination and evaluation was performed.    Discussed findings and treatment plan including risks, benefits, and treatment options with patient in detail. Patient agreed with treatment  plan.    Nails 1 through 5 bilaterally were debrided in thickness and length and then smoothed with a Dremel Tool.  Tolerated the procedure well without complications.    An After Visit Summary was printed and given to the patient at discharge, including (if requested) any available informative/educational handouts regarding diagnosis, treatment, or medications. All questions were answered to patient/family satisfaction. Should symptoms fail to improve or worsen they agree to call or return to clinic or to go to the Emergency Department. Discussed the importance of following up with any needed screening tests/labs/specialist appointments and any requested follow-up recommended by me today. Importance of maintaining follow-up discussed and patient accepts that missed appointments can delay diagnosis and potentially lead to worsening of conditions.    Return in about 9 weeks (around 3/12/2024) for Toenail Care., or sooner if acute issues arise.    This document has been electronically signed by Aidan Lundberg DPM on January 9, 2024 09:33 EST

## 2024-01-27 ENCOUNTER — HOSPITAL ENCOUNTER (EMERGENCY)
Facility: HOSPITAL | Age: 89
Discharge: HOME | End: 2024-01-27
Attending: EMERGENCY MEDICINE
Payer: MEDICARE

## 2024-01-27 VITALS
OXYGEN SATURATION: 98 % | SYSTOLIC BLOOD PRESSURE: 139 MMHG | DIASTOLIC BLOOD PRESSURE: 69 MMHG | TEMPERATURE: 98.4 F | WEIGHT: 177.69 LBS | BODY MASS INDEX: 27.89 KG/M2 | HEIGHT: 67 IN | RESPIRATION RATE: 18 BRPM | HEART RATE: 61 BPM

## 2024-01-27 DIAGNOSIS — R04.0 EPISTAXIS: Primary | ICD-10-CM

## 2024-01-27 PROCEDURE — 99283 EMERGENCY DEPT VISIT LOW MDM: CPT

## 2024-01-27 NOTE — ED PROVIDER NOTES
Time: 9:34 AM EST  Date of encounter:  1/27/2024  Room number: 13/13  Independent Historian/Clinical History and Information was obtained by:   Patient    History is limited by: N/A    Chief Complaint: nosebleed      History of Present Illness:  Patient is a 89 y.o. year old female who presents to the emergency department for evaluation of intermittent nosebleeds.  Patient states that almost every night for the past 3 days she has had a nosebleed.  She denies taking blood thinners but does take an aspirin a day.  She also denies any recent trauma.  She saw her PCP yesterday for nosebleeds and he encouraged her to use saline nasal spray at night as well as apply Vaseline in the nadirs when bleeding nares if bleeding returns.     HPI    Patient Care Team  Primary Care Provider: Loretta Berry MD    Past Medical History:     Allergies   Allergen Reactions    Sulfa Antibiotics Rash     Past Medical History:   Diagnosis Date    Ankle pain     Arthritis     Bladder disorder     Broken shoulder 1960    Bronchitis     Bunion     Cancer, skin, squamous cell     Cerumen impaction     CHF (congestive heart failure)     Condition not found     Ulcer    Corns and callus     Foot cramps     Foot pain, bilateral     Hallux valgus of left foot 03/15/2018    Hallux valgus of right foot 03/15/2018    Hammer toe     Head injury 1960    head injury from falling off back of truck    Hearing loss     Heart disease     High cholesterol     History of kidney problems     History of total knee arthroplasty, left 05/07/2018 11/03/2018    Hyperlipidemia     Hypertension     Ingrowing toenail     Kidney disease     Limb swelling     Nausea     Numbness in feet     Obesity     Primary osteoarthritis of left knee 09/11/2017    Shortness of breath     Sinus congestion     Tinea unguium      Past Surgical History:   Procedure Laterality Date    CORONARY ANGIOPLASTY WITH STENT PLACEMENT  2006    OTHER SURGICAL HISTORY      Artificial  Joints/Limbs    SHOULDER SURGERY Left 1963    SKIN CANCER EXCISION      TOTAL KNEE ARTHROPLASTY Left 1983    TOTAL KNEE ARTHROPLASTY Right 2004     Family History   Problem Relation Age of Onset    Arthritis Mother     Stroke Mother     Heart disease Mother     Diabetes Mother         Mellitus    Arthritis Father     Heart disease Father     Colon cancer Brother 50       Home Medications:  Prior to Admission medications    Medication Sig Start Date End Date Taking? Authorizing Provider   amLODIPine (NORVASC) 10 MG tablet  4/26/22   Kayleen Barboza MD   aspirin 81 MG chewable tablet aspirin 81 mg oral tablet,chewable chew 1 tablet (81 mg) by oral route once daily   Suspended    Kayleen Barboza MD   atorvastatin (LIPITOR) 40 MG tablet  6/8/21   Kayleen Barboza MD   azelastine (ASTELIN) 0.1 % nasal spray  6/23/21   Kayleen Barboza MD   Calcium Carbonate-Vitamin D (Calcium-Vitamin D) 600-125 MG-UNIT tablet Calcium 600 + D(3) 600-125 mg-unit oral tablet take 1 tablet by oral route daily   Active    Kayleen Barboza MD   Cholecalciferol 125 MCG (5000 UT) tablet Vitamin D3 125 mcg (5,000 unit) oral tablet take 1 tablet by oral route daily   Active    Kayleen Barboza MD   Diclofenac Sodium (VOLTAREN) 1 % gel gel Apply 4 g topically to the appropriate area as directed 4 (Four) Times a Day As Needed (pain and swelling). 7/18/22   Ester Jimenes APRN   famotidine (PEPCID) 20 MG tablet  1/25/22   Kayleen Barboza MD   furosemide (LASIX) 40 MG tablet 1qd 12/6/22   Kayleen Barboza MD   gabapentin (NEURONTIN) 300 MG capsule     Kayleen Barboza MD   metoprolol succinate XL (TOPROL-XL) 25 MG 24 hr tablet  6/23/21   Kayleen Barboza MD   Multiple Vitamins-Minerals (PRESERVISION AREDS 2+MULTI VIT PO) PreserVision AREDS-2 405-953-53-1 mg-unit-mg-mg oral capsule take 1 capsule by oral route daily   Active    Kayleen Barboza MD   multivitamin with minerals tablet  "tablet 1qd    Kayleen Barboza MD   potassium chloride (K-DUR,KLOR-CON) 10 MEQ CR tablet 2 (Two) Times a Day. 4/26/22   Kayleen Barboza MD   valsartan (DIOVAN) 40 MG tablet     Kayleen Barboza MD        Social History:   Social History     Tobacco Use    Smoking status: Never     Passive exposure: Past    Smokeless tobacco: Never   Vaping Use    Vaping Use: Never used   Substance Use Topics    Alcohol use: Never    Drug use: Never         Review of Systems:  Review of Systems   Constitutional:  Negative for chills and fever.   HENT:  Positive for nosebleeds (currently resolved). Negative for congestion, ear pain and sore throat.    Eyes:  Negative for pain.   Respiratory:  Negative for cough, chest tightness and shortness of breath.    Cardiovascular:  Negative for chest pain.   Gastrointestinal:  Negative for abdominal pain, diarrhea, nausea and vomiting.   Genitourinary:  Negative for flank pain and hematuria.   Musculoskeletal:  Negative for joint swelling.   Skin:  Negative for pallor.   Neurological:  Negative for seizures and headaches.   All other systems reviewed and are negative.       Physical Exam:  /69 (BP Location: Left arm, Patient Position: Sitting)   Pulse 61   Temp 98.4 °F (36.9 °C)   Resp 18   Ht 170.2 cm (67\")   Wt 80.6 kg (177 lb 11.1 oz)   SpO2 98%   BMI 27.83 kg/m²     Physical Exam  Vitals and nursing note reviewed.   Constitutional:       General: She is not in acute distress.     Appearance: Normal appearance. She is not ill-appearing, toxic-appearing or diaphoretic.   HENT:      Head: Normocephalic and atraumatic.      Mouth/Throat:      Mouth: Mucous membranes are moist.   Eyes:      General: No scleral icterus.  Cardiovascular:      Rate and Rhythm: Normal rate and regular rhythm.      Pulses: Normal pulses.      Heart sounds: Normal heart sounds.   Pulmonary:      Effort: Pulmonary effort is normal. No respiratory distress.      Breath sounds: Normal breath " sounds. No stridor. No wheezing or rhonchi.   Abdominal:      General: Abdomen is flat.      Palpations: Abdomen is soft.      Tenderness: There is no abdominal tenderness.   Musculoskeletal:         General: No swelling, tenderness, deformity or signs of injury. Normal range of motion.      Cervical back: Normal range of motion and neck supple.   Skin:     General: Skin is warm and dry.      Coloration: Skin is not jaundiced or pale.      Findings: No bruising or erythema.   Neurological:      Mental Status: She is alert and oriented to person, place, and time. Mental status is at baseline.                  Procedures:  Procedures      Medical Decision Making:      Comorbidities that affect care:    Hypertension, kidney disease, bladder disorder, hyperlipidemia, hearing loss, arthritis, osteoporosis, sinus congestion    External Notes reviewed:    Previous Clinic Note: Reviewed Dr. Lundberg's note from 1/9/2024, podiatry.      The following orders were placed and all results were independently analyzed by me:  Orders Placed This Encounter   Procedures    Ambulatory Referral to ENT (Otolaryngology)    Please have pt blow nose to assess for bleeding. If bleeding starts, please spray Petar-Synephrine in each nare and clamp with nasal clamp for 15 to 20 minutes.  Misc Nursing Order (Specify)    Please supply to with a couple of nasal clamps  Misc Nursing Order (Specify)       Medications Given in the Emergency Department:  Medications - No data to display       ED Course:    ED Course as of 01/28/24 0708   Sat Jan 27, 2024   1139 Primary RN advises that patient has blow to her nose and there is no active bleeding.  Will continue to monitor [MS]   1251 Pt has had no bleeding since arriving in the ER and her airway is patent.  She advises that she has seen Dr. Dean in the past and can follow-up with him. She, along with family at bedside, were given instructions as to how to proceed if the bleeding returns. She was  encouraged to limit her use of the astelin nose spray due to the fact it can cause excessive drying which can contribute to nose bleeds. She added that she noticed her nosebleeds starting after she started using the medication.  [MS]      ED Course User Index  [MS] Megha Velásquez, MELVIN       Labs:    Lab Results (last 24 hours)       ** No results found for the last 24 hours. **             Imaging:    No Radiology Exams Resulted Within Past 24 Hours      Differential Diagnosis and Discussion:    Epistaxis: Differential diagnosis includes but is not limited to trauma, environmental exposure, coagulopathy, allergic, infectious, hypertension, foreign bodies, hormonal, and tumors.        MDM  Number of Diagnoses or Management Options  Epistaxis: new and does not require workup     Amount and/or Complexity of Data Reviewed  Review and summarize past medical records: yes (I have personally reviewed patient's previous medical encounters.  )    Risk of Complications, Morbidity, and/or Mortality  Presenting problems: low  Management options: low    Patient Progress  Patient progress: stable                 Patient Care Considerations:    CONSULT: I considered consulting ENT, however pt had no active bleeding while in the ED. Additionally, she is established with an ENT whom she will follow-up with.       Consultants/Shared Management Plan:    None    Social Determinants of Health:    Patient has presented with family members who are responsible, reliable and will ensure follow up care.      Disposition and Care Coordination:    Discharged: The patient is suitable and stable for discharge with no need for consideration of admission.    I have explained the patient´s condition, diagnoses and treatment plan based on the information available to me at this time. I have answered questions and addressed any concerns. The patient has a good  understanding of the patient´s diagnosis, condition, and treatment plan as can be  expected at this point. The vital signs have been stable. The patient´s condition is stable and appropriate for discharge from the emergency department.      The patient will pursue further outpatient evaluation with the primary care physician or other designated or consulting physician as outlined in the discharge instructions. They are agreeable to this plan of care and follow-up instructions have been explained in detail. The patient has received these instructions in written format and have expressed an understanding of the discharge instructions. The patient is aware that any significant change in condition or worsening of symptoms should prompt an immediate return to this or the closest emergency department or call to 911.    Final diagnoses:   Epistaxis        ED Disposition       ED Disposition   Discharge    Condition   Stable    Comment   --               This medical record created using voice recognition software.       Megha Velásquez APRN  01/28/24 0706       Megha Velásquez APRN  01/28/24 0708

## 2024-01-27 NOTE — DISCHARGE INSTRUCTIONS
Please purchase some over-the-counter Petar-Synephrine nose spray.  If you do not see it on that the shelf please ask your pharmacist.  Sometimes it is kept behind the pharmacist counter.  If your nose starts to bleed please spray 1 spray of the medication into each nostril and then clamp with the clamp that has been provided for you here today.  Leave the clamp in place for 15 to 20 minutes and then removed to reassess for bleeding.  If you continue to bleed you may repeat the process 1 other time.  After total of 2 times if you continue to bleed please call your ENT, Dr. Mensah, or report to the closest emergency department

## 2024-01-29 ENCOUNTER — TELEPHONE (OUTPATIENT)
Dept: OTOLARYNGOLOGY | Facility: CLINIC | Age: 89
End: 2024-01-29

## 2024-01-29 NOTE — TELEPHONE ENCOUNTER
Caller: NORA PEÑA     Relationship: POA    Best call back number:  [phone]    What is the best time to reach you: ASAP    Who are you requesting to speak with (clinical staff, provider,  specific staff member): CLINICAL    What was the call regarding:  PT'S POA [place] CALLED STATING THAT PT WAS SEEN IN THE ER FOR A NOSE BLEED AND IS WANTING TO SPEAK WITH DR. PATE NURSE TO SEE IF SHE NEEDS TO FOLLOW UP WITH HIM

## 2024-01-29 NOTE — TELEPHONE ENCOUNTER
LM WITH APPOINTMENT DATE AND TIME AT PHONE NUMBER LISTED ABOVE AND TO CALL OFFICE WITH ANY QUESTIONS OR CONCERNS THANKS

## 2024-02-09 ENCOUNTER — LAB REQUISITION (OUTPATIENT)
Dept: LAB | Facility: HOSPITAL | Age: 89
End: 2024-02-09
Payer: MEDICARE

## 2024-02-09 DIAGNOSIS — D64.9 ANEMIA, UNSPECIFIED: ICD-10-CM

## 2024-02-09 DIAGNOSIS — R53.1 WEAKNESS: ICD-10-CM

## 2024-02-09 DIAGNOSIS — I50.9 HEART FAILURE, UNSPECIFIED: ICD-10-CM

## 2024-02-09 LAB — HEMOCCULT STL QL IA: NEGATIVE

## 2024-02-09 PROCEDURE — 82274 ASSAY TEST FOR BLOOD FECAL: CPT | Performed by: NURSE PRACTITIONER

## 2024-02-15 ENCOUNTER — OFFICE VISIT (OUTPATIENT)
Dept: OTOLARYNGOLOGY | Facility: CLINIC | Age: 89
End: 2024-02-15
Payer: MEDICARE

## 2024-02-15 VITALS — BODY MASS INDEX: 27.78 KG/M2 | HEIGHT: 67 IN | WEIGHT: 177 LBS | TEMPERATURE: 97.7 F

## 2024-02-15 DIAGNOSIS — R04.0 NASAL BLEEDING: Primary | ICD-10-CM

## 2024-02-15 RX ORDER — MULTIVITAMIN
1 TABLET ORAL DAILY
COMMUNITY
Start: 2024-02-02

## 2024-02-15 RX ORDER — CALCIUM CARBONATE/VITAMIN D3 600 MG-10
1 TABLET ORAL DAILY
COMMUNITY
Start: 2024-02-02

## 2024-02-20 ENCOUNTER — OFFICE VISIT (OUTPATIENT)
Dept: OTOLARYNGOLOGY | Facility: CLINIC | Age: 89
End: 2024-02-20
Payer: MEDICARE

## 2024-02-20 VITALS — TEMPERATURE: 97.8 F | BODY MASS INDEX: 27.78 KG/M2 | HEIGHT: 67 IN | WEIGHT: 177 LBS

## 2024-02-20 DIAGNOSIS — R04.0 NASAL BLEEDING: Primary | ICD-10-CM

## 2024-02-20 NOTE — PROGRESS NOTES
Patient Name: Megha Hardy   Visit Date: 02/20/2024   Patient ID: [id number]  Provider: Sekou Hood MD    Sex: female  Location: [facility]   YOB: 1930  Location Address: [address] [place] [address], [address] [place] [address],?[place]?[id number]    Primary Care Provider Loretta Berry MD  Location Phone: [phone]    Referring Provider: No ref. provider found        Chief Complaint  Follow-up and Nose Bleed (REMOVAL OF PACKING)    Subjective    History of Present Illness  Megha Hardy is a 89 y.o. female who presents to [facility] today as a consult from No ref. provider found.    She presents to the clinic today for follow-up regarding epistaxis.  She had a severe nosebleed last week which required nasal packing to be placed here in the clinic.  After I placed the packing the bleeding did subside and she informs me that she has done well outside of the discomfort of having packing in her nose.  She has had no further bleeding.  She has held her aspirin.  Denies any postnasal drainage.    Past Medical History:   Diagnosis Date    Ankle pain     Arthritis     Bladder disorder     Broken shoulder 1960    Bronchitis     Bunion     Cancer, skin, squamous cell     Cerumen impaction     CHF (congestive heart failure)     Condition not found     Ulcer    Corns and callus     Foot cramps     Foot pain, bilateral     Hallux valgus of left foot 03/15/2018    Hallux valgus of right foot 03/15/2018    Hammer toe     Head injury 1960    head injury from falling off back of truck    Hearing loss     Heart disease     High cholesterol     History of kidney problems     History of total knee arthroplasty, left 05/07/2018 11/03/2018    Hyperlipidemia     Hypertension     Ingrowing toenail     Kidney disease     Limb swelling     Nausea     Numbness in feet     Obesity     Primary osteoarthritis of left knee 09/11/2017    Shortness of breath     Sinus congestion      Tinea unguium        Past Surgical History:   Procedure Laterality Date    CORONARY ANGIOPLASTY WITH STENT PLACEMENT  2006    OTHER SURGICAL HISTORY      Artificial Joints/Limbs    SHOULDER SURGERY Left 1963    SKIN CANCER EXCISION      TOTAL KNEE ARTHROPLASTY Left 1983    TOTAL KNEE ARTHROPLASTY Right 2004         Current Outpatient Medications:     amLODIPine (NORVASC) 10 MG tablet, , Disp: , Rfl:     atorvastatin (LIPITOR) 40 MG tablet, , Disp: , Rfl:     Calcium + Vitamin D3 600-10 MG-MCG tablet per tablet, Take 1 tablet by mouth Daily., Disp: , Rfl:     Calcium Carbonate-Vitamin D (Calcium-Vitamin D) 600-125 MG-UNIT tablet, Calcium 600 + D(3) 600-125 mg-unit oral tablet take 1 tablet by oral route daily   Active, Disp: , Rfl:     Cholecalciferol 125 MCG (5000 UT) tablet, Vitamin D3 125 mcg (5,000 unit) oral tablet take 1 tablet by oral route daily   Active, Disp: , Rfl:     Diclofenac Sodium (VOLTAREN) 1 % gel gel, Apply 4 g topically to the appropriate area as directed 4 (Four) Times a Day As Needed (pain and swelling)., Disp: 150 g, Rfl: 0    famotidine (PEPCID) 20 MG tablet, , Disp: , Rfl:     furosemide (LASIX) 40 MG tablet, 1qd, Disp: , Rfl:     metoprolol succinate XL (TOPROL-XL) 25 MG 24 hr tablet, , Disp: , Rfl:     Multiple Vitamins-Minerals (PRESERVISION AREDS 2+MULTI VIT PO), PreserVision AREDS-2 005-452-78-1 mg-unit-mg-mg oral capsule take 1 capsule by oral route daily   Active, Disp: , Rfl:     Multivitamin tablet tablet, Take 1 tablet by mouth Daily., Disp: , Rfl:     multivitamin with minerals tablet tablet, 1qd, Disp: , Rfl:     potassium chloride (K-DUR,KLOR-CON) 10 MEQ CR tablet, 2 (Two) Times a Day., Disp: , Rfl:     valsartan (DIOVAN) 40 MG tablet, , Disp: , Rfl:     aspirin 81 MG chewable tablet, aspirin 81 mg oral tablet,chewable chew 1 tablet (81 mg) by oral route once daily   Suspended (Patient not taking: Reported on 2/20/2024), Disp: , Rfl:     azelastine (ASTELIN) 0.1 % nasal spray, ,  "Disp: , Rfl:     gabapentin (NEURONTIN) 300 MG capsule, , Disp: , Rfl:      Allergies   Allergen Reactions    Sulfa Antibiotics Rash       Family History   Problem Relation Age of Onset    Arthritis Mother     Stroke Mother     Heart disease Mother     Diabetes Mother         Mellitus    Arthritis Father     Heart disease Father     Colon cancer Brother 50        Social History     Social History Narrative    Not on file       Objective     Vital Signs:   Temp 97.8 °F (36.6 °C) (Tympanic)   Ht 170.2 cm (67\")   Wt 80.3 kg (177 lb)   BMI 27.72 kg/m²       Physical Exam    Constitutional   Appearance  : well developed, well-nourished, alert and in no acute distress, voice clear and strong    Head  Inspection  : no deformities or lesions  Face  Inspection  : No facial lesions; House-Brackmann I/VI bilaterally  Palpation  : No TMJ crepitus nor  muscle tenderness bilaterally    Eyes  Vision  Visual Fields  : Extraocular movements are intact. No spontaneous or gaze-induced nystagmus.  Conjunctivae  : clear  Sclerae  : clear  Pupils and Irises  : pupils equal, round, and reactive to light.     Ears, Nose, Mouth and Throat    Ears    External Ears  : appearance within normal limits, no lesions present  Otoscopic Examination  : Tympanic membrane appearance within normal limits bilaterally without perforations, well-aerated middle ears  Hearing  : intact to conversational voice both ears  Tunning fork testing:     :    Nose    External Nose  : appearance normal  Intranasal Exam  : Nasal pack removed from the left nostril, mucosa well-healing, silver nitrate was used to treat superficial blood vessels along the nasal septum, vestibules normal, no intranasal lesions present, septum midline, sinuses non tender to percussion  Oral Cavity    Oral Mucosa  : oral mucosa normal without pallor or cyanosis  Lips  : lip appearance normal  Teeth  : normal dentition for age  Gums  : gums pink, non-swollen, no bleeding " present  Tongue  : tongue appearance normal; normal mobility  Palate  : hard palate normal, soft palate appearance normal with symmetric mobility    Throat    Oropharynx  : no inflammation or lesions present, tonsils within normal limits  Hypopharynx  : appearance within normal limits, superior epiglottis within normal limits  Larynx  : appearance within normal limits, vocal cords within normal limits, no lesions present    Neck  Inspection/Palpation  : normal appearance, no masses or tenderness, trachea midline; thyroid size normal, nontender, no nodules or masses present on palpation    Respiratory  Respiratory Effort  : breathing unlabored  Inspection of Chest  : normal appearance, no retractions    Cardiovascular  Heart  : regular rate and rhythm    Lymphatic  Neck  : no lymphadenopathy present  Supraclavicular Nodes  : no lymphadenopathy present  Preauricular Nodes  : no lymphadenopathy present    Skin and Subcutaneous Tissue  General Inspection  : Regarding face and neck - there are no rashes present, no lesions present, and no areas of discoloration    Neurologic  Cranial Nerves  : cranial nerves II-XII are grossly intact bilaterally  Gait and Station  : normal gait, able to stand without diffculty    Psychiatric  Judgement and Insight  : judgment and insight intact  Mood and Affect  : mood normal, affect appropriate     Control nasal hemorrhage (anterior, complex)    Date/Time: 2/20/2024 10:50 AM    Performed by: Sekou Hood MD  Authorized by: Sekou Hood MD    Consent:     Consent obtained:  Verbal    Consent given by:  Patient    Risks discussed:  Bleeding and pain    Alternatives discussed:  Alternative treatment  Anesthesia (see MAR for exact dosages):     Anesthesia method:  None  Procedure details:     Visualization: speculum      Treatment site:  L anterior    Treatment method:  Silver nitrate    Treatment complexity:  Extensive  Post-procedure details:     Assessment:  Bleeding  stopped    Patient tolerance of procedure:  Tolerated well          Assessment and Plan    Diagnoses and all orders for this visit:    1. Nasal bleeding (Primary)    Other orders  -     $ Control of Epistaxis    Examination today revealed no further bleeding.  I was able to remove the nasal pack.  There were some areas of superficial blood vessels along the nasal septum which I treated with silver nitrate after discussing options with him today.  I will have her keep holding her aspirin for another week and recommended intranasal Vaseline to be done several times per day to help with moisturization and allow for proper healing.  We discussed proper techniques of stopping a nosebleed.  I will see her back on an as-needed basis should she have any continued issues with this.    Follow Up   No follow-ups on file.  Patient was given instructions and counseling regarding her condition or for health maintenance advice. Please see specific information pulled into the AVS if appropriate.

## 2024-03-06 ENCOUNTER — TELEPHONE (OUTPATIENT)
Dept: CARDIOLOGY | Facility: CLINIC | Age: 89
End: 2024-03-06
Payer: MEDICARE

## 2024-03-06 NOTE — TELEPHONE ENCOUNTER
Received VM from patient nephshen Raz.     NIRANJAN Crandall. Per Raz patient has been seeing Dr Hood in [facility] to nose bleed. Dr Hood placed packing on 2/15 and advised to hold Aspirin. On 2/20 packing was removed and patient was advised to hold Aspirin 1 more week and to apply intranasal vaseline. Per lisa patient has been following instructions. Patient has another nose bleed yesterday that was bad. Per lisa they called and spoke to Dr Hood and he advised them to call cardiology  in regards to the Aspirin    Advised nephew I would return call with recommendations

## 2024-03-21 ENCOUNTER — OFFICE VISIT (OUTPATIENT)
Dept: CARDIOLOGY | Facility: CLINIC | Age: 89
End: 2024-03-21
Payer: MEDICARE

## 2024-03-21 VITALS
BODY MASS INDEX: 26.53 KG/M2 | DIASTOLIC BLOOD PRESSURE: 59 MMHG | SYSTOLIC BLOOD PRESSURE: 149 MMHG | HEART RATE: 56 BPM | WEIGHT: 169 LBS | HEIGHT: 67 IN

## 2024-03-21 DIAGNOSIS — Z98.61 CAD S/P PERCUTANEOUS CORONARY ANGIOPLASTY: Primary | ICD-10-CM

## 2024-03-21 DIAGNOSIS — E78.5 HYPERLIPIDEMIA LDL GOAL <70: ICD-10-CM

## 2024-03-21 DIAGNOSIS — I25.10 CAD S/P PERCUTANEOUS CORONARY ANGIOPLASTY: Primary | ICD-10-CM

## 2024-03-21 DIAGNOSIS — I10 ESSENTIAL HYPERTENSION: ICD-10-CM

## 2024-03-21 DIAGNOSIS — I50.32 CHRONIC HEART FAILURE WITH PRESERVED EJECTION FRACTION (HFPEF): ICD-10-CM

## 2024-03-21 PROBLEM — E66.9 OBESITY: Status: RESOLVED | Noted: 2023-04-25 | Resolved: 2024-03-21

## 2024-03-21 PROBLEM — G47.00 INSOMNIA: Status: ACTIVE | Noted: 2024-03-21

## 2024-03-21 PROBLEM — K44.9 HIATAL HERNIA WITH GERD: Status: ACTIVE | Noted: 2024-03-21

## 2024-03-21 PROBLEM — E53.8 VITAMIN B12 DEFICIENCY DISEASE: Status: ACTIVE | Noted: 2024-03-21

## 2024-03-21 PROBLEM — G62.9 NEUROPATHY: Status: ACTIVE | Noted: 2024-03-21

## 2024-03-21 PROBLEM — M79.89 LIMB SWELLING: Status: RESOLVED | Noted: 2023-04-25 | Resolved: 2024-03-21

## 2024-03-21 PROBLEM — R09.81 SINUS CONGESTION: Status: RESOLVED | Noted: 2023-04-25 | Resolved: 2024-03-21

## 2024-03-21 PROBLEM — B96.89 ACUTE BACTERIAL SIALADENITIS: Status: RESOLVED | Noted: 2023-05-09 | Resolved: 2024-03-21

## 2024-03-21 PROBLEM — F43.29 PROLONGED GRIEF REACTION: Status: ACTIVE | Noted: 2024-03-21

## 2024-03-21 PROBLEM — M65.949 TENOSYNOVITIS OF FINGER: Status: ACTIVE | Noted: 2024-03-21

## 2024-03-21 PROBLEM — E55.9 VITAMIN D DEFICIENCY: Status: ACTIVE | Noted: 2024-03-21

## 2024-03-21 PROBLEM — K21.9 HIATAL HERNIA WITH GERD: Status: ACTIVE | Noted: 2024-03-21

## 2024-03-21 PROBLEM — M65.9 TENOSYNOVITIS OF FINGER: Status: ACTIVE | Noted: 2024-03-21

## 2024-03-21 PROBLEM — N32.9 BLADDER DISORDER: Status: RESOLVED | Noted: 2023-04-25 | Resolved: 2024-03-21

## 2024-03-21 PROBLEM — F09 MILD COGNITIVE DISORDER: Status: ACTIVE | Noted: 2024-03-21

## 2024-03-21 PROBLEM — S66.819A: Status: ACTIVE | Noted: 2024-03-21

## 2024-03-21 PROBLEM — K11.21 ACUTE BACTERIAL SIALADENITIS: Status: RESOLVED | Noted: 2023-05-09 | Resolved: 2024-03-21

## 2024-03-21 PROBLEM — Z66 DNR NO CODE (DO NOT RESUSCITATE): Status: ACTIVE | Noted: 2024-03-21

## 2024-03-21 PROBLEM — R11.0 NAUSEA: Status: RESOLVED | Noted: 2023-04-25 | Resolved: 2024-03-21

## 2024-03-21 PROBLEM — I87.8 VENOUS STASIS SYNDROME: Status: ACTIVE | Noted: 2024-03-21

## 2024-03-21 PROBLEM — Z95.5 PRESENCE OF BARE METAL STENT IN LAD CORONARY ARTERY: Status: RESOLVED | Noted: 2024-03-21 | Resolved: 2024-03-21

## 2024-03-21 PROBLEM — G56.03 CARPAL TUNNEL SYNDROME ON BOTH SIDES: Status: ACTIVE | Noted: 2024-03-21

## 2024-03-21 PROBLEM — I34.0 MITRAL REGURGITATION: Status: RESOLVED | Noted: 2024-03-21 | Resolved: 2024-03-21

## 2024-03-21 PROBLEM — R73.03 PREDIABETES: Status: ACTIVE | Noted: 2024-03-21

## 2024-03-21 PROBLEM — H61.23 BILATERAL IMPACTED CERUMEN: Status: RESOLVED | Noted: 2023-05-09 | Resolved: 2024-03-21

## 2024-03-21 PROBLEM — M25.579 ANKLE PAIN: Status: RESOLVED | Noted: 2023-04-25 | Resolved: 2024-03-21

## 2024-03-21 PROBLEM — R06.02 SHORTNESS OF BREATH: Status: RESOLVED | Noted: 2023-04-25 | Resolved: 2024-03-21

## 2024-03-21 PROBLEM — I34.0 MITRAL REGURGITATION: Status: ACTIVE | Noted: 2024-03-21

## 2024-03-21 PROBLEM — M81.0 OSTEOPOROSIS: Status: ACTIVE | Noted: 2024-03-21

## 2024-03-21 PROBLEM — R04.0 NASAL BLEEDING: Status: RESOLVED | Noted: 2024-02-15 | Resolved: 2024-03-21

## 2024-03-21 PROBLEM — S42.309A FRACTURE OF UPPER EXTREMITY: Status: RESOLVED | Noted: 2023-04-25 | Resolved: 2024-03-21

## 2024-03-21 PROBLEM — Z95.5 PRESENCE OF BARE METAL STENT IN LAD CORONARY ARTERY: Status: ACTIVE | Noted: 2024-03-21

## 2024-03-21 NOTE — PROGRESS NOTES
Chief Complaint  Follow-up    Subjective            History of Present Illness  Megha Hardy is a 89-year-old female patient who presents to the office today for follow-up.  She has CAD with prior stenting, chronic HFpEF, hypertension, and hyperlipidemia.  She is no longer taking aspirin due to recurrent nosebleeds. She stopped taking valsartan due to hypotension.  She has occasional shortness of breath with strenuous physical exertion which resolves on its own. She denies any chest pain, lightheadedness/dizziness, palpitations, or edema.    [facility]  Past Medical History:   Diagnosis Date    Acute bacterial sialadenitis 05/09/2023    Arthritis     Broken shoulder 1960    Bronchitis     Bunion     Cancer, skin, squamous cell     Cerumen impaction     CHF (congestive heart failure)     Condition not found     Ulcer    Corns and callus     Hallux valgus of left foot 03/15/2018    Hallux valgus of right foot 03/15/2018    Hammer toe     Head injury 1960    head injury from falling off back of truck    Hearing loss     History of kidney problems     History of total knee arthroplasty, left 05/07/2018 11/03/2018    Hyperlipidemia     Hypertension     Ingrowing toenail     Kidney disease     Numbness in feet     Obesity     Primary osteoarthritis of left knee 09/11/2017    Sinus congestion     Tinea unguium          ALLERGY  Allergies   Allergen Reactions    Sulfa Antibiotics Rash          SURGICALHX  Past Surgical History:   Procedure Laterality Date    CORONARY ANGIOPLASTY WITH STENT PLACEMENT  2006    OTHER SURGICAL HISTORY      Artificial Joints/Limbs    SHOULDER SURGERY Left 1963    SKIN CANCER EXCISION      TOTAL KNEE ARTHROPLASTY Left 1983    TOTAL KNEE ARTHROPLASTY Right 2004          SOC  Social History     Socioeconomic History    Marital status:    Tobacco Use    Smoking status: Never     Passive exposure: Past    Smokeless tobacco: Never   Vaping Use    Vaping status: Never Used   Substance and Sexual  Activity    Alcohol use: Never    Drug use: Never    Sexual activity: Defer         FAMHX  Family History   Problem Relation Age of Onset    Arthritis Mother     Stroke Mother     Heart disease Mother     Diabetes Mother         Mellitus    Arthritis Father     Heart disease Father     Colon cancer Brother 50          TARA  Current Outpatient Medications on File Prior to Visit   Medication Sig    amLODIPine (NORVASC) 10 MG tablet     atorvastatin (LIPITOR) 40 MG tablet     Calcium + Vitamin D3 600-10 MG-MCG tablet per tablet Take 1 tablet by mouth Daily.    Calcium Carbonate-Vitamin D (Calcium-Vitamin D) 600-125 MG-UNIT tablet Calcium 600 + D(3) 600-125 mg-unit oral tablet take 1 tablet by oral route daily   Active    Cholecalciferol 125 MCG (5000 UT) tablet Vitamin D3 125 mcg (5,000 unit) oral tablet take 1 tablet by oral route daily   Active    Diclofenac Sodium (VOLTAREN) 1 % gel gel Apply 4 g topically to the appropriate area as directed 4 (Four) Times a Day As Needed (pain and swelling).    famotidine (PEPCID) 20 MG tablet     furosemide (LASIX) 40 MG tablet 1qd    metoprolol succinate XL (TOPROL-XL) 25 MG 24 hr tablet     Multiple Vitamins-Minerals (PRESERVISION AREDS 2+MULTI VIT PO) PreserVision AREDS-2 205-995-51-1 mg-unit-mg-mg oral capsule take 1 capsule by oral route daily   Active    Multivitamin tablet tablet Take 1 tablet by mouth Daily.    multivitamin with minerals tablet tablet 1qd    potassium chloride (K-DUR,KLOR-CON) 10 MEQ CR tablet 2 (Two) Times a Day.    aspirin 81 MG chewable tablet aspirin 81 mg oral tablet,chewable chew 1 tablet (81 mg) by oral route once daily   Suspended (Patient not taking: Reported on 2/20/2024)    [DISCONTINUED] azelastine (ASTELIN) 0.1 % nasal spray  (Patient not taking: Reported on 2/15/2024)    [DISCONTINUED] gabapentin (NEURONTIN) 300 MG capsule  (Patient not taking: Reported on 2/15/2024)    [DISCONTINUED] valsartan (DIOVAN) 40 MG tablet  (Patient not  "taking: Reported on 3/21/2024)     No current facility-administered medications on file prior to visit.       Objective   /59   Pulse 56   Ht 170.2 cm (67\")   Wt 76.7 kg (169 lb)   BMI 26.47 kg/m²       Physical Exam  HENT:      Head: Normocephalic.   Neck:      Vascular: No carotid bruit.   Cardiovascular:      Rate and Rhythm: Normal rate and regular rhythm.      Pulses: Normal pulses.      Heart sounds: Normal heart sounds. No murmur heard.  Pulmonary:      Effort: Pulmonary effort is normal.      Breath sounds: Normal breath sounds.   Musculoskeletal:      Cervical back: Neck supple.      Right lower leg: No edema.      Left lower leg: No edema.   Skin:     General: Skin is dry.   Neurological:      Mental Status: She is alert and oriented to person, place, and time.   Psychiatric:         Behavior: Behavior normal.         Result Review :   The following data was reviewed by: MELVIN Cedeno on 03/21/2024:  No results found for: \"PROBNP\"     No results found for: \"TSH\"   No results found for: \"FREET4\"   No results found for: \"DDIMERQUANT\"  Magnesium   Date Value Ref Range Status   08/28/2022 2.2 1.7 - 2.3 mg/dL Final      No results found for: \"DIGOXIN\"   Lab Results   Component Value Date    TROPONINT <0.010 08/28/2022                Assessment and Plan    Diagnoses and all orders for this visit:    1. CAD S/P percutaneous coronary angioplasty (Primary)  She denies any anginal symptoms and is no longer on antiplatelet therapy due to recurrent severe nosebleeds.    2. Chronic heart failure with preserved ejection fraction (HFpEF)  Symptomatically stable at this time and euvolemic on exam today, continue Lasix 40 mg daily.    3. Essential hypertension  Mildly elevated in office today which she attributes to whitecoat hypertension.  Continue to monitor blood pressure at home.  Continue amlodipine 10 mg daily and metoprolol 25 mg daily.    4. Hyperlipidemia LDL goal <70  Last lipid panel is not " available for review, request from PCP.  For now continue atorvastatin 40 mg nightly.          Follow Up   Return in about 6 months (around 9/21/2024) for Follow up with Dr Goff.    Patient was given instructions and counseling regarding her condition or for health maintenance advice. Please see specific information pulled into the AVS if appropriate.     Megha Hardy  reports that she has never smoked. She has been exposed to tobacco smoke. She has never used smokeless tobacco.           MELVIN Cedeno  03/21/24  08:25 EDT    Dictated Utilizing Dragon Dictation

## 2024-04-03 ENCOUNTER — OFFICE VISIT (OUTPATIENT)
Dept: PODIATRY | Facility: CLINIC | Age: 89
End: 2024-04-03
Payer: MEDICARE

## 2024-04-03 VITALS
TEMPERATURE: 98.4 F | HEIGHT: 67 IN | DIASTOLIC BLOOD PRESSURE: 74 MMHG | SYSTOLIC BLOOD PRESSURE: 163 MMHG | OXYGEN SATURATION: 98 % | HEART RATE: 64 BPM | WEIGHT: 167 LBS | BODY MASS INDEX: 26.21 KG/M2

## 2024-04-03 DIAGNOSIS — R26.2 DIFFICULTY WALKING: ICD-10-CM

## 2024-04-03 DIAGNOSIS — M79.672 FOOT PAIN, BILATERAL: ICD-10-CM

## 2024-04-03 DIAGNOSIS — L60.0 ONYCHOCRYPTOSIS: Primary | ICD-10-CM

## 2024-04-03 DIAGNOSIS — B35.1 ONYCHOMYCOSIS: ICD-10-CM

## 2024-04-03 DIAGNOSIS — M79.671 FOOT PAIN, BILATERAL: ICD-10-CM

## 2024-04-03 DIAGNOSIS — M20.11 VALGUS DEFORMITY OF BOTH GREAT TOES: ICD-10-CM

## 2024-04-03 DIAGNOSIS — M20.10 VALGUS DEFORMITY OF GREAT TOE, UNSPECIFIED LATERALITY: ICD-10-CM

## 2024-04-03 DIAGNOSIS — M20.41 HAMMER TOES OF BOTH FEET: ICD-10-CM

## 2024-04-03 DIAGNOSIS — M20.12 VALGUS DEFORMITY OF BOTH GREAT TOES: ICD-10-CM

## 2024-04-03 DIAGNOSIS — M20.42 HAMMER TOES OF BOTH FEET: ICD-10-CM

## 2024-04-03 NOTE — PROGRESS NOTES
[facility] - PODIATRY    Today's Date: 04/03/24    Patient Name: Megha Hardy  MRN: [id number]  CSN: [id number]  PCP: Luz Marina Guerrero APRN, Last PCP Visit: 3/26/2024  Referring Provider: No ref. provider found    SUBJECTIVE     Chief Complaint   Patient presents with    Left Foot - Nail Problem, Follow-up    Right Foot - Nail Problem, Follow-up     HPI: Megha Hardy, a 89 y.o.female, comes to clinic.    New, Established, New Problem:  established   Location:  Toenails  Duration:   Greater than five years  Onset:  Gradual  Nature:  sore with palpation.  Stable, worsening, improving:   Stable  Aggravating factors:  Pain with shoe gear and ambulation.  Previous Treatment:  debridement    Medical changes: no changes    Patient denies any fevers, chills, nausea, vomiting, shortness of breathe, nor any other constitutional signs nor symptoms.     I have reviewed/confirmed previously documented HPI with no changes.       Past Medical History:   Diagnosis Date    Acute bacterial sialadenitis 05/09/2023    Arthritis     Broken shoulder 1960    Bronchitis     Bunion     Cancer, skin, squamous cell     Cerumen impaction     CHF (congestive heart failure)     Condition not found     Ulcer    Corns and callus     Hallux valgus of left foot 03/15/2018    Hallux valgus of right foot 03/15/2018    Hammer toe     Head injury 1960    head injury from falling off back of truck    Hearing loss     History of kidney problems     History of total knee arthroplasty, left 05/07/2018 11/03/2018    Hyperlipidemia     Hypertension     Ingrowing toenail     Kidney disease     Numbness in feet     Obesity     Primary osteoarthritis of left knee 09/11/2017    Sinus congestion     Tinea unguium      Past Surgical History:   Procedure Laterality Date    CORONARY ANGIOPLASTY WITH STENT PLACEMENT  2006    OTHER SURGICAL HISTORY      Artificial Joints/Limbs    SHOULDER SURGERY Left 1963    SKIN CANCER EXCISION       TOTAL KNEE ARTHROPLASTY Left 1983    TOTAL KNEE ARTHROPLASTY Right 2004     Family History   Problem Relation Age of Onset    Arthritis Mother     Stroke Mother     Heart disease Mother     Diabetes Mother         Mellitus    Arthritis Father     Heart disease Father     Colon cancer Brother 50     Social History     Socioeconomic History    Marital status:    Tobacco Use    Smoking status: Never     Passive exposure: Past    Smokeless tobacco: Never   Vaping Use    Vaping status: Never Used   Substance and Sexual Activity    Alcohol use: Never    Drug use: Never    Sexual activity: Defer     Allergies   Allergen Reactions    Sulfa Antibiotics Rash     Current Outpatient Medications   Medication Sig Dispense Refill    amLODIPine (NORVASC) 10 MG tablet       atorvastatin (LIPITOR) 40 MG tablet       Calcium + Vitamin D3 600-10 MG-MCG tablet per tablet Take 1 tablet by mouth Daily.      Calcium Carbonate-Vitamin D (Calcium-Vitamin D) 600-125 MG-UNIT tablet Calcium 600 + D(3) 600-125 mg-unit oral tablet take 1 tablet by oral route daily   Active      Cholecalciferol 125 MCG (5000 UT) tablet Vitamin D3 125 mcg (5,000 unit) oral tablet take 1 tablet by oral route daily   Active      Diclofenac Sodium (VOLTAREN) 1 % gel gel Apply 4 g topically to the appropriate area as directed 4 (Four) Times a Day As Needed (pain and swelling). 150 g 0    famotidine (PEPCID) 20 MG tablet       furosemide (LASIX) 40 MG tablet 1qd      metoprolol succinate XL (TOPROL-XL) 25 MG 24 hr tablet       Multiple Vitamins-Minerals (PRESERVISION AREDS 2+MULTI VIT PO) PreserVision AREDS-2 515-758-28-1 mg-unit-mg-mg oral capsule take 1 capsule by oral route daily   Active      Multivitamin tablet tablet Take 1 tablet by mouth Daily.      multivitamin with minerals tablet tablet 1qd      potassium chloride (K-DUR,KLOR-CON) 10 MEQ CR tablet 2 (Two) Times a Day.       No current facility-administered medications for this visit.     Review of  Systems   Constitutional: Negative.    Skin:         Painful toenails.   All other systems reviewed and are negative.      OBJECTIVE     Vitals:    24 0926   BP: 163/74   Pulse: 64   Temp: 98.4 °F (36.9 °C)   SpO2: 98%         Patient seen in no apparent distress.      PHYSICAL EXAM:     Foot/Ankle Exam    GENERAL  Appearance:  elderly (Chronically ill)  Orientation:  AAOx3  Affect:  appropriate  Gait:  antalgic  Assistance:  cane use  Right shoe gear: casual shoe  Left shoe gear: casual shoe    VASCULAR     Right Foot Vascularity   Dorsalis pedis:  2+  Posterior tibial:  2+  Skin temperature:  warm  Edema gradin+ and pitting  CFT:  < 3 seconds  Pedal hair growth:  Absent  Varicosities:  severe varicosities     Left Foot Vascularity   Dorsalis pedis:  2+  Posterior tibial:  2+  Skin temperature:  warm  Edema grading:  Pitting and 2+  CFT:  < 3 seconds  Pedal hair growth:  Absent  Varicosities:  severe varicosities     NEUROLOGIC     Right Foot Neurologic   Normal sensation    Light touch sensation: normal  Vibratory sensation: normal  Hot/Cold sensation: normal  Protective Sensation using Bluford-Maribell Monofilament:   Sites intact: 10  Sites tested: 10     Left Foot Neurologic   Normal sensation    Light touch sensation: normal  Vibratory sensation: normal  Hot/Cold sensation:  normal  Protective Sensation using Bluford-Maribell Monofilament:   Sites intact: 10  Sites tested: 10    MUSCULOSKELETAL     Right Foot Musculoskeletal   Hammertoe:  Second toe and third toe  Hallux valgus: Yes       Left Foot Musculoskeletal   Hammertoe:  Second toe and third toe  Hallux valgus: Yes      MUSCLE STRENGTH     Right Foot Muscle Strength   Foot dorsiflexion:  4-  Foot plantar flexion:  4-  Foot inversion:  4-  Foot eversion:  4-     Left Foot Muscle Strength   Foot dorsiflexion:  4-  Foot plantar flexion:  4-  Foot inversion:  4-  Foot eversion:  4-    RANGE OF MOTION     Right Foot Range of Motion   Foot and  ankle ROM within normal limits       Left Foot Range of Motion   Foot and ankle ROM within normal limits      DERMATOLOGIC      Right Foot Dermatologic   Skin  Right foot skin is intact.   Nails  1.  Positive for elongated, onychomycosis, abnormal thickness, subungual debris and ingrown toenail.  2.  Positive for elongated, onychomycosis, abnormal thickness, subungual debris and ingrown toenail.  3.  Positive for elongated, onychomycosis, abnormal thickness, subungual debris and ingrown toenail.  4.  Positive for elongated, onychomycosis, abnormal thickness, subungual debris and ingrown toenail.  5.  Positive for elongated, onychomycosis, abnormal thickness, subungual debris and ingrown toenail.     Left Foot Dermatologic   Skin  Left foot skin is intact.   Nails  1.  Positive for elongated, onychomycosis, abnormal thickness, subungual debris and ingrown toenail.  2.  Positive for elongated, onychomycosis, abnormal thickness, subungual debris and ingrown toenail.  3.  Positive for elongated, onychomycosis, abnormal thickness, subungual debris and ingrown toenail.  4.  Positive for elongated, onychomycosis, abnormally thick, subungual debris and ingrown toenail.  5.  Positive for elongated, onychomycosis, abnormally thick, subungual debris and ingrown toenail.  I have reexamined the patient the results are consistent with the previously documented exam.        ASSESSMENT/PLAN     Diagnoses and all orders for this visit:    1. Onychocryptosis (Primary)    2. Onychomycosis    3. Valgus deformity of great toe, unspecified laterality    4. Difficulty walking    5. Valgus deformity of both great toes    6. Hammer toes of both feet    7. Foot pain, bilateral          Comprehensive lower extremity examination and evaluation was performed.    Discussed findings and treatment plan including risks, benefits, and treatment options with patient in detail. Patient agreed with treatment plan.    Nails 1 through 5 bilaterally were  debrided in thickness and length and then smoothed with a Dremel Tool.  Tolerated the procedure well without complications.    An After Visit Summary was printed and given to the patient at discharge, including (if requested) any available informative/educational handouts regarding diagnosis, treatment, or medications. All questions were answered to patient/family satisfaction. Should symptoms fail to improve or worsen they agree to call or return to clinic or to go to the Emergency Department. Discussed the importance of following up with any needed screening tests/labs/specialist appointments and any requested follow-up recommended by me today. Importance of maintaining follow-up discussed and patient accepts that missed appointments can delay diagnosis and potentially lead to worsening of conditions.    Return in about 9 weeks (around 6/5/2024) for Toenail Care., or sooner if acute issues arise.  I have reviewed the assessment and plan and verified the accuracy of it. No changes to assessment and plan since the information was documented. Aidan Lundberg DPM 04/03/24     I have dictated this note utilizing Dragon Dictation.  Please note that portions of this note were completed with a voice recognition program.  Part of this note may be an electronic transcription/translation of spoken language to printed text using the Dragon Dictation System.        This document has been electronically signed by Aidan Lundberg DPM on April 3, 2024 09:45 EDT

## 2024-04-12 ENCOUNTER — TELEPHONE (OUTPATIENT)
Dept: NEUROLOGY | Facility: CLINIC | Age: 89
End: 2024-04-12
Payer: MEDICARE

## 2024-04-15 ENCOUNTER — LAB (OUTPATIENT)
Dept: LAB | Facility: HOSPITAL | Age: 89
End: 2024-04-15
Payer: MEDICARE

## 2024-04-15 ENCOUNTER — OFFICE VISIT (OUTPATIENT)
Dept: NEUROLOGY | Facility: CLINIC | Age: 89
End: 2024-04-15
Payer: MEDICARE

## 2024-04-15 VITALS
HEART RATE: 75 BPM | BODY MASS INDEX: 26.56 KG/M2 | SYSTOLIC BLOOD PRESSURE: 140 MMHG | HEIGHT: 67 IN | WEIGHT: 169.2 LBS | DIASTOLIC BLOOD PRESSURE: 64 MMHG | OXYGEN SATURATION: 96 %

## 2024-04-15 DIAGNOSIS — F03.90 DEMENTIA WITHOUT BEHAVIORAL DISTURBANCE: Primary | ICD-10-CM

## 2024-04-15 DIAGNOSIS — F03.90 DEMENTIA WITHOUT BEHAVIORAL DISTURBANCE: ICD-10-CM

## 2024-04-15 LAB
ALBUMIN SERPL-MCNC: 4.2 G/DL (ref 3.5–5.2)
ALBUMIN/GLOB SERPL: 1.4 G/DL
ALP SERPL-CCNC: 79 U/L (ref 39–117)
ALT SERPL W P-5'-P-CCNC: 23 U/L (ref 1–33)
ANION GAP SERPL CALCULATED.3IONS-SCNC: 7.7 MMOL/L (ref 5–15)
AST SERPL-CCNC: 37 U/L (ref 1–32)
BILIRUB SERPL-MCNC: 0.6 MG/DL (ref 0–1.2)
BUN SERPL-MCNC: 14 MG/DL (ref 8–23)
BUN/CREAT SERPL: 15.9 (ref 7–25)
CALCIUM SPEC-SCNC: 9.9 MG/DL (ref 8.2–9.6)
CHLORIDE SERPL-SCNC: 108 MMOL/L (ref 98–107)
CO2 SERPL-SCNC: 28.3 MMOL/L (ref 22–29)
CREAT SERPL-MCNC: 0.88 MG/DL (ref 0.57–1)
EGFRCR SERPLBLD CKD-EPI 2021: 61.4 ML/MIN/1.73
GLOBULIN UR ELPH-MCNC: 3 GM/DL
GLUCOSE SERPL-MCNC: 98 MG/DL (ref 65–99)
POTASSIUM SERPL-SCNC: 4.5 MMOL/L (ref 3.5–5.2)
PROT SERPL-MCNC: 7.2 G/DL (ref 6–8.5)
SODIUM SERPL-SCNC: 144 MMOL/L (ref 136–145)
T4 FREE SERPL-MCNC: 0.99 NG/DL (ref 0.93–1.7)
TSH SERPL DL<=0.05 MIU/L-ACNC: 2.96 UIU/ML (ref 0.27–4.2)
VIT B12 BLD-MCNC: 536 PG/ML (ref 211–946)

## 2024-04-15 PROCEDURE — 99204 OFFICE O/P NEW MOD 45 MIN: CPT | Performed by: PSYCHIATRY & NEUROLOGY

## 2024-04-15 PROCEDURE — 82607 VITAMIN B-12: CPT

## 2024-04-15 PROCEDURE — 84439 ASSAY OF FREE THYROXINE: CPT | Performed by: PSYCHIATRY & NEUROLOGY

## 2024-04-15 PROCEDURE — 36415 COLL VENOUS BLD VENIPUNCTURE: CPT

## 2024-04-15 PROCEDURE — 80053 COMPREHEN METABOLIC PANEL: CPT

## 2024-04-15 PROCEDURE — 84443 ASSAY THYROID STIM HORMONE: CPT | Performed by: PSYCHIATRY & NEUROLOGY

## 2024-04-15 PROCEDURE — 1160F RVW MEDS BY RX/DR IN RCRD: CPT | Performed by: PSYCHIATRY & NEUROLOGY

## 2024-04-15 PROCEDURE — 1159F MED LIST DOCD IN RCRD: CPT | Performed by: PSYCHIATRY & NEUROLOGY

## 2024-04-15 PROCEDURE — 83921 ORGANIC ACID SINGLE QUANT: CPT

## 2024-04-15 RX ORDER — DONEPEZIL HYDROCHLORIDE 5 MG/1
5 TABLET, FILM COATED ORAL EVERY MORNING
Qty: 30 TABLET | Refills: 3 | Status: SHIPPED | OUTPATIENT
Start: 2024-04-15 | End: 2025-04-15

## 2024-04-15 RX ORDER — VALSARTAN 40 MG/1
TABLET ORAL
COMMUNITY
Start: 2024-04-09

## 2024-04-15 NOTE — PROGRESS NOTES
Notes by LPN:  Patient referred for cognitive impairment. Her niece is with her today and she patient gives us permission to speak with her. She still lives alone.            Subjective:     Dear Luz Marina Aranda, thank you for referring Mrs. Hardy for neurological consultation.  As you know, she is a delightful 89-year-old right-handed woman sent for evaluation of cognitive dysfunction.  She is accompanied by her niece, to whom she is given permission to provide most of the history.  She still lives alone.  She is handling some activities of daily living such as housekeeping without any assistance aside from spring cleaning.  However, she is starting to fail on multiple other activities of daily living such as medication management, bills, cooking on the stove, etc.  These have triggered concerned about her safety at home.  She has excellent family support with a nephew that lives nearby and her niece who is her POA.  Patient ID: Megha Hardy is a 89 y.o. female.    History of Present Illness  The following portions of the patient's history were reviewed and updated as appropriate: allergies, current medications, past family history, past medical history, past social history, past surgical history, and problem list.    Review of Systems   Constitutional:  Negative for activity change, appetite change and fatigue.   HENT:  Positive for hearing loss. Negative for facial swelling, trouble swallowing and voice change.    Eyes:  Negative for photophobia, pain and visual disturbance.   Respiratory:  Negative for chest tightness, shortness of breath and wheezing.    Cardiovascular:  Positive for leg swelling. Negative for chest pain and palpitations.   Gastrointestinal:  Negative for abdominal pain, nausea and vomiting.   Endocrine: Negative for cold intolerance and heat intolerance.   Musculoskeletal:  Positive for arthralgias, back pain, gait problem and neck pain. Negative for joint swelling, myalgias and neck  stiffness.   Neurological:  Positive for speech difficulty and weakness. Negative for dizziness, tremors, seizures, syncope, facial asymmetry, light-headedness, numbness and headaches.   Hematological:  Bruises/bleeds easily.   Psychiatric/Behavioral:  Positive for agitation and confusion. Negative for behavioral problems, decreased concentration, dysphoric mood, hallucinations, self-injury, sleep disturbance and suicidal ideas. The patient is not nervous/anxious and is not hyperactive.         Objective:    Neurological Exam  Awake alert pleasant cooperative with fluent speech and reasonable speech comprehension.  Normal social demeanor.    She scored 18 on the Folstein with deficits in attention, recall, and orientation.  She scored 7 on animal fluency and 1 on clock drawing.    Cranial nerves II through XII normal and symmetric.    Motor exam reveals age-appropriate and symmetric tone bulk and power.    Sensory exam reveals reasonably preserved symmetric sensation to primary modalities.    Coordination testing reveals slow but accurate finger-nose-finger and rhythmic rapid alternating movements bilaterally.    Tendon reflexes are diffusely suppressed but symmetric.  Toes are mute bilaterally.  Physical Exam  No bruits.  Cardiac rhythm is regular.  Assessment/Plan:     Diagnoses and all orders for this visit:    1. Dementia without behavioral disturbance (Primary)  -     TSH+Free T4  -     Vitamin B12; Future  -     Methylmalonic Acid, Serum; Future  -     Comprehensive Metabolic Panel; Future    Other orders  -     donepezil (Aricept) 5 MG tablet; Take 1 tablet by mouth Every Morning.  Dispense: 30 tablet; Refill: 3       89-year-old with mild dementia.  She is now requiring assistance with multiple activities of daily living.  She appears to have excellent family support.  Discussed all of this with her and her niece.  I have taken the liberty of arranging a laboratory workup for modifiable causes and I will  review the results when available.  Starting her on Aricept 5 mg every morning we will see how she responds over the next 3 months.  Thank you very much for the opportunity to participate in the care of this delightful lady.

## 2024-04-22 LAB — METHYLMALONATE SERPL-SCNC: 226 NMOL/L (ref 0–378)

## 2024-05-16 ENCOUNTER — TELEPHONE (OUTPATIENT)
Dept: NEUROLOGY | Facility: CLINIC | Age: 89
End: 2024-05-16
Payer: MEDICARE

## 2024-05-17 ENCOUNTER — TELEPHONE (OUTPATIENT)
Dept: NEUROLOGY | Facility: CLINIC | Age: 89
End: 2024-05-17
Payer: MEDICARE

## 2024-05-23 ENCOUNTER — OFFICE VISIT (OUTPATIENT)
Dept: OTOLARYNGOLOGY | Facility: CLINIC | Age: 89
End: 2024-05-23
Payer: MEDICARE

## 2024-05-23 VITALS — BODY MASS INDEX: 30.95 KG/M2 | OXYGEN SATURATION: 98 % | TEMPERATURE: 97.5 F | HEIGHT: 62 IN | HEART RATE: 60 BPM

## 2024-05-23 DIAGNOSIS — R04.0 NASAL BLEEDING: ICD-10-CM

## 2024-05-23 DIAGNOSIS — H90.3 SENSORINEURAL HEARING LOSS (SNHL) OF BOTH EARS: Primary | ICD-10-CM

## 2024-05-23 DIAGNOSIS — H61.23 BILATERAL IMPACTED CERUMEN: ICD-10-CM

## 2024-05-23 PROBLEM — H91.90 HEARING LOSS: Status: RESOLVED | Noted: 2023-04-25 | Resolved: 2024-05-23

## 2024-05-23 RX ORDER — FAMOTIDINE 40 MG/1
40 TABLET, FILM COATED ORAL
COMMUNITY
Start: 2024-05-06

## 2024-05-23 RX ORDER — ANTIOX #8/OM3/DHA/EPA/LUT/ZEAX 250-2.5 MG
1 CAPSULE ORAL EVERY 12 HOURS SCHEDULED
COMMUNITY
Start: 2024-05-06

## 2024-05-23 NOTE — PROGRESS NOTES
Patient Name: Megha Hardy   Visit Date: 05/23/2024   Patient ID: [id number]  Provider: Sekou Hood MD    Sex: female  [place] [facility]   YOB: 1930  Location Address: [address] [place] [address], [address] [place] [address],?[place]?[id number]    Primary Care Provider Luz Marina Guerrero APRN  Location Phone: [phone]    Referring Provider: No ref. provider found        Chief Complaint  Follow-up and Cerumen Impaction    Subjective    History of Present Illness  Megha Hardy is a 89 y.o. female who presents to [facility] today as a consult from No ref. provider found.    She presents to the clinic today for evaluation of sensorineural hearing loss and issues with earwax, as well as follow-up for epistaxis.  I recently saw her in February and the nasal packing was removed due to a bleed she had had.  Her daughter informs me that she has had no further issues and seems to be doing very well.  They do use Petar-Synephrine intermittently when there is any oozing.  She breathes well through both sides of her nose without any other nasal symptoms.    She uses hearing aids that she has had a longstanding sensorineural hearing loss.  Her audiologist noted cerumen impaction on both sides which they present for further evaluation for.  She has had no ear infection or pain issues.    Past Medical History:   Diagnosis Date    Acute bacterial sialadenitis 05/09/2023    Arthritis     Broken shoulder 1960    Bronchitis     Bunion     Cancer, skin, squamous cell     Cerumen impaction     CHF (congestive heart failure)     Condition not found     Ulcer    Corns and callus     Hallux valgus of left foot 03/15/2018    Hallux valgus of right foot 03/15/2018    Hammer toe     Head injury 1960    head injury from falling off back of truck    Hearing loss     History of kidney problems     History of total knee arthroplasty, left 05/07/2018 11/03/2018    Hyperlipidemia      Hypertension     Ingrowing toenail     Kidney disease     Numbness in feet     Obesity     Primary osteoarthritis of left knee 09/11/2017    Sinus congestion     Tinea unguium        Past Surgical History:   Procedure Laterality Date    CORONARY ANGIOPLASTY WITH STENT PLACEMENT  2006    OTHER SURGICAL HISTORY      Artificial Joints/Limbs    SHOULDER SURGERY Left 1963    SKIN CANCER EXCISION      TOTAL KNEE ARTHROPLASTY Left 1983    TOTAL KNEE ARTHROPLASTY Right 2004         Current Outpatient Medications:     amLODIPine (NORVASC) 10 MG tablet, , Disp: , Rfl:     atorvastatin (LIPITOR) 40 MG tablet, , Disp: , Rfl:     Calcium Carbonate-Vitamin D (Calcium-Vitamin D) 600-125 MG-UNIT tablet, Calcium 600 + D(3) 600-125 mg-unit oral tablet take 1 tablet by oral route daily   Active, Disp: , Rfl:     Diclofenac Sodium (VOLTAREN) 1 % gel gel, Apply 4 g topically to the appropriate area as directed 4 (Four) Times a Day As Needed (pain and swelling)., Disp: 150 g, Rfl: 0    donepezil (Aricept) 5 MG tablet, Take 1 tablet by mouth Every Morning., Disp: 30 tablet, Rfl: 3    famotidine (PEPCID) 40 MG tablet, Take 1 tablet by mouth every night at bedtime., Disp: , Rfl:     furosemide (LASIX) 40 MG tablet, 1qd, Disp: , Rfl:     metoprolol succinate XL (TOPROL-XL) 25 MG 24 hr tablet, , Disp: , Rfl:     multivitamins-minerals (PRESERVISION AREDS 2) capsule capsule, Take 1 capsule by mouth Every 12 (Twelve) Hours., Disp: , Rfl:     potassium chloride (K-DUR,KLOR-CON) 10 MEQ CR tablet, 2 (Two) Times a Day., Disp: , Rfl:     valsartan (DIOVAN) 40 MG tablet, , Disp: , Rfl:     Calcium + Vitamin D3 600-10 MG-MCG tablet per tablet, Take 1 tablet by mouth Daily. (Patient not taking: Reported on 5/23/2024), Disp: , Rfl:     Cholecalciferol 125 MCG (5000 UT) tablet, Vitamin D3 125 mcg (5,000 unit) oral tablet take 1 tablet by oral route daily   Active (Patient not taking: Reported on 5/23/2024), Disp: , Rfl:     Multiple Vitamins-Minerals  "(PRESERVISION AREDS 2+MULTI VIT PO), PreserVision AREDS-2 796-165-82-1 mg-unit-mg-mg oral capsule take 1 capsule by oral route daily   Active (Patient not taking: Reported on 5/23/2024), Disp: , Rfl:     Multivitamin tablet tablet, Take 1 tablet by mouth Daily. (Patient not taking: Reported on 5/23/2024), Disp: , Rfl:     multivitamin with minerals tablet tablet, 1qd (Patient not taking: Reported on 5/23/2024), Disp: , Rfl:      Allergies   Allergen Reactions    Sulfa Antibiotics Rash       Family History   Problem Relation Age of Onset    Arthritis Mother     Stroke Mother     Heart disease Mother     Diabetes Mother         Mellitus    Arthritis Father     Heart disease Father     Seizures Sister     Colon cancer Brother 50        Social History     Social History Narrative    Not on file       Objective     Vital Signs:   Pulse 60   Temp 97.5 °F (36.4 °C) (Tympanic)   Ht 157.5 cm (62\")   SpO2 98%   BMI 30.95 kg/m²       Physical Exam    Constitutional   Appearance  : well developed, well-nourished, alert and in no acute distress, voice clear and strong    Head  Inspection  : no deformities or lesions  Face  Inspection  : No facial lesions; House-Brackmann I/VI bilaterally  Palpation  : No TMJ crepitus nor  muscle tenderness bilaterally    Eyes  Vision  Visual Fields  : Extraocular movements are intact. No spontaneous or gaze-induced nystagmus.  Conjunctivae  : clear  Sclerae  : clear  Pupils and Irises  : pupils equal, round, and reactive to light.     Ears, Nose, Mouth and Throat    Ears    External Ears  : appearance within normal limits, no lesions present  Otoscopic Examination  : Cerumen impactions bilaterally, removed with curettes and microscope, tympanic membrane appearance within normal limits bilaterally without perforations, well-aerated middle ears  Hearing  : intact to conversational voice both ears  Tunning fork testing:     :    Nose    External Nose  : appearance normal  Intranasal " Exam  : mucosa within normal limits, vestibules normal, no intranasal lesions present, septum midline, sinuses non tender to percussion  Oral Cavity    Oral Mucosa  : oral mucosa normal without pallor or cyanosis  Lips  : lip appearance normal  Teeth  : normal dentition for age  Gums  : gums pink, non-swollen, no bleeding present  Tongue  : tongue appearance normal; normal mobility  Palate  : hard palate normal, soft palate appearance normal with symmetric mobility    Throat    Oropharynx  : no inflammation or lesions present, tonsils within normal limits  Hypopharynx  : appearance within normal limits, superior epiglottis within normal limits  Larynx  : appearance within normal limits, vocal cords within normal limits, no lesions present    Neck  Inspection/Palpation  : normal appearance, no masses or tenderness, trachea midline; thyroid size normal, nontender, no nodules or masses present on palpation    Respiratory  Respiratory Effort  : breathing unlabored  Inspection of Chest  : normal appearance, no retractions    Cardiovascular  Heart  : regular rate and rhythm    Lymphatic  Neck  : no lymphadenopathy present  Supraclavicular Nodes  : no lymphadenopathy present  Preauricular Nodes  : no lymphadenopathy present    Skin and Subcutaneous Tissue  General Inspection  : Regarding face and neck - there are no rashes present, no lesions present, and no areas of discoloration    Neurologic  Cranial Nerves  : cranial nerves II-XII are grossly intact bilaterally  Gait and Station  : normal gait, able to stand without diffculty    Psychiatric  Judgement and Insight  : judgment and insight intact  Mood and Affect  : mood normal, affect appropriate     Ear Cerumen Removal    Date/Time: 5/23/2024 9:32 AM    Performed by: Sekou Hood MD  Authorized by: Sekou Hood MD    Anesthesia:  Local Anesthetic: none  Location details: left ear and right ear  Procedure type: instrumentation, curette   Sedation:  Patient  sedated: no                Assessment and Plan    Diagnoses and all orders for this visit:    1. Sensorineural hearing loss (SNHL) of both ears (Primary)    2. Bilateral impacted cerumen    3. Nasal bleeding    Other orders  -     Ear Cerumen Removal    Lamination today revealed cerumen impactions on both sides which I was able to remove with curettes and the microscope.  Nasal exam was normal.  I instructed her to use Vaseline or saline gel for moisturization of her nose feels dry.  I will be glad to see her back anytime she needs cerumen removed.  Eardrums did look healthy.  Findings were discussed with the daughter.    Follow Up   No follow-ups on file.  Patient was given instructions and counseling regarding her condition or for health maintenance advice. Please see specific information pulled into the AVS if appropriate.

## 2024-06-25 ENCOUNTER — OFFICE VISIT (OUTPATIENT)
Dept: PODIATRY | Facility: CLINIC | Age: 89
End: 2024-06-25
Payer: MEDICARE

## 2024-06-25 VITALS
TEMPERATURE: 98.4 F | BODY MASS INDEX: 30.91 KG/M2 | OXYGEN SATURATION: 92 % | DIASTOLIC BLOOD PRESSURE: 55 MMHG | HEART RATE: 66 BPM | SYSTOLIC BLOOD PRESSURE: 157 MMHG | WEIGHT: 169 LBS

## 2024-06-25 DIAGNOSIS — B35.1 ONYCHOMYCOSIS: ICD-10-CM

## 2024-06-25 DIAGNOSIS — R26.2 DIFFICULTY WALKING: Primary | ICD-10-CM

## 2024-06-25 DIAGNOSIS — M20.12 VALGUS DEFORMITY OF BOTH GREAT TOES: ICD-10-CM

## 2024-06-25 DIAGNOSIS — M79.672 FOOT PAIN, BILATERAL: ICD-10-CM

## 2024-06-25 DIAGNOSIS — M20.10 VALGUS DEFORMITY OF GREAT TOE, UNSPECIFIED LATERALITY: ICD-10-CM

## 2024-06-25 DIAGNOSIS — M79.671 FOOT PAIN, BILATERAL: ICD-10-CM

## 2024-06-25 DIAGNOSIS — L60.0 ONYCHOCRYPTOSIS: ICD-10-CM

## 2024-06-25 DIAGNOSIS — M20.41 HAMMER TOES OF BOTH FEET: ICD-10-CM

## 2024-06-25 DIAGNOSIS — M20.42 HAMMER TOES OF BOTH FEET: ICD-10-CM

## 2024-06-25 DIAGNOSIS — M20.11 VALGUS DEFORMITY OF BOTH GREAT TOES: ICD-10-CM

## 2024-06-25 PROCEDURE — 1159F MED LIST DOCD IN RCRD: CPT | Performed by: PODIATRIST

## 2024-06-25 PROCEDURE — 1160F RVW MEDS BY RX/DR IN RCRD: CPT | Performed by: PODIATRIST

## 2024-06-25 PROCEDURE — 11721 DEBRIDE NAIL 6 OR MORE: CPT | Performed by: PODIATRIST

## 2024-06-25 NOTE — PROGRESS NOTES
[facility] - [facility]    Today's Date: 06/25/24    Patient Name: Megha Hardy  MRN: [id number]  CSN: [id number]  PCP: Luz Marina Guerrero APRN, Last PCP Visit: 6/14/2024  Referring Provider: No ref. provider found    SUBJECTIVE     Chief Complaint   Patient presents with    Left Foot - Follow-up, Nail Problem    Right Foot - Follow-up, Nail Problem     HPI: Megha Hardy, a 89 y.o.female, comes to clinic.    New, Established, New Problem:  established   Location:  Toenails  Duration:   Greater than five years  Onset:  Gradual  Nature:  sore with palpation.  Stable, worsening, improving:   Stable  Aggravating factors:  Pain with shoe gear and ambulation.  Previous Treatment:  debridement    Medical changes: none    Patient denies any fevers, chills, nausea, vomiting, shortness of breathe, nor any other constitutional signs nor symptoms.       I have reviewed/confirmed previously documented HPI with no changes.       Past Medical History:   Diagnosis Date    Acute bacterial sialadenitis 05/09/2023    Arthritis     Broken shoulder 1960    Bronchitis     Bunion     Cancer, skin, squamous cell     Cerumen impaction     CHF (congestive heart failure)     Condition not found     Ulcer    Corns and callus     Hallux valgus of left foot 03/15/2018    Hallux valgus of right foot 03/15/2018    Hammer toe     Head injury 1960    head injury from falling off back of truck    Hearing loss     History of kidney problems     History of total knee arthroplasty, left 05/07/2018 11/03/2018    Hyperlipidemia     Hypertension     Ingrowing toenail     Kidney disease     Numbness in feet     Obesity     Primary osteoarthritis of left knee 09/11/2017    Sinus congestion     Tinea unguium      Past Surgical History:   Procedure Laterality Date    CORONARY ANGIOPLASTY WITH STENT PLACEMENT  2006    OTHER SURGICAL HISTORY      Artificial Joints/Limbs    SHOULDER SURGERY Left 1963    SKIN CANCER EXCISION      TOTAL  KNEE ARTHROPLASTY Left 1983    TOTAL KNEE ARTHROPLASTY Right 2004     Family History   Problem Relation Age of Onset    Arthritis Mother     Stroke Mother     Heart disease Mother     Diabetes Mother         Mellitus    Arthritis Father     Heart disease Father     Seizures Sister     Colon cancer Brother 50     Social History     Socioeconomic History    Marital status:    Tobacco Use    Smoking status: Never     Passive exposure: Past    Smokeless tobacco: Never   Vaping Use    Vaping status: Never Used   Substance and Sexual Activity    Alcohol use: Never    Drug use: Never    Sexual activity: Defer     Allergies   Allergen Reactions    Sulfa Antibiotics Rash     Current Outpatient Medications   Medication Sig Dispense Refill    amLODIPine (NORVASC) 10 MG tablet       atorvastatin (LIPITOR) 40 MG tablet       Calcium Carbonate-Vitamin D (Calcium-Vitamin D) 600-125 MG-UNIT tablet Calcium 600 + D(3) 600-125 mg-unit oral tablet take 1 tablet by oral route daily   Active      Diclofenac Sodium (VOLTAREN) 1 % gel gel Apply 4 g topically to the appropriate area as directed 4 (Four) Times a Day As Needed (pain and swelling). 150 g 0    donepezil (Aricept) 5 MG tablet Take 1 tablet by mouth Every Morning. 30 tablet 3    famotidine (PEPCID) 40 MG tablet Take 1 tablet by mouth every night at bedtime.      furosemide (LASIX) 40 MG tablet 1qd      metoprolol succinate XL (TOPROL-XL) 25 MG 24 hr tablet       multivitamins-minerals (PRESERVISION AREDS 2) capsule capsule Take 1 capsule by mouth Every 12 (Twelve) Hours.      potassium chloride (K-DUR,KLOR-CON) 10 MEQ CR tablet 2 (Two) Times a Day.      valsartan (DIOVAN) 40 MG tablet       Calcium + Vitamin D3 600-10 MG-MCG tablet per tablet Take 1 tablet by mouth Daily. (Patient not taking: Reported on 5/23/2024)      Cholecalciferol 125 MCG (5000 UT) tablet Vitamin D3 125 mcg (5,000 unit) oral tablet take 1 tablet by oral route daily   Active (Patient not taking:  Reported on 2024)      Multiple Vitamins-Minerals (PRESERVISION AREDS 2+MULTI VIT PO) PreserVision AREDS-2 520-187-37-1 mg-unit-mg-mg oral capsule take 1 capsule by oral route daily   Active (Patient not taking: Reported on 2024)      Multivitamin tablet tablet Take 1 tablet by mouth Daily. (Patient not taking: Reported on 2024)      multivitamin with minerals tablet tablet 1qd (Patient not taking: Reported on 2024)       No current facility-administered medications for this visit.     Review of Systems   Constitutional: Negative.    Skin:         Painful toenails.   All other systems reviewed and are negative.      OBJECTIVE     Vitals:    24 0922   BP: 157/55   Pulse: 66   Temp: 98.4 °F (36.9 °C)   SpO2: 92%         Patient seen in no apparent distress.      PHYSICAL EXAM:     Foot/Ankle Exam    GENERAL  Appearance:  elderly (Chronically ill)  Orientation:  AAOx3  Affect:  appropriate  Gait:  antalgic  Assistance:  cane use  Right shoe gear: casual shoe  Left shoe gear: casual shoe    VASCULAR     Right Foot Vascularity   Dorsalis pedis:  2+  Posterior tibial:  2+  Skin temperature:  warm  Edema gradin+ and pitting  CFT:  < 3 seconds  Pedal hair growth:  Absent  Varicosities:  severe varicosities     Left Foot Vascularity   Dorsalis pedis:  2+  Posterior tibial:  2+  Skin temperature:  warm  Edema grading:  Pitting and 2+  CFT:  < 3 seconds  Pedal hair growth:  Absent  Varicosities:  severe varicosities     NEUROLOGIC     Right Foot Neurologic   Normal sensation    Light touch sensation: normal  Vibratory sensation: normal  Hot/Cold sensation: normal  Protective Sensation using [place] Monofilament:   Sites intact: 10  Sites tested: 10     Left Foot Neurologic   Normal sensation    Light touch sensation: normal  Vibratory sensation: normal  Hot/Cold sensation:  normal  Protective Sensation using [place] Monofilament:   Sites intact: 10  Sites tested:  10    MUSCULOSKELETAL     Right Foot Musculoskeletal   Hamm[place]oe:  Second toe and third toe  Hallux valgus: Yes       Left Foot Musculoskeletal   [place]amm[place]oe:  Second toe and third toe  Hallux valgus: Yes      MUSCLE STRENGTH     Right Foot Muscle Strength   Foot dorsiflexion:  4-  Foot plantar flexion:  4-  Foot inversion:  4-  Foot eversion:  4-     Left Foot Muscle Strength   Foot dorsiflexion:  4-  Foot plantar flexion:  4-  Foot inversion:  4-  Foot eversion:  4-    RANGE OF MOTION     Right Foot Range of Motion   Foot and ankle ROM within normal limits       Left Foot Range of Motion   Foot and ankle ROM within normal limits      DERMATOLOGIC      Right Foot Dermatologic   Skin  Right foot skin is intact.   Nails  1.  Positive for elongated, onychomycosis, abnormal thickness, subungual debris and ingrown toenail.  2.  Positive for elongated, onychomycosis, abnormal thickness, subungual debris and ingrown toenail.  3.  Positive for elongated, onychomycosis, abnormal thickness, subungual debris and ingrown toenail.  4.  Positive for elongated, onychomycosis, abnormal thickness, subungual debris and ingrown toenail.  5.  Positive for elongated, onychomycosis, abnormal thickness, subungual debris and ingrown toenail.     Left Foot Dermatologic   Skin  Left foot skin is intact.   Nails  1.  Positive for elongated, onychomycosis, abnormal thickness, subungual debris and ingrown toenail.  2.  Positive for elongated, onychomycosis, abnormal thickness, subungual debris and ingrown toenail.  3.  Positive for elongated, onychomycosis, abnormal thickness, subungual debris and ingrown toenail.  4.  Positive for elongated, onychomycosis, abnormally thick, subungual debris and ingrown toenail.  5.  Positive for elongated, onychomycosis, abnormally thick, subungual debris and ingrown toenail.    I have reexamined the patient the results are consistent with the previously documented exam.    ASSESSMENT/PLAN     Diagnoses and all  orders for this visit:    1. Difficulty walking (Primary)    2. Valgus deformity of great toe, unspecified laterality    3. Foot pain, bilateral    4. Onychomycosis    5. Hammer toes of both feet    6. Onychocryptosis    7. Valgus deformity of both great toes      Comprehensive lower extremity examination and evaluation was performed.    Discussed findings and treatment plan including risks, benefits, and treatment options with patient in detail. Patient agreed with treatment plan.    Nails 1 through 5 bilaterally were debrided in thickness and length and then smoothed with a Dremel Tool.  Tolerated the procedure well without complications.    An After Visit Summary was printed and given to the patient at discharge, including (if requested) any available informative/educational handouts regarding diagnosis, treatment, or medications. All questions were answered to patient/family satisfaction. Should symptoms fail to improve or worsen they agree to call or return to clinic or to go to the Emergency Department. Discussed the importance of following up with any needed screening tests/labs/specialist appointments and any requested follow-up recommended by me today. Importance of maintaining follow-up discussed and patient accepts that missed appointments can delay diagnosis and potentially lead to worsening of conditions.    Return in about 9 weeks (around 8/27/2024) for Toenail Care., or sooner if acute issues arise.  I have reviewed the assessment and plan and verified the accuracy of it. No changes to assessment and plan since the information was documented. Aidan Lundberg DPM 06/25/24     I have dictated this note utilizing Dragon Dictation.  Please note that portions of this note were completed with a voice recognition program.  Part of this note may be an electronic transcription/translation of spoken language to printed text using the Dragon Dictation System.      This document has been electronically signed  by Aidan Lundberg DPM on June 25, 2024 09:59 EDT

## 2024-07-22 ENCOUNTER — OFFICE VISIT (OUTPATIENT)
Dept: NEUROLOGY | Facility: CLINIC | Age: 89
End: 2024-07-22
Payer: MEDICARE

## 2024-07-22 VITALS
BODY MASS INDEX: 29.63 KG/M2 | WEIGHT: 161 LBS | DIASTOLIC BLOOD PRESSURE: 64 MMHG | SYSTOLIC BLOOD PRESSURE: 138 MMHG | OXYGEN SATURATION: 96 % | HEART RATE: 58 BPM | HEIGHT: 62 IN

## 2024-07-22 DIAGNOSIS — F03.90 DEMENTIA WITHOUT BEHAVIORAL DISTURBANCE: Primary | ICD-10-CM

## 2024-07-22 PROCEDURE — 1160F RVW MEDS BY RX/DR IN RCRD: CPT | Performed by: PSYCHIATRY & NEUROLOGY

## 2024-07-22 PROCEDURE — G2211 COMPLEX E/M VISIT ADD ON: HCPCS | Performed by: PSYCHIATRY & NEUROLOGY

## 2024-07-22 PROCEDURE — 99214 OFFICE O/P EST MOD 30 MIN: CPT | Performed by: PSYCHIATRY & NEUROLOGY

## 2024-07-22 PROCEDURE — 1159F MED LIST DOCD IN RCRD: CPT | Performed by: PSYCHIATRY & NEUROLOGY

## 2024-07-22 RX ORDER — DONEPEZIL HYDROCHLORIDE 10 MG/1
10 TABLET, FILM COATED ORAL EVERY MORNING
Qty: 90 TABLET | Refills: 3 | Status: SHIPPED | OUTPATIENT
Start: 2024-07-22 | End: 2025-07-22

## 2024-07-22 NOTE — PROGRESS NOTES
Notes by LPN:  Patient presents for dementia follow up.        Subjective:     Patient ID: Megha Hardy is a 89 y.o. female.    Dementia  Pertinent negatives include no headaches, numbness or weakness.   The following portions of the patient's history were reviewed and updated as appropriate: allergies, current medications, past family history, past medical history, past social history, past surgical history, and problem list.    Review of Systems   HENT:  Positive for hearing loss.    Musculoskeletal:  Positive for gait problem.   Neurological:  Negative for dizziness, tremors, seizures, syncope, facial asymmetry, speech difficulty, weakness, light-headedness, numbness and headaches.   Psychiatric/Behavioral:  Positive for confusion and sleep disturbance. Negative for agitation, behavioral problems, decreased concentration, dysphoric mood, hallucinations, self-injury and suicidal ideas. The patient is not nervous/anxious and is not hyperactive.       Objective:    Neurologic Exam  Awake alert pleasant cooperative with fluent speech and reasonable speech comprehension.  Normal social demeanor.      Cranial nerves II through XII normal and symmetric.     Motor exam reveals age-appropriate and symmetric tone bulk and power.     Sensory exam reveals reasonably preserved symmetric sensation to primary modalities.     Coordination testing reveals slow but accurate finger-nose-finger and rhythmic rapid alternating movements bilaterally.     Tendon reflexes are diffusely suppressed but symmetric.  Toes are mute bilaterally.  Physical Exam    Assessment/Plan:     Diagnoses and all orders for this visit:    1. Dementia without behavioral disturbance (Primary)    Other orders  -     donepezil (Aricept) 10 MG tablet; Take 1 tablet by mouth Every Morning.  Dispense: 90 tablet; Refill: 3     Doing well since last visit.  She is tolerating her Aricept well and her agitation is almost completely resolved.  She does have  diarrhea but this was pre-existing.  I am titrating her Aricept to 10 mg daily and discussed possible side effects including GI side effects with her and her daughter.  I have asked him to call with problems or questions.  Otherwise we will see her back in 6 months time.  She still living alone but has excellent family support to help her with activities of daily living.

## 2024-08-30 ENCOUNTER — HOSPITAL ENCOUNTER (EMERGENCY)
Facility: HOSPITAL | Age: 89
Discharge: HOME | End: 2024-08-30
Attending: EMERGENCY MEDICINE
Payer: MEDICARE

## 2024-08-30 VITALS
HEIGHT: 62 IN | SYSTOLIC BLOOD PRESSURE: 145 MMHG | HEART RATE: 83 BPM | RESPIRATION RATE: 16 BRPM | OXYGEN SATURATION: 94 % | BODY MASS INDEX: 29.01 KG/M2 | TEMPERATURE: 97.7 F | WEIGHT: 157.63 LBS | DIASTOLIC BLOOD PRESSURE: 87 MMHG

## 2024-08-30 DIAGNOSIS — N39.0 ACUTE UTI: Primary | ICD-10-CM

## 2024-08-30 LAB
ALBUMIN SERPL-MCNC: 3.7 G/DL (ref 3.5–5.2)
ALBUMIN/GLOB SERPL: 1.1 G/DL
ALP SERPL-CCNC: 80 U/L (ref 39–117)
ALT SERPL W P-5'-P-CCNC: 15 U/L (ref 1–33)
ANION GAP SERPL CALCULATED.3IONS-SCNC: 9.4 MMOL/L (ref 5–15)
AST SERPL-CCNC: 29 U/L (ref 1–32)
BACTERIA UR QL AUTO: ABNORMAL /HPF
BASOPHILS # BLD AUTO: 0.03 10*3/MM3 (ref 0–0.2)
BASOPHILS NFR BLD AUTO: 0.5 % (ref 0–1.5)
BILIRUB SERPL-MCNC: 0.5 MG/DL (ref 0–1.2)
BILIRUB UR QL STRIP: NEGATIVE
BUN SERPL-MCNC: 23 MG/DL (ref 8–23)
BUN/CREAT SERPL: 25.3 (ref 7–25)
CALCIUM SPEC-SCNC: 9.8 MG/DL (ref 8.2–9.6)
CHLORIDE SERPL-SCNC: 104 MMOL/L (ref 98–107)
CLARITY UR: CLEAR
CO2 SERPL-SCNC: 27.6 MMOL/L (ref 22–29)
COLOR UR: YELLOW
CREAT SERPL-MCNC: 0.91 MG/DL (ref 0.57–1)
D-LACTATE SERPL-SCNC: 0.9 MMOL/L (ref 0.5–2)
DEPRECATED RDW RBC AUTO: 44.1 FL (ref 37–54)
EGFRCR SERPLBLD CKD-EPI 2021: 58.6 ML/MIN/1.73
EOSINOPHIL # BLD AUTO: 0.02 10*3/MM3 (ref 0–0.4)
EOSINOPHIL NFR BLD AUTO: 0.3 % (ref 0.3–6.2)
ERYTHROCYTE [DISTWIDTH] IN BLOOD BY AUTOMATED COUNT: 13 % (ref 12.3–15.4)
GLOBULIN UR ELPH-MCNC: 3.3 GM/DL
GLUCOSE SERPL-MCNC: 112 MG/DL (ref 65–99)
GLUCOSE UR STRIP-MCNC: NEGATIVE MG/DL
HCT VFR BLD AUTO: 33.5 % (ref 34–46.6)
HGB BLD-MCNC: 11 G/DL (ref 12–15.9)
HGB UR QL STRIP.AUTO: NEGATIVE
HYALINE CASTS UR QL AUTO: ABNORMAL /LPF
IMM GRANULOCYTES # BLD AUTO: 0.01 10*3/MM3 (ref 0–0.05)
IMM GRANULOCYTES NFR BLD AUTO: 0.2 % (ref 0–0.5)
KETONES UR QL STRIP: NEGATIVE
LEUKOCYTE ESTERASE UR QL STRIP.AUTO: ABNORMAL
LYMPHOCYTES # BLD AUTO: 2.01 10*3/MM3 (ref 0.7–3.1)
LYMPHOCYTES NFR BLD AUTO: 30.6 % (ref 19.6–45.3)
MAGNESIUM SERPL-MCNC: 2.1 MG/DL (ref 1.7–2.3)
MCH RBC QN AUTO: 30.3 PG (ref 26.6–33)
MCHC RBC AUTO-ENTMCNC: 32.8 G/DL (ref 31.5–35.7)
MCV RBC AUTO: 92.3 FL (ref 79–97)
MONOCYTES # BLD AUTO: 0.61 10*3/MM3 (ref 0.1–0.9)
MONOCYTES NFR BLD AUTO: 9.3 % (ref 5–12)
NEUTROPHILS NFR BLD AUTO: 3.89 10*3/MM3 (ref 1.7–7)
NEUTROPHILS NFR BLD AUTO: 59.1 % (ref 42.7–76)
NITRITE UR QL STRIP: POSITIVE
NRBC BLD AUTO-RTO: 0 /100 WBC (ref 0–0.2)
PH UR STRIP.AUTO: 7.5 [PH] (ref 5–8)
PLATELET # BLD AUTO: 224 10*3/MM3 (ref 140–450)
PMV BLD AUTO: 9.6 FL (ref 6–12)
POTASSIUM SERPL-SCNC: 3.7 MMOL/L (ref 3.5–5.2)
PROT SERPL-MCNC: 7 G/DL (ref 6–8.5)
PROT UR QL STRIP: NEGATIVE
QT INTERVAL: 427 MS
QTC INTERVAL: 437 MS
RBC # BLD AUTO: 3.63 10*6/MM3 (ref 3.77–5.28)
RBC # UR STRIP: ABNORMAL /HPF
REF LAB TEST METHOD: ABNORMAL
SODIUM SERPL-SCNC: 141 MMOL/L (ref 136–145)
SP GR UR STRIP: 1.02 (ref 1–1.03)
SQUAMOUS #/AREA URNS HPF: ABNORMAL /HPF
TROPONIN T SERPL HS-MCNC: 33 NG/L
UROBILINOGEN UR QL STRIP: ABNORMAL
WBC # UR STRIP: ABNORMAL /HPF
WBC NRBC COR # BLD AUTO: 6.57 10*3/MM3 (ref 3.4–10.8)

## 2024-08-30 PROCEDURE — 99283 EMERGENCY DEPT VISIT LOW MDM: CPT

## 2024-08-30 PROCEDURE — 81001 URINALYSIS AUTO W/SCOPE: CPT | Performed by: EMERGENCY MEDICINE

## 2024-08-30 PROCEDURE — 84484 ASSAY OF TROPONIN QUANT: CPT

## 2024-08-30 PROCEDURE — 36415 COLL VENOUS BLD VENIPUNCTURE: CPT

## 2024-08-30 PROCEDURE — 25010000002 CEFTRIAXONE PER 250 MG: Performed by: EMERGENCY MEDICINE

## 2024-08-30 PROCEDURE — 93005 ELECTROCARDIOGRAM TRACING: CPT

## 2024-08-30 PROCEDURE — 83735 ASSAY OF MAGNESIUM: CPT

## 2024-08-30 PROCEDURE — 85025 COMPLETE CBC W/AUTO DIFF WBC: CPT

## 2024-08-30 PROCEDURE — 83605 ASSAY OF LACTIC ACID: CPT | Performed by: EMERGENCY MEDICINE

## 2024-08-30 PROCEDURE — 80053 COMPREHEN METABOLIC PANEL: CPT

## 2024-08-30 PROCEDURE — 96365 THER/PROPH/DIAG IV INF INIT: CPT

## 2024-08-30 RX ORDER — CEPHALEXIN 500 MG/1
500 CAPSULE ORAL 3 TIMES DAILY
Qty: 21 CAPSULE | Refills: 0 | Status: SHIPPED | OUTPATIENT
Start: 2024-08-30

## 2024-08-30 RX ADMIN — CEFTRIAXONE SODIUM 1000 MG: 1 INJECTION, POWDER, FOR SOLUTION INTRAMUSCULAR; INTRAVENOUS at 17:00

## 2024-08-30 NOTE — ED PROVIDER NOTES
Time: 1:09 PM EDT  Date of encounter:  8/30/2024  Independent Historian/Clinical History and Information was obtained by:   Patient and Family    History is limited by: N/A    Chief Complaint   Patient presents with    Hypotension     Home health nurse sent her to the ER for low blood pressure.          History of Present Illness:  Patient is a 89 y.o. year old female who presents to the emergency department for evaluation of low blood pressure when evaluated by her home health nurse today.  Patient is unsure what the blood pressure reading was.  Patient reports blood pressure was also low last week and that her blood pressure medications were recently decreased by her doctor due to low blood pressure.  Denies any pain, shortness of breath, dizziness, vomiting.  Reports chronic diarrhea.     Patient Care Team  Primary Care Provider: Luz Marina Guerrero APRN    Past Medical History:     Allergies   Allergen Reactions    Sulfa Antibiotics Rash     Past Medical History:   Diagnosis Date    Acute bacterial sialadenitis 05/09/2023    Arthritis     Broken shoulder 1960    Bronchitis     Bunion     Cancer, skin, squamous cell     Cerumen impaction     CHF (congestive heart failure)     Condition not found     Ulcer    Corns and callus     Hallux valgus of left foot 03/15/2018    Hallux valgus of right foot 03/15/2018    Hammer toe     Head injury 1960    head injury from falling off back of truck    Hearing loss     History of kidney problems     History of total knee arthroplasty, left 05/07/2018 11/03/2018    Hyperlipidemia     Hypertension     Ingrowing toenail     Kidney disease     Numbness in feet     Obesity     Primary osteoarthritis of left knee 09/11/2017    Sinus congestion     Tinea unguium      Past Surgical History:   Procedure Laterality Date    CORONARY ANGIOPLASTY WITH STENT PLACEMENT  2006    OTHER SURGICAL HISTORY      Artificial Joints/Limbs    SHOULDER SURGERY Left 1963    SKIN CANCER EXCISION       TOTAL KNEE ARTHROPLASTY Left 1983    TOTAL KNEE ARTHROPLASTY Right 2004     Family History   Problem Relation Age of Onset    Arthritis Mother     Stroke Mother     Heart disease Mother     Diabetes Mother         Mellitus    Arthritis Father     Heart disease Father     Seizures Sister     Colon cancer Brother 50       Home Medications:  Prior to Admission medications    Medication Sig Start Date End Date Taking? Authorizing Provider   amLODIPine (NORVASC) 10 MG tablet  4/26/22   Kayleen Barboza MD   atorvastatin (LIPITOR) 40 MG tablet  6/8/21   Kayleen Barboza MD   Calcium + Vitamin D3 600-10 MG-MCG tablet per tablet Take 1 tablet by mouth Daily.  Patient not taking: Reported on 5/23/2024 2/2/24   Kayleen Barboza MD   Calcium Carbonate-Vitamin D (Calcium-Vitamin D) 600-125 MG-UNIT tablet Calcium 600 + D(3) 600-125 mg-unit oral tablet take 1 tablet by oral route daily   Active    Kayleen Barboza MD   Cholecalciferol 125 MCG (5000 UT) tablet Vitamin D3 125 mcg (5,000 unit) oral tablet take 1 tablet by oral route daily   Active  Patient not taking: Reported on 5/23/2024    Kayleen Barboza MD   Diclofenac Sodium (VOLTAREN) 1 % gel gel Apply 4 g topically to the appropriate area as directed 4 (Four) Times a Day As Needed (pain and swelling). 7/18/22   Ester Jimenes APRN   donepezil (Aricept) 10 MG tablet Take 1 tablet by mouth Every Morning. 7/22/24 7/22/25  Maciej Ricardo MD   famotidine (PEPCID) 40 MG tablet Take 1 tablet by mouth every night at bedtime. 5/6/24   Kayleen Barboza MD   furosemide (LASIX) 40 MG tablet 1qd 12/6/22   Kayleen Barboza MD   metoprolol succinate XL (TOPROL-XL) 25 MG 24 hr tablet  6/23/21   Kayleen Barboza MD   Multiple Vitamins-Minerals (PRESERVISION AREDS 2+MULTI VIT PO) PreserVision AREDS-2 371-903-05-1 mg-unit-mg-mg oral capsule take 1 capsule by oral route daily   Active  Patient not taking: Reported on 5/23/2024     "Kayleen Barboza MD   Multivitamin tablet tablet Take 1 tablet by mouth Daily.  Patient not taking: Reported on 5/23/2024 2/2/24   Kayleen Barboza MD   multivitamin with minerals tablet tablet 1qd  Patient not taking: Reported on 5/23/2024    Kayleen Barboza MD   multivitamins-minerals (PRESERVISION AREDS 2) capsule capsule Take 1 capsule by mouth Every 12 (Twelve) Hours. 5/6/24   Kayleen Barboza MD   potassium chloride (K-DUR,KLOR-CON) 10 MEQ CR tablet 2 (Two) Times a Day. 4/26/22   Kayleen Barboza MD   valsartan (DIOVAN) 40 MG tablet  4/9/24   Kayleen Barboza MD        Social History:   Social History     Tobacco Use    Smoking status: Never     Passive exposure: Past    Smokeless tobacco: Never   Vaping Use    Vaping status: Never Used   Substance Use Topics    Alcohol use: Never    Drug use: Never         Review of Systems:  Review of Systems   Constitutional:  Negative for chills and fever.   HENT:  Negative for congestion, rhinorrhea and sore throat.    Eyes:  Negative for pain and visual disturbance.   Respiratory:  Negative for apnea, cough, chest tightness and shortness of breath.    Cardiovascular:  Negative for chest pain and palpitations.   Gastrointestinal:  Positive for diarrhea. Negative for abdominal pain, nausea and vomiting.   Genitourinary:  Negative for difficulty urinating and dysuria.   Musculoskeletal:  Negative for joint swelling and myalgias.   Skin:  Negative for color change.   Neurological:  Negative for dizziness, seizures and headaches.   Psychiatric/Behavioral: Negative.     All other systems reviewed and are negative.       Physical Exam:  /87   Pulse 83   Temp 97.7 °F (36.5 °C) (Oral)   Resp 16   Ht 157.5 cm (62\")   Wt 71.5 kg (157 lb 10.1 oz)   SpO2 94%   BMI 28.83 kg/m²         Physical Exam  Vitals and nursing note reviewed.   Constitutional:       General: She is not in acute distress.     Appearance: Normal appearance. She is not " toxic-appearing.   HENT:      Head: Normocephalic and atraumatic.      Jaw: There is normal jaw occlusion.      Mouth/Throat:      Mouth: Mucous membranes are moist.   Eyes:      General: Lids are normal.      Extraocular Movements: Extraocular movements intact.      Conjunctiva/sclera: Conjunctivae normal.      Pupils: Pupils are equal, round, and reactive to light.   Cardiovascular:      Rate and Rhythm: Normal rate and regular rhythm.      Pulses: Normal pulses.      Heart sounds: Normal heart sounds.   Pulmonary:      Effort: Pulmonary effort is normal. No respiratory distress.      Breath sounds: Normal breath sounds. No wheezing or rhonchi.   Abdominal:      General: Abdomen is flat. There is no distension.      Palpations: Abdomen is soft.      Tenderness: There is no abdominal tenderness. There is no guarding or rebound.   Musculoskeletal:         General: Normal range of motion.      Cervical back: Normal range of motion and neck supple.      Right lower leg: No edema.      Left lower leg: No edema.   Skin:     General: Skin is warm and dry.   Neurological:      General: No focal deficit present.      Mental Status: She is alert and oriented to person, place, and time. Mental status is at baseline.   Psychiatric:         Mood and Affect: Mood normal.         Behavior: Behavior normal.                      Procedures:  Procedures      Medical Decision Making:      Comorbidities that affect care:    Hypertension    External Notes reviewed:    Previous Clinic Note: Patient was seen in clinic for dementia      The following orders were placed and all results were independently analyzed by me:  Orders Placed This Encounter   Procedures    Comprehensive Metabolic Panel    Single High Sensitivity Troponin T    Magnesium    CBC Auto Differential    Lactic Acid, Plasma    Urinalysis With Microscopic If Indicated (No Culture) - Urine, Clean Catch    Urinalysis, Microscopic Only - Urine, Clean Catch    ECG 12 Lead  Other; hypotension    CBC & Differential       Medications Given in the Emergency Department:  Medications   cefTRIAXone (ROCEPHIN) 1,000 mg in sodium chloride 0.9 % 100 mL IVPB-VTB (1,000 mg Intravenous New Bag 8/30/24 1700)        ED Course:    The patient was initially evaluated in the triage area where orders were placed. The patient was later dispositioned by Mery Bedolla MD.      The patient was advised to stay for completion of workup which includes but is not limited to communication of labs and radiological results, reassessment and plan. The patient was advised that leaving prior to disposition by a provider could result in critical findings that are not communicated to the patient.     ED Course as of 08/30/24 1729   Fri Aug 30, 2024   1316   --- PROVIDER IN TRIAGE NOTE ---    The patient was evaluated by Mali hernández in triage. Orders were placed and the patient is currently awaiting disposition.      [CE]      ED Course User Index  [CE] Mali Noguera P, MELVIN       Labs:    Lab Results (last 24 hours)       Procedure Component Value Units Date/Time    Comprehensive Metabolic Panel [[id number]]  (Abnormal) Collected: 08/30/24 1318    Specimen: Blood Updated: 08/30/24 1400     Glucose 112 mg/dL      BUN 23 mg/dL      Creatinine 0.91 mg/dL      Sodium 141 mmol/L      Potassium 3.7 mmol/L      Chloride 104 mmol/L      CO2 27.6 mmol/L      Calcium 9.8 mg/dL      Total Protein 7.0 g/dL      Albumin 3.7 g/dL      ALT (SGPT) 15 U/L      AST (SGOT) 29 U/L      Alkaline Phosphatase 80 U/L      Total Bilirubin 0.5 mg/dL      Globulin 3.3 gm/dL      A/G Ratio 1.1 g/dL      BUN/Creatinine Ratio 25.3     Anion Gap 9.4 mmol/L      eGFR 58.6 mL/min/1.73     Narrative:      GFR Normal >60  Chronic Kidney Disease <60  Kidney Failure <15    The GFR formula is only valid for adults with stable renal function between ages 18 and 70.    CBC & Differential [[id number]]  (Abnormal) Collected: 08/30/24 1318     Specimen: Blood Updated: 08/30/24 1332    Narrative:      The following orders were created for panel order CBC & Differential.  Procedure                               Abnormality         Status                     ---------                               -----------         ------                     CBC Auto Differential[[id number]]        Abnormal            Final result                 Please view results for these tests on the individual orders.    Single High Sensitivity Troponin T [[id number]]  (Abnormal) Collected: 08/30/24 1318    Specimen: Blood Updated: 08/30/24 1400     HS Troponin T 33 ng/L     Narrative:      High Sensitive Troponin T Reference Range:  <14.0 ng/L- Negative Female for AMI  <22.0 ng/L- Negative Male for AMI  >=14 - Abnormal Female indicating possible myocardial injury.  >=22 - Abnormal Male indicating possible myocardial injury.   Clinicians would have to utilize clinical acumen, EKG, Troponin, and serial changes to determine if it is an Acute Myocardial Infarction or myocardial injury due to an underlying chronic condition.         Magnesium [[id number]]  (Normal) Collected: 08/30/24 1318    Specimen: Blood Updated: 08/30/24 1400     Magnesium 2.1 mg/dL     CBC Auto Differential [[id number]]  (Abnormal) Collected: 08/30/24 1318    Specimen: Blood Updated: 08/30/24 1332     WBC 6.57 10*3/mm3      RBC 3.63 10*6/mm3      Hemoglobin 11.0 g/dL      Hematocrit 33.5 %      MCV 92.3 fL      MCH 30.3 pg      MCHC 32.8 g/dL      RDW 13.0 %      RDW-SD 44.1 fl      MPV 9.6 fL      Platelets 224 10*3/mm3      Neutrophil % 59.1 %      Lymphocyte % 30.6 %      Monocyte % 9.3 %      Eosinophil % 0.3 %      Basophil % 0.5 %      Immature Grans % 0.2 %      Neutrophils, Absolute 3.89 10*3/mm3      Lymphocytes, Absolute 2.01 10*3/mm3      Monocytes, Absolute 0.61 10*3/mm3      Eosinophils, Absolute 0.02 10*3/mm3      Basophils, Absolute 0.03 10*3/mm3      Immature Grans, Absolute 0.01 10*3/mm3      nRBC 0.0  /100 WBC     Lactic Acid, Plasma [[id number]]  (Normal) Collected: 08/30/24 1430    Specimen: Blood Updated: 08/30/24 1524     Lactate 0.9 mmol/L     Urinalysis With Microscopic If Indicated (No Culture) - Urine, Clean Catch [[id number]]  (Abnormal) Collected: 08/30/24 1519    Specimen: Urine, Clean Catch Updated: 08/30/24 1530     Color, UA Yellow     Appearance, UA Clear     pH, UA 7.5     Specific Gravity, UA 1.016     Glucose, UA Negative     Ketones, UA Negative     Bilirubin, UA Negative     Blood, UA Negative     Protein, UA Negative     Leuk Esterase, UA Small (1+)     Nitrite, UA Positive     Urobilinogen, UA 1.0 E.U./dL    Urinalysis, Microscopic Only - Urine, Clean Catch [[id number]]  (Abnormal) Collected: 08/30/24 1519    Specimen: Urine, Clean Catch Updated: 08/30/24 1530     RBC, UA 0-2 /HPF      WBC, UA 3-5 /HPF      Bacteria, UA 4+ /HPF      Squamous Epithelial Cells, UA 0-2 /HPF      Hyaline Casts, UA 0-2 /LPF      Methodology Automated Microscopy             Imaging:    No Radiology Exams Resulted Within Past 24 Hours      Differential Diagnosis and Discussion:      Metabolic: Differential diagnosis includes but is not limited to hypertension, hyperglycemia, hyperkalemia, hypocalcemia, metabolic acidosis, hypokalemia, hypoglycemia, malnutrition, hypothyroidism, hyperthyroidism, and adrenal insufficiency.     All labs were reviewed and interpreted by me.  All X-rays impressions were independently interpreted by me.    MDM     The patient´s CBC that was reviewed and interpreted by me shows no abnormalities of critical concern. Of note, there is no anemia requiring a blood transfusion and the platelet count is acceptable.  The patient´s CMP that was reviewed and interpretted by me shows no abnormalities of critical concern. Of note, the patient´s sodium and potassium are acceptable. The patient´s liver enzymes are unremarkable. The patient´s renal function (creatinine) is preserved. The patient has a  normal anion gap.  Urinalysis shows a nitrite positive UTI.  Based on the results of the patient's urinalysis and urinary complaints, signs, symptoms, and diagnostic testing is consistent with a urinary tract infection. Patient is resting comfortably, is alert, and is in no distress. The repeat examination is unremarkable and benign. The patient has no signs of urosepsis. The patient was started on antibiotics in the emergency department and will be discharged with antibiotics as an outpatient. The patient was counseled to return to the ER for fever >100.5, intractable pain or vomiting, or any other concerns that the may have. The patient has expressed a clear and thorough understanding and agreed to follow up as instructed.          Patient Care Considerations:    CT ABDOMEN AND PELVIS: I considered ordering a CT scan of the abdomen and pelvis however abdomen is soft and nontender.      Consultants/Shared Management Plan:    None    Social Determinants of Health:    Patient has presented with family members who are responsible, reliable and will ensure follow up care.      Disposition and Care Coordination:    Discharged: I considered escalation of care by admitting this patient to the hospital, however patient has no signs of urosepsis and is stable and suitable for discharge.    I have explained the patient´s condition, diagnoses and treatment plan based on the information available to me at this time. I have answered questions and addressed any concerns. The patient has a good  understanding of the patient´s diagnosis, condition, and treatment plan as can be expected at this point. The vital signs have been stable. The patient´s condition is stable and appropriate for discharge from the emergency department.      The patient will pursue further outpatient evaluation with the primary care physician or other designated or consulting physician as outlined in the discharge instructions. They are agreeable to this  plan of care and follow-up instructions have been explained in detail. The patient has received these instructions in written format and has expressed an understanding of the discharge instructions. The patient is aware that any significant change in condition or worsening of symptoms should prompt an immediate return to this or the closest emergency department or call to 911.  I have explained discharge medications and the need for follow up with the patient/caretakers. This was also printed in the discharge instructions. Patient was discharged with the following medications and follow up:      Medication List        New Prescriptions      cephalexin 500 MG capsule  Commonly known as: KEFLEX  Take 1 capsule by mouth 3 (Three) Times a Day.               Where to Get Your Medications        These medications were sent to [facility] - [place], [place] - [address] - [phone]  - [phone]  [address] [facility] [place] [address]      Phone: [phone]   cephalexin 500 MG capsule      Luz Marina Guerrero, APRN  [address] [facility] [id number] [phone]    In 2 days         Final diagnoses:   Acute UTI        ED Disposition       ED Disposition   Discharge    Condition   Stable    Comment   --               This medical record created using voice recognition software.             Mery Bedolla MD  08/30/24 5340

## 2024-09-07 LAB
QT INTERVAL: 427 MS
QTC INTERVAL: 437 MS

## 2024-09-17 ENCOUNTER — OFFICE VISIT (OUTPATIENT)
Dept: PODIATRY | Facility: CLINIC | Age: 89
End: 2024-09-17
Payer: MEDICARE

## 2024-09-17 VITALS
OXYGEN SATURATION: 97 % | SYSTOLIC BLOOD PRESSURE: 132 MMHG | WEIGHT: 157 LBS | DIASTOLIC BLOOD PRESSURE: 69 MMHG | TEMPERATURE: 98.6 F | HEIGHT: 62 IN | HEART RATE: 51 BPM | BODY MASS INDEX: 28.89 KG/M2

## 2024-09-17 DIAGNOSIS — M20.11 VALGUS DEFORMITY OF BOTH GREAT TOES: ICD-10-CM

## 2024-09-17 DIAGNOSIS — M79.671 FOOT PAIN, BILATERAL: ICD-10-CM

## 2024-09-17 DIAGNOSIS — M20.42 HAMMER TOES OF BOTH FEET: ICD-10-CM

## 2024-09-17 DIAGNOSIS — R26.2 DIFFICULTY WALKING: Primary | ICD-10-CM

## 2024-09-17 DIAGNOSIS — M79.672 FOOT PAIN, BILATERAL: ICD-10-CM

## 2024-09-17 DIAGNOSIS — L60.0 ONYCHOCRYPTOSIS: ICD-10-CM

## 2024-09-17 DIAGNOSIS — M20.41 HAMMER TOES OF BOTH FEET: ICD-10-CM

## 2024-09-17 DIAGNOSIS — M20.12 VALGUS DEFORMITY OF BOTH GREAT TOES: ICD-10-CM

## 2024-09-17 DIAGNOSIS — M20.10 VALGUS DEFORMITY OF GREAT TOE, UNSPECIFIED LATERALITY: ICD-10-CM

## 2024-09-17 DIAGNOSIS — B35.1 ONYCHOMYCOSIS: ICD-10-CM

## 2024-09-17 PROCEDURE — 11721 DEBRIDE NAIL 6 OR MORE: CPT | Performed by: PODIATRIST

## 2024-09-17 PROCEDURE — 1159F MED LIST DOCD IN RCRD: CPT | Performed by: PODIATRIST

## 2024-09-17 PROCEDURE — 1160F RVW MEDS BY RX/DR IN RCRD: CPT | Performed by: PODIATRIST

## 2024-09-17 RX ORDER — POTASSIUM CHLORIDE 750 MG/1
TABLET, EXTENDED RELEASE ORAL
COMMUNITY
Start: 2024-08-26

## 2024-10-02 ENCOUNTER — OFFICE VISIT (OUTPATIENT)
Dept: CARDIOLOGY | Facility: CLINIC | Age: 89
End: 2024-10-02
Payer: MEDICARE

## 2024-10-02 VITALS
BODY MASS INDEX: 28.52 KG/M2 | SYSTOLIC BLOOD PRESSURE: 117 MMHG | WEIGHT: 155 LBS | DIASTOLIC BLOOD PRESSURE: 50 MMHG | HEART RATE: 53 BPM | HEIGHT: 62 IN

## 2024-10-02 DIAGNOSIS — I50.32 CHRONIC HEART FAILURE WITH PRESERVED EJECTION FRACTION (HFPEF): ICD-10-CM

## 2024-10-02 DIAGNOSIS — I10 ESSENTIAL HYPERTENSION: Primary | ICD-10-CM

## 2024-10-02 DIAGNOSIS — Z98.61 CAD S/P PERCUTANEOUS CORONARY ANGIOPLASTY: ICD-10-CM

## 2024-10-02 DIAGNOSIS — E78.5 HYPERLIPIDEMIA LDL GOAL <70: ICD-10-CM

## 2024-10-02 DIAGNOSIS — I25.10 CAD S/P PERCUTANEOUS CORONARY ANGIOPLASTY: ICD-10-CM

## 2024-10-02 NOTE — PROGRESS NOTES
Chief Complaint  Follow-up, Coronary Artery Disease, Hypertension, and Hyperlipidemia    Subjective    Patient has had some issues with blood pressure running on the lower side in the 100's and some dizziness with standing symptoms . No change in breathing and has lost about 14 lbs since last visit.   Past Medical History:   Diagnosis Date    Acute bacterial sialadenitis 05/09/2023    Arthritis     Broken shoulder 1960    Bronchitis     Bunion     Cancer, skin, squamous cell     Cerumen impaction     CHF (congestive heart failure)     Condition not found     Ulcer    Corns and callus     Hallux valgus of left foot 03/15/2018    Hallux valgus of right foot 03/15/2018    Hammer toe     Head injury 1960    head injury from falling off back of truck    Hearing loss     History of kidney problems     History of total knee arthroplasty, left 05/07/2018 11/03/2018    Hyperlipidemia     Hypertension     Ingrowing toenail     Kidney disease     Numbness in feet     Obesity     Primary osteoarthritis of left knee 09/11/2017    Sinus congestion     Tinea unguium          Current Outpatient Medications:     atorvastatin (LIPITOR) 40 MG tablet, , Disp: , Rfl:     Calcium Carbonate-Vitamin D (Calcium-Vitamin D) 600-125 MG-UNIT tablet, Calcium 600 + D(3) 600-125 mg-unit oral tablet take 1 tablet by oral route daily   Active, Disp: , Rfl:     Diclofenac Sodium (VOLTAREN) 1 % gel gel, Apply 4 g topically to the appropriate area as directed 4 (Four) Times a Day As Needed (pain and swelling)., Disp: 150 g, Rfl: 0    donepezil (Aricept) 10 MG tablet, Take 1 tablet by mouth Every Morning., Disp: 90 tablet, Rfl: 3    famotidine (PEPCID) 40 MG tablet, Take 1 tablet by mouth every night at bedtime., Disp: , Rfl:     furosemide (LASIX) 40 MG tablet, 1qd, Disp: , Rfl:     metoprolol succinate XL (TOPROL-XL) 25 MG 24 hr tablet, , Disp: , Rfl:     Multiple Vitamins-Minerals (PRESERVISION AREDS 2 PO), TAKE ONE TABLET TWICE DAILY for 30, Disp: ,  "Rfl:     Multivitamin tablet tablet, Take 1 tablet by mouth Daily., Disp: , Rfl:     potassium chloride (K-DUR,KLOR-CON) 10 MEQ CR tablet, 2 (Two) Times a Day., Disp: , Rfl:     Medications Discontinued During This Encounter   Medication Reason    Multiple Vitamins-Minerals (PRESERVISION AREDS 2+MULTI VIT PO) *Re-Entry    Calcium + Vitamin D3 600-10 MG-MCG tablet per tablet *Re-Entry    cephalexin (KEFLEX) 500 MG capsule *Therapy completed    Cholecalciferol 125 MCG (5000 UT) tablet *Re-Entry    potassium chloride 10 MEQ CR tablet *Re-Entry    multivitamins-minerals (PRESERVISION AREDS 2) capsule capsule *Re-Entry    multivitamin with minerals tablet tablet *Re-Entry    amLODIPine (NORVASC) 10 MG tablet     valsartan (DIOVAN) 40 MG tablet      Allergies   Allergen Reactions    Sulfa Antibiotics Rash        Social History     Tobacco Use    Smoking status: Never     Passive exposure: Past    Smokeless tobacco: Never   Vaping Use    Vaping status: Never Used   Substance Use Topics    Alcohol use: Never    Drug use: Never       Family History   Problem Relation Age of Onset    Arthritis Mother     Stroke Mother     Heart disease Mother     Diabetes Mother         Mellitus    Arthritis Father     Heart disease Father     Seizures Sister     Colon cancer Brother 50        Objective     /50   Pulse 53   Ht 157.5 cm (62\")   Wt 70.3 kg (155 lb)   BMI 28.35 kg/m²       Physical Exam    General Appearance:   no acute distress  Alert and oriented x3  HENT:   lips not cyanotic  Atraumatic  Neck:  No jvd   supple  Respiratory:  no respiratory distress  normal breath sounds  no rales  Cardiovascular:  RRR  no S3, no S4   no murmur  no rub  Extremities  No cyanosis  lower extremity edema: none    Skin:   warm, dry  No rashes      Result Review :     No results found for: \"PROBNP\"  CMP          4/15/2024    09:57 8/30/2024    13:18   CMP   Glucose 98  112    BUN 14  23    Creatinine 0.88  0.91    EGFR 61.4  58.6    Sodium " "144  141    Potassium 4.5  3.7    Chloride 108  104    Calcium 9.9  9.8    Total Protein 7.2  7.0    Albumin 4.2  3.7    Globulin 3.0  3.3    Total Bilirubin 0.6  0.5    Alkaline Phosphatase 79  80    AST (SGOT) 37  29    ALT (SGPT) 23  15    Albumin/Globulin Ratio 1.4  1.1    BUN/Creatinine Ratio 15.9  25.3    Anion Gap 7.7  9.4      CBC w/diff          8/30/2024    13:18   CBC w/Diff   WBC 6.57    RBC 3.63    Hemoglobin 11.0    Hematocrit 33.5    MCV 92.3    MCH 30.3    MCHC 32.8    RDW 13.0    Platelets 224    Neutrophil Rel % 59.1    Immature Granulocyte Rel % 0.2    Lymphocyte Rel % 30.6    Monocyte Rel % 9.3    Eosinophil Rel % 0.3    Basophil Rel % 0.5       Lab Results   Component Value Date    TSH 2.960 04/15/2024      Lab Results   Component Value Date    FREET4 0.99 04/15/2024      No results found for: \"DDIMERQUANT\"  Magnesium   Date Value Ref Range Status   08/30/2024 2.1 1.7 - 2.3 mg/dL Final      No results found for: \"DIGOXIN\"   Lab Results   Component Value Date    TROPONINT 33 (H) 08/30/2024             No results found for: \"POCTROP\"                   Diagnoses and all orders for this visit:    1. Essential hypertension (Primary)  Assessment & Plan:  Patinet now with boderline low blood pressure and some symptoms of orthostasis improved but not resolved with holding amlodopine and decreasing valsartan. Recommend stopping valsartan       2. CAD S/P percutaneous coronary angioplasty  Assessment & Plan:  Patient is doing well no ongoing angina continue with chronic aspirin 81 mg daily      3. Chronic heart failure with preserved ejection fraction (HFpEF)    4. Hyperlipidemia LDL goal <70            Follow Up     Return in about 6 months (around 4/2/2025) for Follow with Stella Brar.          Patient was given instructions and counseling regarding her condition or for health maintenance advice. Please see specific information pulled into the AVS if appropriate.       "

## 2024-10-02 NOTE — ASSESSMENT & PLAN NOTE
Medhat now with boderline low blood pressure and some symptoms of orthostasis improved but not resolved with holding amlodopine and decreasing valsartan. Recommend stopping valsartan

## 2024-12-16 ENCOUNTER — OFFICE VISIT (OUTPATIENT)
Dept: PODIATRY | Facility: CLINIC | Age: 89
End: 2024-12-16
Payer: MEDICARE

## 2024-12-16 VITALS
HEIGHT: 62 IN | SYSTOLIC BLOOD PRESSURE: 152 MMHG | TEMPERATURE: 97.1 F | DIASTOLIC BLOOD PRESSURE: 64 MMHG | HEART RATE: 58 BPM | OXYGEN SATURATION: 100 % | WEIGHT: 155 LBS | BODY MASS INDEX: 28.52 KG/M2

## 2024-12-16 DIAGNOSIS — B35.1 ONYCHOMYCOSIS: Primary | ICD-10-CM

## 2024-12-16 DIAGNOSIS — M20.42 HAMMER TOES OF BOTH FEET: ICD-10-CM

## 2024-12-16 DIAGNOSIS — M79.672 FOOT PAIN, BILATERAL: ICD-10-CM

## 2024-12-16 DIAGNOSIS — L60.0 ONYCHOCRYPTOSIS: ICD-10-CM

## 2024-12-16 DIAGNOSIS — M20.12 VALGUS DEFORMITY OF BOTH GREAT TOES: ICD-10-CM

## 2024-12-16 DIAGNOSIS — M20.11 VALGUS DEFORMITY OF BOTH GREAT TOES: ICD-10-CM

## 2024-12-16 DIAGNOSIS — M20.10 VALGUS DEFORMITY OF GREAT TOE, UNSPECIFIED LATERALITY: ICD-10-CM

## 2024-12-16 DIAGNOSIS — M79.671 FOOT PAIN, BILATERAL: ICD-10-CM

## 2024-12-16 DIAGNOSIS — M20.41 HAMMER TOES OF BOTH FEET: ICD-10-CM

## 2024-12-16 DIAGNOSIS — R26.2 DIFFICULTY WALKING: ICD-10-CM

## 2024-12-16 PROCEDURE — 1159F MED LIST DOCD IN RCRD: CPT | Performed by: PODIATRIST

## 2024-12-16 PROCEDURE — 1160F RVW MEDS BY RX/DR IN RCRD: CPT | Performed by: PODIATRIST

## 2024-12-16 PROCEDURE — 11721 DEBRIDE NAIL 6 OR MORE: CPT | Performed by: PODIATRIST

## 2024-12-16 NOTE — PROGRESS NOTES
[facility] - PODIATRY    Today's Date: 12/16/24    Patient Name: Megha Hardy  MRN: [id number]  CSN: [id number]  PCP: Luz Marina Guerrero APRN, Last PCP Visit: 11/19/2024  Referring Provider: No ref. provider found    SUBJECTIVE     Chief Complaint   Patient presents with    Left Foot - Follow-up, Nail Problem    Right Foot - Follow-up, Nail Problem     HPI: Megha Hardy, a 89 y.o.female, comes to clinic.    New, Established, New Problem:  established   Location:  Toenails  Duration:   Greater than five years  Onset:  Gradual  Nature:  sore with palpation.  Stable, worsening, improving:   Stable  Aggravating factors:  Pain with shoe gear and ambulation.  Previous Treatment:  debridement    Medical changes: ER visit due to a fall, no fractures, no admission    Patient denies any fevers, chills, nausea, vomiting, shortness of breathe, nor any other constitutional signs nor symptoms.       Past Medical History:   Diagnosis Date    Acute bacterial sialadenitis 05/09/2023    Arthritis     Broken shoulder 1960    Bronchitis     Bunion     Cancer, skin, squamous cell     Cerumen impaction     CHF (congestive heart failure)     Condition not found     Ulcer    Corns and callus     Hallux valgus of left foot 03/15/2018    Hallux valgus of right foot 03/15/2018    Hammer toe     Head injury 1960    head injury from falling off back of truck    Hearing loss     History of kidney problems     History of total knee arthroplasty, left 05/07/2018 11/03/2018    Hyperlipidemia     Hypertension     Ingrowing toenail     Kidney disease     Numbness in feet     Obesity     Primary osteoarthritis of left knee 09/11/2017    Sinus congestion     Tinea unguium      Past Surgical History:   Procedure Laterality Date    CORONARY ANGIOPLASTY WITH STENT PLACEMENT  2006    OTHER SURGICAL HISTORY      Artificial Joints/Limbs    SHOULDER SURGERY Left 1963    SKIN CANCER EXCISION      TOTAL KNEE ARTHROPLASTY Left 1983     TOTAL KNEE ARTHROPLASTY Right 2004     Family History   Problem Relation Age of Onset    Arthritis Mother     Stroke Mother     Heart disease Mother     Diabetes Mother         Mellitus    Arthritis Father     Heart disease Father     Seizures Sister     Colon cancer Brother 50     Social History     Socioeconomic History    Marital status:    Tobacco Use    Smoking status: Never     Passive exposure: Past    Smokeless tobacco: Never   Vaping Use    Vaping status: Never Used   Substance and Sexual Activity    Alcohol use: Never    Drug use: Never    Sexual activity: Defer     Allergies   Allergen Reactions    Sulfa Antibiotics Rash     Current Outpatient Medications   Medication Sig Dispense Refill    atorvastatin (LIPITOR) 40 MG tablet       Calcium Carbonate-Vitamin D (Calcium-Vitamin D) 600-125 MG-UNIT tablet Calcium 600 + D(3) 600-125 mg-unit oral tablet take 1 tablet by oral route daily   Active      Diclofenac Sodium (VOLTAREN) 1 % gel gel Apply 4 g topically to the appropriate area as directed 4 (Four) Times a Day As Needed (pain and swelling). 150 g 0    donepezil (Aricept) 10 MG tablet Take 1 tablet by mouth Every Morning. 90 tablet 3    famotidine (PEPCID) 40 MG tablet Take 1 tablet by mouth every night at bedtime.      furosemide (LASIX) 40 MG tablet 1qd      metoprolol succinate XL (TOPROL-XL) 25 MG 24 hr tablet       Multiple Vitamins-Minerals (PRESERVISION AREDS 2 PO) TAKE ONE TABLET TWICE DAILY for 30      Multivitamin tablet tablet Take 1 tablet by mouth Daily.      potassium chloride (K-DUR,KLOR-CON) 10 MEQ CR tablet 2 (Two) Times a Day.      vitamin d 125 MCG (5000 UT) capsule 1 capsule Daily.       No current facility-administered medications for this visit.     Review of Systems   Constitutional: Negative.    Skin:         Painful toenails.   All other systems reviewed and are negative.      OBJECTIVE     Vitals:    12/16/24 0947   BP: 152/64   Pulse: 58   Temp: 97.1 °F (36.2 °C)    SpO2: 100%         Patient seen in no apparent distress.      PHYSICAL EXAM:     Foot/Ankle Exam    GENERAL  Appearance:  elderly and chronically ill  Orientation:  AAOx3  Affect:  appropriate  Gait:  antalgic  Assistance:  cane use  Right shoe gear: casual shoe  Left shoe gear: casual shoe    VASCULAR     Right Foot Vascularity   Dorsalis pedis:  2+  Posterior tibial:  2+  Skin temperature:  warm  Edema gradin+ and pitting  CFT:  < 3 seconds  Pedal hair growth:  Absent  Varicosities:  severe varicosities     Left Foot Vascularity   Dorsalis pedis:  2+  Posterior tibial:  2+  Skin temperature:  warm  Edema grading:  Pitting and 2+  CFT:  < 3 seconds  Pedal hair growth:  Absent  Varicosities:  severe varicosities     NEUROLOGIC     Right Foot Neurologic   Normal sensation    Light touch sensation: normal  Vibratory sensation: normal  Hot/Cold sensation: normal  Protective Sensation using [place] Monofilament:   Sites intact: 10  Sites tested: 10     Left Foot Neurologic   Normal sensation    Light touch sensation: normal  Vibratory sensation: normal  Hot/Cold sensation:  normal  Protective Sensation using [place] Monofilament:   Sites intact: 10  Sites tested: 10    MUSCULOSKELETAL     Right Foot Musculoskeletal   Hammertoe:  Second toe and third toe  Hallux valgus: Yes       Left Foot Musculoskeletal   Hammertoe:  Second toe and third toe  Hallux valgus: Yes      MUSCLE STRENGTH     Right Foot Muscle Strength   Foot dorsiflexion:  4-  Foot plantar flexion:  4-  Foot inversion:  4-  Foot eversion:  4-     Left Foot Muscle Strength   Foot dorsiflexion:  4-  Foot plantar flexion:  4-  Foot inversion:  4-  Foot eversion:  4-    RANGE OF MOTION     Right Foot Range of Motion   Foot and ankle ROM within normal limits       Left Foot Range of Motion   Foot and ankle ROM within normal limits      DERMATOLOGIC      Right Foot Dermatologic   Skin  Right foot skin is intact.   Nails  1.  Positive for  elongated, onychomycosis, abnormal thickness, subungual debris and ingrown toenail.  2.  Positive for elongated, onychomycosis, abnormal thickness, subungual debris and ingrown toenail.  3.  Positive for elongated, onychomycosis, abnormal thickness, subungual debris and ingrown toenail.  4.  Positive for elongated, onychomycosis, abnormal thickness, subungual debris and ingrown toenail.  5.  Positive for elongated, onychomycosis, abnormal thickness, subungual debris and ingrown toenail.     Left Foot Dermatologic   Skin  Left foot skin is intact.   Nails  1.  Positive for elongated, onychomycosis, abnormal thickness, subungual debris and ingrown toenail.  2.  Positive for elongated, onychomycosis, abnormal thickness, subungual debris and ingrown toenail.  3.  Positive for elongated, onychomycosis, abnormal thickness, subungual debris and ingrown toenail.  4.  Positive for elongated, onychomycosis, abnormally thick, subungual debris and ingrown toenail.  5.  Positive for elongated, onychomycosis, abnormally thick, subungual debris and ingrown toenail.    I have reexamined the patient the results are consistent with the previously documented exam.    ASSESSMENT/PLAN     Diagnoses and all orders for this visit:    1. Onychomycosis (Primary)    2. Foot pain, bilateral    3. Hammer toes of both feet    4. Valgus deformity of great toe, unspecified laterality    5. Onychocryptosis    6. Valgus deformity of both great toes    7. Difficulty walking      Comprehensive lower extremity examination and evaluation was performed.    Discussed findings and treatment plan including risks, benefits, and treatment options with patient in detail. Patient agreed with treatment plan.    Toenails:  1 through 5 bilaterally were debrided in thickness and length and then smoothed with a Dremel Tool.  Tolerated the procedure well without complications.    An After Visit Summary was printed and given to the patient at discharge, including (if  requested) any available informative/educational handouts regarding diagnosis, treatment, or medications. All questions were answered to patient/family satisfaction. Should symptoms fail to improve or worsen they agree to call or return to clinic or to go to the Emergency Department. Discussed the importance of following up with any needed screening tests/labs/specialist appointments and any requested follow-up recommended by me today. Importance of maintaining follow-up discussed and patient accepts that missed appointments can delay diagnosis and potentially lead to worsening of conditions.    Return in about 9 weeks (around 2/17/2025) for Toenail Care., or sooner if acute issues arise.  I have reviewed the assessment and plan and verified the accuracy of it. No changes to assessment and plan since the information was documented. Aidan Lundberg DPM 12/16/24     I have dictated this note utilizing Dragon Dictation.  Please note that portions of this note were completed with a voice recognition program.  Part of this note may be an electronic transcription/translation of spoken language to printed text using the Dragon Dictation System.      This document has been electronically signed by Aidan Lundberg DPM on December 16, 2024 09:54 EST

## 2025-02-26 ENCOUNTER — LAB REQUISITION (OUTPATIENT)
Dept: LAB | Facility: HOSPITAL | Age: OVER 89
End: 2025-02-26
Payer: MEDICARE

## 2025-02-26 DIAGNOSIS — R53.83 OTHER FATIGUE: ICD-10-CM

## 2025-02-26 DIAGNOSIS — R53.1 WEAKNESS: ICD-10-CM

## 2025-02-26 DIAGNOSIS — R21 RASH AND OTHER NONSPECIFIC SKIN ERUPTION: ICD-10-CM

## 2025-02-26 DIAGNOSIS — I50.9 HEART FAILURE, UNSPECIFIED: ICD-10-CM

## 2025-02-26 LAB
ALBUMIN SERPL-MCNC: 3.8 G/DL (ref 3.5–5.2)
ALBUMIN/GLOB SERPL: 1.2 G/DL
ALP SERPL-CCNC: 77 U/L (ref 39–117)
ALT SERPL W P-5'-P-CCNC: 17 U/L (ref 1–33)
ANION GAP SERPL CALCULATED.3IONS-SCNC: 8.1 MMOL/L (ref 5–15)
AST SERPL-CCNC: 37 U/L (ref 1–32)
BASOPHILS # BLD AUTO: 0.04 10*3/MM3 (ref 0–0.2)
BASOPHILS NFR BLD AUTO: 0.7 % (ref 0–1.5)
BILIRUB SERPL-MCNC: 0.4 MG/DL (ref 0–1.2)
BUN SERPL-MCNC: 12 MG/DL (ref 8–23)
BUN/CREAT SERPL: 14.8 (ref 7–25)
CALCIUM SPEC-SCNC: 9.8 MG/DL (ref 8.2–9.6)
CHLORIDE SERPL-SCNC: 104 MMOL/L (ref 98–107)
CO2 SERPL-SCNC: 27.9 MMOL/L (ref 22–29)
CREAT SERPL-MCNC: 0.81 MG/DL (ref 0.57–1)
DEPRECATED RDW RBC AUTO: 44.4 FL (ref 37–54)
EGFRCR SERPLBLD CKD-EPI 2021: 67.4 ML/MIN/1.73
EOSINOPHIL # BLD AUTO: 0.07 10*3/MM3 (ref 0–0.4)
EOSINOPHIL NFR BLD AUTO: 1.3 % (ref 0.3–6.2)
ERYTHROCYTE [DISTWIDTH] IN BLOOD BY AUTOMATED COUNT: 12.9 % (ref 12.3–15.4)
GLOBULIN UR ELPH-MCNC: 3.2 GM/DL
GLUCOSE SERPL-MCNC: 114 MG/DL (ref 65–99)
HCT VFR BLD AUTO: 36.6 % (ref 34–46.6)
HGB BLD-MCNC: 11.8 G/DL (ref 12–15.9)
IMM GRANULOCYTES # BLD AUTO: 0.01 10*3/MM3 (ref 0–0.05)
IMM GRANULOCYTES NFR BLD AUTO: 0.2 % (ref 0–0.5)
LYMPHOCYTES # BLD AUTO: 2.9 10*3/MM3 (ref 0.7–3.1)
LYMPHOCYTES NFR BLD AUTO: 51.8 % (ref 19.6–45.3)
MCH RBC QN AUTO: 30.4 PG (ref 26.6–33)
MCHC RBC AUTO-ENTMCNC: 32.2 G/DL (ref 31.5–35.7)
MCV RBC AUTO: 94.3 FL (ref 79–97)
MONOCYTES # BLD AUTO: 0.53 10*3/MM3 (ref 0.1–0.9)
MONOCYTES NFR BLD AUTO: 9.5 % (ref 5–12)
NEUTROPHILS NFR BLD AUTO: 2.05 10*3/MM3 (ref 1.7–7)
NEUTROPHILS NFR BLD AUTO: 36.5 % (ref 42.7–76)
NRBC BLD AUTO-RTO: 0 /100 WBC (ref 0–0.2)
PLATELET # BLD AUTO: 190 10*3/MM3 (ref 140–450)
PMV BLD AUTO: 10 FL (ref 6–12)
POTASSIUM SERPL-SCNC: 4.5 MMOL/L (ref 3.5–5.2)
PROT SERPL-MCNC: 7 G/DL (ref 6–8.5)
RBC # BLD AUTO: 3.88 10*6/MM3 (ref 3.77–5.28)
SODIUM SERPL-SCNC: 140 MMOL/L (ref 136–145)
WBC NRBC COR # BLD AUTO: 5.6 10*3/MM3 (ref 3.4–10.8)

## 2025-02-26 PROCEDURE — 85025 COMPLETE CBC W/AUTO DIFF WBC: CPT | Performed by: NURSE PRACTITIONER

## 2025-02-26 PROCEDURE — 80053 COMPREHEN METABOLIC PANEL: CPT | Performed by: NURSE PRACTITIONER

## 2025-02-26 PROCEDURE — 86787 VARICELLA-ZOSTER ANTIBODY: CPT | Performed by: NURSE PRACTITIONER

## 2025-02-27 LAB
VZV IGG SER QL IA: REACTIVE
VZV IGM SER QL IA: 1.55 INDEX (ref 0–0.9)

## 2025-03-10 ENCOUNTER — OFFICE VISIT (OUTPATIENT)
Dept: PODIATRY | Facility: CLINIC | Age: OVER 89
End: 2025-03-10
Payer: MEDICARE

## 2025-03-10 VITALS
BODY MASS INDEX: 28.35 KG/M2 | HEART RATE: 60 BPM | TEMPERATURE: 98 F | DIASTOLIC BLOOD PRESSURE: 72 MMHG | OXYGEN SATURATION: 97 % | SYSTOLIC BLOOD PRESSURE: 164 MMHG | WEIGHT: 155 LBS

## 2025-03-10 DIAGNOSIS — L60.0 ONYCHOCRYPTOSIS: ICD-10-CM

## 2025-03-10 DIAGNOSIS — M79.672 FOOT PAIN, BILATERAL: ICD-10-CM

## 2025-03-10 DIAGNOSIS — M20.12 VALGUS DEFORMITY OF BOTH GREAT TOES: ICD-10-CM

## 2025-03-10 DIAGNOSIS — M20.41 HAMMER TOES OF BOTH FEET: ICD-10-CM

## 2025-03-10 DIAGNOSIS — M20.42 HAMMER TOES OF BOTH FEET: ICD-10-CM

## 2025-03-10 DIAGNOSIS — M20.10 VALGUS DEFORMITY OF GREAT TOE, UNSPECIFIED LATERALITY: Primary | ICD-10-CM

## 2025-03-10 DIAGNOSIS — M20.11 VALGUS DEFORMITY OF BOTH GREAT TOES: ICD-10-CM

## 2025-03-10 DIAGNOSIS — B35.1 ONYCHOMYCOSIS: ICD-10-CM

## 2025-03-10 DIAGNOSIS — R26.2 DIFFICULTY WALKING: ICD-10-CM

## 2025-03-10 DIAGNOSIS — M79.671 FOOT PAIN, BILATERAL: ICD-10-CM

## 2025-03-10 NOTE — PROGRESS NOTES
[facility] - PODIATRY    Today's Date: 03/10/25    Patient Name: Megha Hardy  MRN: [id number]  CSN: [id number]  PCP: Luz Marina Guerrero APRN, Last PCP Visit: 7 March 2025  Referring Provider: No ref. provider found    SUBJECTIVE     Chief Complaint   Patient presents with    Left Foot - Follow-up, Nail Problem    Right Foot - Follow-up, Nail Problem     HPI: Megha Hardy, a 89 y.o.female, comes to clinic.    New, Established, New Problem:  established   Location:  Toenails  Duration:   Greater than five years  Onset:  Gradual  Nature:  sore with palpation.  Stable, worsening, improving:   Stable  Aggravating factors:  Pain with shoe gear and ambulation.  Previous Treatment:  debridement    Medical changes: None    Patient denies any fevers, chills, nausea, vomiting, shortness of breathe, nor any other constitutional signs nor symptoms.       Past Medical History:   Diagnosis Date    Acute bacterial sialadenitis 05/09/2023    Arthritis     Broken shoulder 1960    Bronchitis     Bunion     Cancer, skin, squamous cell     Cerumen impaction     CHF (congestive heart failure)     Condition not found     Ulcer    Corns and callus     Hallux valgus of left foot 03/15/2018    Hallux valgus of right foot 03/15/2018    Hammer toe     Head injury 1960    head injury from falling off back of truck    Hearing loss     History of kidney problems     History of total knee arthroplasty, left 05/07/2018 11/03/2018    Hyperlipidemia     Hypertension     Ingrowing toenail     Kidney disease     Numbness in feet     Obesity     Primary osteoarthritis of left knee 09/11/2017    Sinus congestion     Tinea unguium      Past Surgical History:   Procedure Laterality Date    CORONARY ANGIOPLASTY WITH STENT PLACEMENT  2006    OTHER SURGICAL HISTORY      Artificial Joints/Limbs    SHOULDER SURGERY Left 1963    SKIN CANCER EXCISION      TOTAL KNEE ARTHROPLASTY Left 1983    TOTAL KNEE ARTHROPLASTY Right 2004      Family History   Problem Relation Age of Onset    Arthritis Mother     Stroke Mother     Heart disease Mother     Diabetes Mother         Mellitus    Arthritis Father     Heart disease Father     Seizures Sister     Colon cancer Brother 50     Social History     Socioeconomic History    Marital status:    Tobacco Use    Smoking status: Never     Passive exposure: Past    Smokeless tobacco: Never   Vaping Use    Vaping status: Never Used   Substance and Sexual Activity    Alcohol use: Never    Drug use: Never    Sexual activity: Defer     Allergies   Allergen Reactions    Sulfa Antibiotics Rash     Current Outpatient Medications   Medication Sig Dispense Refill    atorvastatin (LIPITOR) 40 MG tablet       Calcium Carbonate-Vitamin D (Calcium-Vitamin D) 600-125 MG-UNIT tablet Calcium 600 + D(3) 600-125 mg-unit oral tablet take 1 tablet by oral route daily   Active      Diclofenac Sodium (VOLTAREN) 1 % gel gel Apply 4 g topically to the appropriate area as directed 4 (Four) Times a Day As Needed (pain and swelling). 150 g 0    donepezil (Aricept) 10 MG tablet Take 1 tablet by mouth Every Morning. 90 tablet 3    famotidine (PEPCID) 40 MG tablet Take 1 tablet by mouth every night at bedtime.      furosemide (LASIX) 40 MG tablet 1qd      metoprolol succinate XL (TOPROL-XL) 25 MG 24 hr tablet       Multiple Vitamins-Minerals (PRESERVISION AREDS 2 PO) TAKE ONE TABLET TWICE DAILY for 30      Multivitamin tablet tablet Take 1 tablet by mouth Daily.      potassium chloride (K-DUR,KLOR-CON) 10 MEQ CR tablet 2 (Two) Times a Day.      vitamin d 125 MCG (5000 UT) capsule 1 capsule Daily.       No current facility-administered medications for this visit.     Review of Systems   Constitutional: Negative.    Skin:         Painful toenails.   All other systems reviewed and are negative.      OBJECTIVE     Vitals:    03/10/25 1123   BP: 164/72   Pulse: 60   Temp: 98 °F (36.7 °C)   SpO2: 97%         Patient seen in no  apparent distress.      PHYSICAL EXAM:     Foot/Ankle Exam    GENERAL  Appearance:  elderly and chronically ill  Orientation:  AAOx3  Affect:  appropriate  Gait:  antalgic  Assistance:  cane use  Right shoe gear: casual shoe  Left shoe gear: casual shoe    VASCULAR     Right Foot Vascularity   Dorsalis pedis:  2+  Posterior tibial:  2+  Skin temperature:  warm  Edema gradin+ and pitting  CFT:  < 3 seconds  Pedal hair growth:  Absent  Varicosities:  severe varicosities     Left Foot Vascularity   Dorsalis pedis:  2+  Posterior tibial:  2+  Skin temperature:  warm  Edema grading:  Pitting and 2+  CFT:  < 3 seconds  Pedal hair growth:  Absent  Varicosities:  severe varicosities     NEUROLOGIC     Right Foot Neurologic   Normal sensation    Light touch sensation: normal  Vibratory sensation: normal  Hot/Cold sensation: normal  Protective Sensation using [place] Monofilament:   Sites intact: 10  Sites tested: 10     Left Foot Neurologic   Normal sensation    Light touch sensation: normal  Vibratory sensation: normal  Hot/Cold sensation:  normal  Protective Sensation using [place] Monofilament:   Sites intact: 10  Sites tested: 10    MUSCULOSKELETAL     Right Foot Musculoskeletal   Hammertoe:  Second toe and third toe  Hallux valgus: Yes       Left Foot Musculoskeletal   Hamm[place]oe:  Second toe and third toe  Hallux valgus: Yes      MUSCLE STRENGTH     Right Foot Muscle Strength   Foot dorsiflexion:  4-  Foot plantar flexion:  4-  Foot inversion:  4-  Foot eversion:  4-     Left Foot Muscle Strength   Foot dorsiflexion:  4-  Foot plantar flexion:  4-  Foot inversion:  4-  Foot eversion:  4-    RANGE OF MOTION     Right Foot Range of Motion   Foot and ankle ROM within normal limits       Left Foot Range of Motion   Foot and ankle ROM within normal limits      DERMATOLOGIC      Right Foot Dermatologic   Skin  Right foot skin is intact.   Nails  1.  Positive for elongated, onychomycosis, abnormal  thickness, subungual debris and ingrown toenail.  2.  Positive for elongated, onychomycosis, abnormal thickness, subungual debris and ingrown toenail.  3.  Positive for elongated, onychomycosis, abnormal thickness, subungual debris and ingrown toenail.  4.  Positive for elongated, onychomycosis, abnormal thickness, subungual debris and ingrown toenail.  5.  Positive for elongated, onychomycosis, abnormal thickness, subungual debris and ingrown toenail.     Left Foot Dermatologic   Skin  Left foot skin is intact.   Nails  1.  Positive for elongated, onychomycosis, abnormal thickness, subungual debris and ingrown toenail.  2.  Positive for elongated, onychomycosis, abnormal thickness, subungual debris and ingrown toenail.  3.  Positive for elongated, onychomycosis, abnormal thickness, subungual debris and ingrown toenail.  4.  Positive for elongated, onychomycosis, abnormally thick, subungual debris and ingrown toenail.  5.  Positive for elongated, onychomycosis, abnormally thick, subungual debris and ingrown toenail.    I have reexamined the patient the results are consistent with the previously documented exam.    ASSESSMENT/PLAN     Diagnoses and all orders for this visit:    1. Valgus deformity of great toe, unspecified laterality (Primary)    2. Hammer toes of both feet    3. Foot pain, bilateral    4. Onychomycosis    5. Onychocryptosis    6. Difficulty walking    7. Valgus deformity of both great toes      Comprehensive lower extremity examination and evaluation was performed.    Discussed findings and treatment plan including risks, benefits, and treatment options with patient in detail. Patient agreed with treatment plan.    Toenails:  1 through 5 bilaterally were debrided in thickness and length and then smoothed with a Dremel Tool.  Tolerated the procedure well without complications.    An After Visit Summary was printed and given to the patient at discharge, including (if requested) any available  informative/educational handouts regarding diagnosis, treatment, or medications. All questions were answered to patient/family satisfaction. Should symptoms fail to improve or worsen they agree to call or return to clinic or to go to the Emergency Department. Discussed the importance of following up with any needed screening tests/labs/specialist appointments and any requested follow-up recommended by me today. Importance of maintaining follow-up discussed and patient accepts that missed appointments can delay diagnosis and potentially lead to worsening of conditions.    Return for Toenail Care., or sooner if acute issues arise.  I have reviewed the assessment and plan and verified the accuracy of it. No changes to assessment and plan since the information was documented. Aidan Lundberg DPM 03/10/25     I have dictated this note utilizing Dragon Dictation.  Please note that portions of this note were completed with a voice recognition program.  Part of this note may be an electronic transcription/translation of spoken language to printed text using the Dragon Dictation System.      This document has been electronically signed by Aidan Lundberg DPM on March 10, 2025 11:31 EDT

## 2025-04-02 ENCOUNTER — OFFICE VISIT (OUTPATIENT)
Dept: CARDIOLOGY | Facility: CLINIC | Age: OVER 89
End: 2025-04-02
Payer: MEDICARE

## 2025-04-02 VITALS
HEIGHT: 62 IN | WEIGHT: 144.2 LBS | BODY MASS INDEX: 26.54 KG/M2 | SYSTOLIC BLOOD PRESSURE: 192 MMHG | HEART RATE: 57 BPM | DIASTOLIC BLOOD PRESSURE: 92 MMHG

## 2025-04-02 DIAGNOSIS — I50.32 CHRONIC HEART FAILURE WITH PRESERVED EJECTION FRACTION (HFPEF): ICD-10-CM

## 2025-04-02 DIAGNOSIS — Z98.61 CAD S/P PERCUTANEOUS CORONARY ANGIOPLASTY: Primary | ICD-10-CM

## 2025-04-02 DIAGNOSIS — E78.5 HYPERLIPIDEMIA LDL GOAL <70: ICD-10-CM

## 2025-04-02 DIAGNOSIS — I10 ESSENTIAL HYPERTENSION: ICD-10-CM

## 2025-04-02 DIAGNOSIS — I25.10 CAD S/P PERCUTANEOUS CORONARY ANGIOPLASTY: Primary | ICD-10-CM

## 2025-04-02 RX ORDER — MIRTAZAPINE 15 MG/1
15 TABLET, FILM COATED ORAL
COMMUNITY
Start: 2025-03-07

## 2025-04-02 RX ORDER — ASPIRIN 81 MG/1
81 TABLET ORAL DAILY
COMMUNITY

## 2025-04-02 RX ORDER — TRIAMCINOLONE ACETONIDE 0.25 MG/G
1 CREAM TOPICAL DAILY
COMMUNITY
Start: 2025-02-28

## 2025-04-02 NOTE — PROGRESS NOTES
Chief Complaint  Follow-up, Hyperlipidemia, Coronary Artery Disease, and Hypertension    Subjective            History of Present Illness  Megha Hardy is a 89-year-old female patient who presents to the office today for follow-up.    History of Present Illness  The patient presents for follow-up of coronary artery disease, heart failure, blood pressure and cholesterol management.    She has a history of coronary artery disease, for which she underwent stenting procedures in the past. She reports no new or worsening cardiac symptoms since her last visit. She was previously on a daily regimen of aspirin 81 mg but discontinued its use approximately 2 years ago due to severe nosebleeds.    She monitors her blood pressure at home every morning, although she did not do so today due to her appointment. Last night, her blood pressure reading was 109/63, which is consistent with her typical range. She has been receiving care from a home health nurse for the past 2 months, who has reported that she is doing well. She is currently on metoprolol 25 mg daily for blood pressure and heart rate regulation, which she tolerates well.    She is also on atorvastatin 40 mg daily for cholesterol management, which she tolerates well.    She has heart failure and is on Lasix 40 mg daily to prevent fluid retention, which she tolerates well. She has noticed a significant reduction in the swelling of her ankles, although some residual swelling remains.    MEDICATIONS  Current: Atorvastatin 40 mg daily, Lasix 40 mg daily, metoprolol 25 mg daily.  Discontinued: Aspirin 81 mg daily.        [facility]  Past Medical History:   Diagnosis Date    Acute bacterial sialadenitis 05/09/2023    Arthritis     Broken shoulder 1960    Bronchitis     Bunion     Cancer, skin, squamous cell     Cerumen impaction     CHF (congestive heart failure)     Condition not found     Ulcer    Corns and callus     Hallux valgus of left foot 03/15/2018    Hallux valgus of  right foot 03/15/2018    Hammer toe     Head injury 1960    head injury from falling off back of truck    Hearing loss     History of kidney problems     History of total knee arthroplasty, left 05/07/2018 11/03/2018    Hyperlipidemia     Hypertension     Ingrowing toenail     Kidney disease     Numbness in feet     Obesity     Primary osteoarthritis of left knee 09/11/2017    Sinus congestion     Tinea unguium          ALLERGY  Allergies   Allergen Reactions    Sulfa Antibiotics Rash          SURGICALHX  Past Surgical History:   Procedure Laterality Date    CORONARY ANGIOPLASTY WITH STENT PLACEMENT  2006    OTHER SURGICAL HISTORY      Artificial Joints/Limbs    SHOULDER SURGERY Left 1963    SKIN CANCER EXCISION      TOTAL KNEE ARTHROPLASTY Left 1983    TOTAL KNEE ARTHROPLASTY Right 2004          SOC  Social History     Socioeconomic History    Marital status:    Tobacco Use    Smoking status: Never     Passive exposure: Past    Smokeless tobacco: Never   Vaping Use    Vaping status: Never Used   Substance and Sexual Activity    Alcohol use: Never    Drug use: Never    Sexual activity: Defer         FAMHX  Family History   Problem Relation Age of Onset    Arthritis Mother     Stroke Mother     Heart disease Mother     Diabetes Mother         Mellitus    Arthritis Father     Heart disease Father     Seizures Sister     Colon cancer Brother 50          MEDSIGONLY  Current Outpatient Medications on File Prior to Visit   Medication Sig    aspirin 81 MG EC tablet Take 1 tablet by mouth Daily.    atorvastatin (LIPITOR) 40 MG tablet     Calcium Carbonate-Vitamin D (Calcium-Vitamin D) 600-125 MG-UNIT tablet Calcium 600 + D(3) 600-125 mg-unit oral tablet take 1 tablet by oral route daily   Active    Diclofenac Sodium (VOLTAREN) 1 % gel gel Apply 4 g topically to the appropriate area as directed 4 (Four) Times a Day As Needed (pain and swelling).    donepezil (Aricept) 10 MG tablet Take 1 tablet by mouth Every Morning.  "   famotidine (PEPCID) 40 MG tablet Take 1 tablet by mouth every night at bedtime.    furosemide (LASIX) 40 MG tablet 1qd    metoprolol succinate XL (TOPROL-XL) 25 MG 24 hr tablet     mirtazapine (REMERON) 15 MG tablet Take 1 tablet by mouth every night at bedtime.    Multiple Vitamins-Minerals (PRESERVISION AREDS 2 PO) TAKE ONE TABLET TWICE DAILY for 30    Multivitamin tablet tablet Take 1 tablet by mouth Daily.    potassium chloride (K-DUR,KLOR-CON) 10 MEQ CR tablet 2 (Two) Times a Day.    triamcinolone (KENALOG) 0.025 % cream Apply 1 Application topically to the appropriate area as directed Daily.    vitamin d 125 MCG (5000 UT) capsule 1 capsule Daily.     No current facility-administered medications on file prior to visit.         Objective   BP (!) 192/92   Pulse 57   Ht 157.5 cm (62.01\")   Wt 65.4 kg (144 lb 3.2 oz)   BMI 26.37 kg/m²       Physical Exam  HENT:      Head: Normocephalic.   Neck:      Vascular: No carotid bruit.   Cardiovascular:      Rate and Rhythm: Normal rate and regular rhythm.      Pulses: Normal pulses.      Heart sounds: Normal heart sounds. No murmur heard.  Pulmonary:      Effort: Pulmonary effort is normal.      Breath sounds: Normal breath sounds.   Musculoskeletal:      Cervical back: Neck supple.      Right lower leg: No edema.      Left lower leg: No edema.   Skin:     General: Skin is dry.   Neurological:      Mental Status: She is alert. Mental status is at baseline.   Psychiatric:         Behavior: Behavior normal.       Result Review :   The following data was reviewed by: MELVIN Cedeno on 04/02/2025:  No results found for: \"PROBNP\"  CMP          4/15/2024    09:57 8/30/2024    13:18 2/26/2025    14:00   CMP   Glucose 98  112  114    BUN 14  23  12    Creatinine 0.88  0.91  0.81    EGFR 61.4  58.6  67.4    Sodium 144  141  140    Potassium 4.5  3.7  4.5    Chloride 108  104  104    Calcium 9.9  9.8  9.8    Total Protein 7.2  7.0  7.0    Albumin 4.2  3.7  3.8  " "  Globulin 3.0  3.3  3.2    Total Bilirubin 0.6  0.5  0.4    Alkaline Phosphatase 79  80  77    AST (SGOT) 37  29  37    ALT (SGPT) 23  15  17    Albumin/Globulin Ratio 1.4  1.1  1.2    BUN/Creatinine Ratio 15.9  25.3  14.8    Anion Gap 7.7  9.4  8.1      CBC w/diff          8/30/2024    13:18 2/26/2025    14:00   CBC w/Diff   WBC 6.57  5.60    RBC 3.63  3.88    Hemoglobin 11.0  11.8    Hematocrit 33.5  36.6    MCV 92.3  94.3    MCH 30.3  30.4    MCHC 32.8  32.2    RDW 13.0  12.9    Platelets 224  190    Neutrophil Rel % 59.1  36.5    Immature Granulocyte Rel % 0.2  0.2    Lymphocyte Rel % 30.6  51.8    Monocyte Rel % 9.3  9.5    Eosinophil Rel % 0.3  1.3    Basophil Rel % 0.5  0.7       Lab Results   Component Value Date    TSH 2.960 04/15/2024      Lab Results   Component Value Date    FREET4 0.99 04/15/2024      No results found for: \"DDIMERQUANT\"  Magnesium   Date Value Ref Range Status   08/30/2024 2.1 1.7 - 2.3 mg/dL Final      No results found for: \"DIGOXIN\"   Lab Results   Component Value Date    TROPONINT 33 (H) 08/30/2024                Assessment and Plan    Diagnoses and all orders for this visit:    1. CAD S/P percutaneous coronary angioplasty (Primary)    2. Chronic heart failure with preserved ejection fraction (HFpEF)    3. Essential hypertension    4. Hyperlipidemia LDL goal <70      Assessment & Plan  1. Coronary Artery Disease.  She has a history of coronary artery disease with previous stenting. No new or worsening symptoms have been reported since the last visit. She is advised to continue her current medication regimen, including atorvastatin 40 mg daily, Lasix 40 mg daily, and metoprolol 25 mg daily. If any new or worsening symptoms develop before the next scheduled visit, she should contact the office immediately.    2. Heart Failure.  She reports no new or worsening symptoms related to heart failure. Her ankles show reduced swelling, indicating good fluid management with Lasix 40 mg daily. " She is advised to continue her current medication regimen. If any new or worsening symptoms develop, she should contact the office.    3. Blood Pressure Management.  Her blood pressure was elevated during today's visit, likely due to stress from nephew passing away yesterday. Home readings have been within the normal range. She is advised to continue taking metoprolol 25 mg daily and monitor her blood pressure regularly at home. If her blood pressure remains elevated, adjustments to her medication may be considered.    4. Cholesterol Management.  She is currently taking atorvastatin 40 mg daily for cholesterol management. A lipid panel will be added to her next set of labs to monitor cholesterol levels. She is advised to continue her current medication regimen.    Follow-up  The patient will follow up in 6 months.    PROCEDURE  The patient underwent stenting procedures in the past for coronary artery disease.      Follow Up   Return in about 6 months (around 10/2/2025) for Follow up with Dr Goff.    Patient was given instructions and counseling regarding her condition or for health maintenance advice. Please see specific information pulled into the AVS if appropriate.     Megha Hardy  reports that she has never smoked. She has been exposed to tobacco smoke. She has never used smokeless tobacco.         Patient or patient representative verbalized consent for the use of Ambient Listening during the visit with  MELVIN Cedeno for chart documentation. 4/2/2025  10:05 EDT    MELVIN Cedeno  04/02/25  09:49 EDT    Dictated Utilizing Dragon Dictation

## 2025-06-03 ENCOUNTER — OFFICE VISIT (OUTPATIENT)
Dept: PODIATRY | Facility: CLINIC | Age: OVER 89
End: 2025-06-03
Payer: MEDICARE

## 2025-06-03 VITALS
DIASTOLIC BLOOD PRESSURE: 58 MMHG | TEMPERATURE: 98 F | HEART RATE: 80 BPM | OXYGEN SATURATION: 92 % | SYSTOLIC BLOOD PRESSURE: 156 MMHG | WEIGHT: 144 LBS | BODY MASS INDEX: 26.33 KG/M2

## 2025-06-03 DIAGNOSIS — M20.12 VALGUS DEFORMITY OF BOTH GREAT TOES: ICD-10-CM

## 2025-06-03 DIAGNOSIS — M79.671 FOOT PAIN, BILATERAL: ICD-10-CM

## 2025-06-03 DIAGNOSIS — M79.672 FOOT PAIN, BILATERAL: ICD-10-CM

## 2025-06-03 DIAGNOSIS — L60.0 ONYCHOCRYPTOSIS: ICD-10-CM

## 2025-06-03 DIAGNOSIS — M20.11 VALGUS DEFORMITY OF BOTH GREAT TOES: ICD-10-CM

## 2025-06-03 DIAGNOSIS — M20.42 HAMMER TOES OF BOTH FEET: ICD-10-CM

## 2025-06-03 DIAGNOSIS — M20.10 VALGUS DEFORMITY OF GREAT TOE, UNSPECIFIED LATERALITY: Primary | ICD-10-CM

## 2025-06-03 DIAGNOSIS — M20.41 HAMMER TOES OF BOTH FEET: ICD-10-CM

## 2025-06-03 DIAGNOSIS — B35.1 ONYCHOMYCOSIS: ICD-10-CM

## 2025-06-03 DIAGNOSIS — R26.2 DIFFICULTY WALKING: ICD-10-CM

## 2025-06-03 PROCEDURE — 1160F RVW MEDS BY RX/DR IN RCRD: CPT | Performed by: PODIATRIST

## 2025-06-03 PROCEDURE — 11721 DEBRIDE NAIL 6 OR MORE: CPT | Performed by: PODIATRIST

## 2025-06-03 PROCEDURE — 1159F MED LIST DOCD IN RCRD: CPT | Performed by: PODIATRIST

## 2025-06-03 NOTE — PROGRESS NOTES
[facility] - PODIATRY    Today's Date: 06/03/25    Patient Name: Megha Hardy  MRN: [id number]  CSN: [id number]  PCP: Luz Marina Guerrero APRN, Last PCP Visit: 7 March 2025  Referring Provider: No ref. provider found    SUBJECTIVE     Chief Complaint   Patient presents with    Left Foot - Follow-up, Nail Problem    Right Foot - Follow-up, Nail Problem     HPI: Megha Hardy, a 89 y.o.female, comes to clinic.    New, Established, New Problem:  established   Location:  Toenails  Duration:   Greater than five years  Onset:  Gradual  Nature:  sore with palpation.  Stable, worsening, improving:   Stable  Aggravating factors:  Pain with shoe gear and ambulation.  Previous Treatment:  debridement    Medical changes: No changes    Patient denies any fevers, chills, nausea, vomiting, shortness of breathe, nor any other constitutional signs nor symptoms.       Past Medical History:   Diagnosis Date    Acute bacterial sialadenitis 05/09/2023    Arthritis     Broken shoulder 1960    Bronchitis     Bunion     Cancer, skin, squamous cell     Cerumen impaction     CHF (congestive heart failure)     Condition not found     Ulcer    Corns and callus     Hallux valgus of left foot 03/15/2018    Hallux valgus of right foot 03/15/2018    Hammer toe     Head injury 1960    head injury from falling off back of truck    Hearing loss     History of kidney problems     History of total knee arthroplasty, left 05/07/2018 11/03/2018    Hyperlipidemia     Hypertension     Ingrowing toenail     Kidney disease     Numbness in feet     Obesity     Primary osteoarthritis of left knee 09/11/2017    Sinus congestion     Tinea unguium      Past Surgical History:   Procedure Laterality Date    CORONARY ANGIOPLASTY WITH STENT PLACEMENT  2006    OTHER SURGICAL HISTORY      Artificial Joints/Limbs    SHOULDER SURGERY Left 1963    SKIN CANCER EXCISION      TOTAL KNEE ARTHROPLASTY Left 1983    TOTAL KNEE ARTHROPLASTY Right 2004      Family History   Problem Relation Age of Onset    Arthritis Mother     Stroke Mother     Heart disease Mother     Diabetes Mother         Mellitus    Arthritis Father     Heart disease Father     Seizures Sister     Colon cancer Brother 50     Social History     Socioeconomic History    Marital status:    Tobacco Use    Smoking status: Never     Passive exposure: Past    Smokeless tobacco: Never   Vaping Use    Vaping status: Never Used   Substance and Sexual Activity    Alcohol use: Never    Drug use: Never    Sexual activity: Defer     Allergies   Allergen Reactions    Sulfa Antibiotics Rash     Current Outpatient Medications   Medication Sig Dispense Refill    atorvastatin (LIPITOR) 40 MG tablet       Calcium Carbonate-Vitamin D (Calcium-Vitamin D) 600-125 MG-UNIT tablet Calcium 600 + D(3) 600-125 mg-unit oral tablet take 1 tablet by oral route daily   Active      Diclofenac Sodium (VOLTAREN) 1 % gel gel Apply 4 g topically to the appropriate area as directed 4 (Four) Times a Day As Needed (pain and swelling). 150 g 0    donepezil (Aricept) 10 MG tablet Take 1 tablet by mouth Every Morning. 90 tablet 3    famotidine (PEPCID) 40 MG tablet Take 1 tablet by mouth every night at bedtime.      furosemide (LASIX) 40 MG tablet 1qd      metoprolol succinate XL (TOPROL-XL) 25 MG 24 hr tablet       mirtazapine (REMERON) 15 MG tablet Take 1 tablet by mouth every night at bedtime.      Multiple Vitamins-Minerals (PRESERVISION AREDS 2 PO) TAKE ONE TABLET TWICE DAILY for 30      Multivitamin tablet tablet Take 1 tablet by mouth Daily.      potassium chloride (K-DUR,KLOR-CON) 10 MEQ CR tablet 2 (Two) Times a Day.      triamcinolone (KENALOG) 0.025 % cream Apply 1 Application topically to the appropriate area as directed Daily.      vitamin d 125 MCG (5000 UT) capsule 1 capsule Daily.      aspirin 81 MG EC tablet Take 1 tablet by mouth Daily. (Patient not taking: No sig reported)       No current facility-administered  medications for this visit.     Review of Systems   Constitutional: Negative.    Skin:         Painful toenails.   All other systems reviewed and are negative.      OBJECTIVE     Vitals:    25 1131   BP: 156/58   Pulse: 80   Temp: 98 °F (36.7 °C)   SpO2: 92%         Patient seen in no apparent distress.      PHYSICAL EXAM:     Foot/Ankle Exam    GENERAL  Appearance:  elderly and chronically ill  Orientation:  AAOx3  Affect:  appropriate  Gait:  antalgic  Assistance:  cane use  Right shoe gear: casual shoe  Left shoe gear: casual shoe    VASCULAR     Right Foot Vascularity   Dorsalis pedis:  2+  Posterior tibial:  2+  Skin temperature:  warm  Edema gradin+ and pitting  CFT:  < 3 seconds  Pedal hair growth:  Absent  Varicosities:  severe varicosities     Left Foot Vascularity   Dorsalis pedis:  2+  Posterior tibial:  2+  Skin temperature:  warm  Edema grading:  Pitting and 2+  CFT:  < 3 seconds  Pedal hair growth:  Absent  Varicosities:  severe varicosities     NEUROLOGIC     Right Foot Neurologic   Normal sensation    Light touch sensation: normal  Vibratory sensation: normal  Hot/Cold sensation: normal  Protective Sensation using Bozman-Maribell Monofilament:   Sites intact: 10  Sites tested: 10     Left Foot Neurologic   Normal sensation    Light touch sensation: normal  Vibratory sensation: normal  Hot/Cold sensation:  normal  Protective Sensation using Bozman-Maribell Monofilament:   Sites intact: 10  Sites tested: 10    MUSCULOSKELETAL     Right Foot Musculoskeletal   Hammertoe:  Second toe and third toe  Hallux valgus: Yes       Left Foot Musculoskeletal   Hammertoe:  Second toe and third toe  Hallux valgus: Yes      MUSCLE STRENGTH     Right Foot Muscle Strength   Foot dorsiflexion:  4-  Foot plantar flexion:  4-  Foot inversion:  4-  Foot eversion:  4-     Left Foot Muscle Strength   Foot dorsiflexion:  4-  Foot plantar flexion:  4-  Foot inversion:  4-  Foot eversion:  4-    RANGE OF MOTION      Right Foot Range of Motion   Foot and ankle ROM within normal limits       Left Foot Range of Motion   Foot and ankle ROM within normal limits      DERMATOLOGIC      Right Foot Dermatologic   Skin  Right foot skin is intact.   Nails  1.  Positive for elongated, onychomycosis, abnormal thickness, subungual debris and ingrown toenail.  2.  Positive for elongated, onychomycosis, abnormal thickness, subungual debris and ingrown toenail.  3.  Positive for elongated, onychomycosis, abnormal thickness, subungual debris and ingrown toenail.  4.  Positive for elongated, onychomycosis, abnormal thickness, subungual debris and ingrown toenail.  5.  Positive for elongated, onychomycosis, abnormal thickness, subungual debris and ingrown toenail.     Left Foot Dermatologic   Skin  Left foot skin is intact.   Nails  1.  Positive for elongated, onychomycosis, abnormal thickness, subungual debris and ingrown toenail.  2.  Positive for elongated, onychomycosis, abnormal thickness, subungual debris and ingrown toenail.  3.  Positive for elongated, onychomycosis, abnormal thickness, subungual debris and ingrown toenail.  4.  Positive for elongated, onychomycosis, abnormally thick, subungual debris and ingrown toenail.  5.  Positive for elongated, onychomycosis, abnormally thick, subungual debris and ingrown toenail.    I have reexamined the patient the results are consistent with the previously documented exam.    ASSESSMENT/PLAN     Diagnoses and all orders for this visit:    1. Valgus deformity of great toe, unspecified laterality (Primary)    2. Foot pain, bilateral    3. Hammer toes of both feet    4. Onychomycosis    5. Onychocryptosis    6. Difficulty walking    7. Valgus deformity of both great toes      Comprehensive lower extremity examination and evaluation was performed.    Discussed findings and treatment plan including risks, benefits, and treatment options with patient in detail. Patient agreed with treatment  plan.    Toenails:  1 through 5 bilaterally were debrided in thickness and length and then smoothed with a Dremel Tool.  Tolerated the procedure well without complications.    An After Visit Summary was printed and given to the patient at discharge, including (if requested) any available informative/educational handouts regarding diagnosis, treatment, or medications. All questions were answered to patient/family satisfaction. Should symptoms fail to improve or worsen they agree to call or return to clinic or to go to the Emergency Department. Discussed the importance of following up with any needed screening tests/labs/specialist appointments and any requested follow-up recommended by me today. Importance of maintaining follow-up discussed and patient accepts that missed appointments can delay diagnosis and potentially lead to worsening of conditions.    Return in about 9 weeks (around 8/5/2025) for Toenail Care., or sooner if acute issues arise.  I have reviewed the assessment and plan and verified the accuracy of it. No changes to assessment and plan since the information was documented. Aidan Lundberg DPM 06/03/25     I have dictated this note utilizing Dragon Dictation.  Please note that portions of this note were completed with a voice recognition program.  Part of this note may be an electronic transcription/translation of spoken language to printed text using the Dragon Dictation System.      This document has been electronically signed by Aidan Lundberg DPM on Shruthi 3, 2025 11:35 EDT

## 2025-06-27 DIAGNOSIS — F03.90 DEMENTIA WITHOUT BEHAVIORAL DISTURBANCE: Primary | ICD-10-CM

## 2025-06-27 RX ORDER — DONEPEZIL HYDROCHLORIDE 10 MG/1
TABLET, FILM COATED ORAL
Qty: 30 TABLET | Refills: 0 | Status: SHIPPED | OUTPATIENT
Start: 2025-06-27

## 2025-07-25 DIAGNOSIS — F03.90 DEMENTIA WITHOUT BEHAVIORAL DISTURBANCE: ICD-10-CM

## 2025-07-25 RX ORDER — DONEPEZIL HYDROCHLORIDE 10 MG/1
TABLET, FILM COATED ORAL
Qty: 30 TABLET | Refills: 0 | Status: SHIPPED | OUTPATIENT
Start: 2025-07-25

## 2025-08-21 DIAGNOSIS — F03.90 DEMENTIA WITHOUT BEHAVIORAL DISTURBANCE: ICD-10-CM

## 2025-08-21 RX ORDER — DONEPEZIL HYDROCHLORIDE 10 MG/1
TABLET, FILM COATED ORAL
Qty: 30 TABLET | Refills: 0 | OUTPATIENT
Start: 2025-08-21

## 2025-08-28 ENCOUNTER — TELEPHONE (OUTPATIENT)
Dept: NEUROLOGY | Facility: CLINIC | Age: OVER 89
End: 2025-08-28
Payer: MEDICARE